# Patient Record
Sex: FEMALE | Race: WHITE | NOT HISPANIC OR LATINO | Employment: OTHER | URBAN - METROPOLITAN AREA
[De-identification: names, ages, dates, MRNs, and addresses within clinical notes are randomized per-mention and may not be internally consistent; named-entity substitution may affect disease eponyms.]

---

## 2022-04-15 ENCOUNTER — APPOINTMENT (OUTPATIENT)
Dept: ADMISSIONS | Facility: MEDICAL CENTER | Age: 67
End: 2022-04-15
Attending: INTERNAL MEDICINE
Payer: MEDICARE

## 2022-04-15 RX ORDER — LOSARTAN POTASSIUM AND HYDROCHLOROTHIAZIDE 12.5; 1 MG/1; MG/1
1 TABLET ORAL DAILY
COMMUNITY

## 2022-04-15 RX ORDER — FENOFIBRATE 145 MG/1
145 TABLET, COATED ORAL DAILY
COMMUNITY

## 2022-04-15 RX ORDER — BACLOFEN 10 MG/1
10 TABLET ORAL 4 TIMES DAILY
COMMUNITY

## 2022-04-15 RX ORDER — BUPROPION HYDROCHLORIDE 200 MG/1
200 TABLET, EXTENDED RELEASE ORAL DAILY
COMMUNITY

## 2022-04-15 RX ORDER — LEVOTHYROXINE SODIUM 175 UG/1
175 TABLET ORAL
COMMUNITY

## 2022-04-15 RX ORDER — ZONISAMIDE 100 MG/1
100 CAPSULE ORAL DAILY
COMMUNITY

## 2022-04-15 RX ORDER — GABAPENTIN 100 MG/1
100 CAPSULE ORAL 2 TIMES DAILY
COMMUNITY

## 2022-04-19 ENCOUNTER — HOSPITAL ENCOUNTER (OUTPATIENT)
Facility: MEDICAL CENTER | Age: 67
End: 2022-04-19
Attending: INTERNAL MEDICINE | Admitting: INTERNAL MEDICINE
Payer: MEDICARE

## 2022-04-19 VITALS
TEMPERATURE: 97.1 F | SYSTOLIC BLOOD PRESSURE: 128 MMHG | DIASTOLIC BLOOD PRESSURE: 53 MMHG | OXYGEN SATURATION: 91 % | HEIGHT: 65 IN | RESPIRATION RATE: 20 BRPM | BODY MASS INDEX: 40.04 KG/M2 | WEIGHT: 240.3 LBS | HEART RATE: 73 BPM

## 2022-04-19 DIAGNOSIS — D61.818 PANCYTOPENIA (HCC): ICD-10-CM

## 2022-04-19 LAB
ANISOCYTOSIS BLD QL SMEAR: ABNORMAL
BASOPHILS # BLD AUTO: 1.8 % (ref 0–1.8)
BASOPHILS # BLD: 0.06 K/UL (ref 0–0.12)
DACRYOCYTES BLD QL SMEAR: NORMAL
EOSINOPHIL # BLD AUTO: 0 K/UL (ref 0–0.51)
EOSINOPHIL NFR BLD: 0 % (ref 0–6.9)
ERYTHROCYTE [DISTWIDTH] IN BLOOD BY AUTOMATED COUNT: 73.7 FL (ref 35.9–50)
HCT VFR BLD AUTO: 27 % (ref 37–47)
HGB BLD-MCNC: 8.6 G/DL (ref 12–16)
HGB RETIC QN AUTO: 40.2 PG/CELL (ref 29–35)
IMM RETICS NFR: 37.4 % (ref 9.3–17.4)
LYMPHOCYTES # BLD AUTO: 1.37 K/UL (ref 1–4.8)
LYMPHOCYTES NFR BLD: 44.2 % (ref 22–41)
MACROCYTES BLD QL SMEAR: ABNORMAL
MANUAL DIFF BLD: NORMAL
MCH RBC QN AUTO: 36.4 PG (ref 27–33)
MCHC RBC AUTO-ENTMCNC: 31.9 G/DL (ref 33.6–35)
MCV RBC AUTO: 114.4 FL (ref 81.4–97.8)
MONOCYTES # BLD AUTO: 0.03 K/UL (ref 0–0.85)
MONOCYTES NFR BLD AUTO: 0.9 % (ref 0–13.4)
MORPHOLOGY BLD-IMP: NORMAL
MYELOCYTES NFR BLD MANUAL: 0.9 %
NEUTROPHILS # BLD AUTO: 1.62 K/UL (ref 2–7.15)
NEUTROPHILS NFR BLD: 52.2 % (ref 44–72)
NRBC # BLD AUTO: 0.12 K/UL
NRBC BLD-RTO: 3.8 /100 WBC
OVALOCYTES BLD QL SMEAR: NORMAL
PATHOLOGY CONSULT NOTE: NORMAL
PLATELET # BLD AUTO: 91 K/UL (ref 164–446)
PLATELET BLD QL SMEAR: NORMAL
PMV BLD AUTO: 12.2 FL (ref 9–12.9)
POIKILOCYTOSIS BLD QL SMEAR: NORMAL
POLYCHROMASIA BLD QL SMEAR: NORMAL
RBC # BLD AUTO: 2.36 M/UL (ref 4.2–5.4)
RBC BLD AUTO: PRESENT
RETICS # AUTO: 0.23 M/UL (ref 0.04–0.06)
RETICS/RBC NFR: 9.7 % (ref 0.8–2.1)
WBC # BLD AUTO: 3.1 K/UL (ref 4.8–10.8)

## 2022-04-19 PROCEDURE — 88313 SPECIAL STAINS GROUP 2: CPT

## 2022-04-19 PROCEDURE — 88185 FLOWCYTOMETRY/TC ADD-ON: CPT | Mod: 91

## 2022-04-19 PROCEDURE — 160025 RECOVERY II MINUTES (STATS): Performed by: HOSPITALIST

## 2022-04-19 PROCEDURE — 99152 MOD SED SAME PHYS/QHP 5/>YRS: CPT | Performed by: HOSPITALIST

## 2022-04-19 PROCEDURE — 85046 RETICYTE/HGB CONCENTRATE: CPT

## 2022-04-19 PROCEDURE — 160046 HCHG PACU - 1ST 60 MINS PHASE II: Performed by: HOSPITALIST

## 2022-04-19 PROCEDURE — 160048 HCHG OR STATISTICAL LEVEL 1-5: Performed by: HOSPITALIST

## 2022-04-19 PROCEDURE — 88311 DECALCIFY TISSUE: CPT

## 2022-04-19 PROCEDURE — 88360 TUMOR IMMUNOHISTOCHEM/MANUAL: CPT

## 2022-04-19 PROCEDURE — 88184 FLOWCYTOMETRY/ TC 1 MARKER: CPT

## 2022-04-19 PROCEDURE — 700111 HCHG RX REV CODE 636 W/ 250 OVERRIDE (IP): Performed by: HOSPITALIST

## 2022-04-19 PROCEDURE — 85007 BL SMEAR W/DIFF WBC COUNT: CPT

## 2022-04-19 PROCEDURE — 88305 TISSUE EXAM BY PATHOLOGIST: CPT | Mod: 59

## 2022-04-19 PROCEDURE — 38222 DX BONE MARROW BX & ASPIR: CPT | Performed by: HOSPITALIST

## 2022-04-19 PROCEDURE — 700105 HCHG RX REV CODE 258: Performed by: HOSPITALIST

## 2022-04-19 PROCEDURE — 160038 HCHG SURGERY MINUTES - EA ADDL 1 MIN LEVEL 2: Performed by: HOSPITALIST

## 2022-04-19 PROCEDURE — 88374 M/PHMTRC ALYS ISHQUANT/SEMIQ: CPT | Mod: 91

## 2022-04-19 PROCEDURE — 85025 COMPLETE CBC W/AUTO DIFF WBC: CPT

## 2022-04-19 PROCEDURE — 160027 HCHG SURGERY MINUTES - 1ST 30 MINS LEVEL 2: Performed by: HOSPITALIST

## 2022-04-19 PROCEDURE — 88264 CHROMOSOME ANALYSIS 20-25: CPT

## 2022-04-19 PROCEDURE — 88237 TISSUE CULTURE BONE MARROW: CPT

## 2022-04-19 RX ORDER — SODIUM CHLORIDE, SODIUM LACTATE, POTASSIUM CHLORIDE, CALCIUM CHLORIDE 600; 310; 30; 20 MG/100ML; MG/100ML; MG/100ML; MG/100ML
INJECTION, SOLUTION INTRAVENOUS CONTINUOUS
Status: DISCONTINUED | OUTPATIENT
Start: 2022-04-19 | End: 2022-04-19 | Stop reason: HOSPADM

## 2022-04-19 RX ORDER — ONDANSETRON 2 MG/ML
4 INJECTION INTRAMUSCULAR; INTRAVENOUS EVERY 4 HOURS PRN
Status: DISCONTINUED | OUTPATIENT
Start: 2022-04-19 | End: 2022-04-19 | Stop reason: HOSPADM

## 2022-04-19 RX ORDER — MIDAZOLAM HYDROCHLORIDE 1 MG/ML
INJECTION INTRAMUSCULAR; INTRAVENOUS
Status: DISCONTINUED
Start: 2022-04-19 | End: 2022-04-19 | Stop reason: HOSPADM

## 2022-04-19 RX ORDER — MIDAZOLAM HYDROCHLORIDE 1 MG/ML
.5-2 INJECTION INTRAMUSCULAR; INTRAVENOUS PRN
Status: DISCONTINUED | OUTPATIENT
Start: 2022-04-19 | End: 2022-04-19 | Stop reason: HOSPADM

## 2022-04-19 RX ORDER — SODIUM CHLORIDE 9 MG/ML
500 INJECTION, SOLUTION INTRAVENOUS
Status: DISCONTINUED | OUTPATIENT
Start: 2022-04-19 | End: 2022-04-19 | Stop reason: HOSPADM

## 2022-04-19 RX ADMIN — ONDANSETRON 4 MG: 2 INJECTION INTRAMUSCULAR; INTRAVENOUS at 10:21

## 2022-04-19 RX ADMIN — SODIUM CHLORIDE, POTASSIUM CHLORIDE, SODIUM LACTATE AND CALCIUM CHLORIDE: 600; 310; 30; 20 INJECTION, SOLUTION INTRAVENOUS at 09:43

## 2022-04-19 ASSESSMENT — PAIN DESCRIPTION - PAIN TYPE
TYPE: SURGICAL PAIN
TYPE: SURGICAL PAIN
TYPE: ACUTE PAIN
TYPE: SURGICAL PAIN
TYPE: ACUTE PAIN
TYPE: SURGICAL PAIN

## 2022-04-19 NOTE — OR NURSING
1056: Pt arrived from procedure room to bay #3, meets phase 2 critieria. ID verified. Report received from JOSH Marie. Connected to monitor. 2L O2 via NC.  Bandaid left ischium noted clean dry and intact. Pt awake and alert, denies any pain.    1100: weaned off O2 for room air trial    1107: friend Tracy given telephone updates, ETA 10 min. Discharge instructions given.    1117: PIV removed with tip intact. Pt discharged home in stable condition. Down to pick-up area via wheelchair accompanied by GUNJAN Gayle. All belongings taken.

## 2022-04-19 NOTE — PROCEDURES
Bone Marrow Biopsy/Aspiration    Date/Time: 4/19/2022 10:57 AM  Performed by: Luis A Snider M.D.  Authorized by: Luis A Snider M.D.     Consent:     Consent obtained:  Verbal    Consent given by:  Patient    Risks discussed:  Bleeding, infection, pain and repeat procedure    Alternatives discussed:  No treatment, delayed treatment and alternative treatment  Universal protocol:     Procedure explained and questions answered to patient or proxy's satisfaction: yes      Relevant documents present and verified: yes      Test results available and properly labeled: yes      Required blood products, implants, devices, and special equipment available: yes      Site/side marked: yes      Patient identity confirmed:  Verbally with patient, arm band and provided demographic data  Pre-procedure details:     Procedure type:  Aspiration and biopsy    Requesting physician:  Kamila    Indications:  Pancytopenia    Position:  Prone    Buttock laterality:  Left    Local anesthetic:  1% Lidocaine    Subcutaneous volume:  1 mL    Periosteum anesthetic volume:  4 mL    Preparation: Patient was prepped and draped in usual sterile fashion    Sedation:     Patient Sedated: Yes      Sedation type: moderate (conscious) sedation      Sedation:  Midazolam    Analgesia:  Fentanyl  Procedure details:     Aspirate obtained:  5 mL followed by 5 mL    Biopsy performed:  1 core    Number of attempts:  1  Post-procedure:     Puncture site:  Adhesive bandage applied    Patient tolerance of procedure:  Tolerated well, no immediate complications  Comments:      100mcg of fentanyl and 3mg of versed used for sedation and analgesia

## 2022-04-19 NOTE — DISCHARGE INSTRUCTIONS
BONE MARROW ASPIRATION & BIOPSY DISCHARGE INSTRUCTIONS     1. After the numbing medicine wears off, you may feel some discomfort.    2. Keep bandage clean and dry for 24 hours.  After this time, you can change the bandage.  You may now bathe or shower.    3. If bleeding occurs after your bone marrow aspiration or biopsy, apply pressure to the area and call your doctor immediately.    4. Call your doctor if pain persists for greater than 24 hours in the area where you had your aspiration or biopsy.    5. Call your doctor immediately if you notice redness or drainage in the area or if you have a fever.    6. Call your doctor if you have numbness or weakness in the area where the doctor took the bone marrow or down your leg.    7. Do not drive or drink alcohol for 24 hours if you have had sedation medication.  The medication will make you drowsy.    8. Resume your regular diet.    9. Follow up with Dr Treviño, call [phone] to schedule.

## 2022-05-17 DIAGNOSIS — C92.00 ACUTE MYELOID LEUKEMIA NOT HAVING ACHIEVED REMISSION (HCC): ICD-10-CM

## 2022-05-25 ENCOUNTER — HOSPITAL ENCOUNTER (INPATIENT)
Facility: MEDICAL CENTER | Age: 67
LOS: 4 days | DRG: 837 | End: 2022-05-29
Admitting: INTERNAL MEDICINE
Payer: MEDICARE

## 2022-05-25 DIAGNOSIS — C92.00 ACUTE MYELOID LEUKEMIA NOT HAVING ACHIEVED REMISSION (HCC): ICD-10-CM

## 2022-05-25 DIAGNOSIS — G44.011 INTRACTABLE EPISODIC CLUSTER HEADACHE: ICD-10-CM

## 2022-05-25 DIAGNOSIS — Z79.4 TYPE 2 DIABETES MELLITUS WITH HYPERGLYCEMIA, WITH LONG-TERM CURRENT USE OF INSULIN (HCC): ICD-10-CM

## 2022-05-25 DIAGNOSIS — E11.65 TYPE 2 DIABETES MELLITUS WITH HYPERGLYCEMIA, WITH LONG-TERM CURRENT USE OF INSULIN (HCC): ICD-10-CM

## 2022-05-25 LAB
ALBUMIN SERPL BCP-MCNC: 4.1 G/DL (ref 3.2–4.9)
ALBUMIN/GLOB SERPL: 1.8 G/DL
ALP SERPL-CCNC: 71 U/L (ref 30–99)
ALT SERPL-CCNC: 12 U/L (ref 2–50)
ANION GAP SERPL CALC-SCNC: 11 MMOL/L (ref 7–16)
ANISOCYTOSIS BLD QL SMEAR: ABNORMAL
AST SERPL-CCNC: 20 U/L (ref 12–45)
BASOPHILS # BLD AUTO: 2.8 % (ref 0–1.8)
BASOPHILS # BLD: 0.05 K/UL (ref 0–0.12)
BILIRUB SERPL-MCNC: 1.3 MG/DL (ref 0.1–1.5)
BUN SERPL-MCNC: 21 MG/DL (ref 8–22)
CALCIUM SERPL-MCNC: 9.5 MG/DL (ref 8.5–10.5)
CHLORIDE SERPL-SCNC: 104 MMOL/L (ref 96–112)
CO2 SERPL-SCNC: 23 MMOL/L (ref 20–33)
CREAT SERPL-MCNC: 1.32 MG/DL (ref 0.5–1.4)
EOSINOPHIL # BLD AUTO: 0.02 K/UL (ref 0–0.51)
EOSINOPHIL NFR BLD: 0.9 % (ref 0–6.9)
ERYTHROCYTE [DISTWIDTH] IN BLOOD BY AUTOMATED COUNT: 89.3 FL (ref 35.9–50)
GFR SERPLBLD CREATININE-BSD FMLA CKD-EPI: 44 ML/MIN/1.73 M 2
GLOBULIN SER CALC-MCNC: 2.3 G/DL (ref 1.9–3.5)
GLUCOSE BLD STRIP.AUTO-MCNC: 163 MG/DL (ref 65–99)
GLUCOSE BLD STRIP.AUTO-MCNC: 225 MG/DL (ref 65–99)
GLUCOSE SERPL-MCNC: 196 MG/DL (ref 65–99)
HCT VFR BLD AUTO: 26.7 % (ref 37–47)
HGB BLD-MCNC: 8.7 G/DL (ref 12–16)
HYPOCHROMIA BLD QL SMEAR: ABNORMAL
LG PLATELETS BLD QL SMEAR: NORMAL
LYMPHOCYTES # BLD AUTO: 1.32 K/UL (ref 1–4.8)
LYMPHOCYTES NFR BLD: 73.1 % (ref 22–41)
MACROCYTES BLD QL SMEAR: ABNORMAL
MANUAL DIFF BLD: NORMAL
MCH RBC QN AUTO: 35.2 PG (ref 27–33)
MCHC RBC AUTO-ENTMCNC: 32.6 G/DL (ref 33.6–35)
MCV RBC AUTO: 108.1 FL (ref 81.4–97.8)
MONOCYTES # BLD AUTO: 0.05 K/UL (ref 0–0.85)
MONOCYTES NFR BLD AUTO: 2.8 % (ref 0–13.4)
MORPHOLOGY BLD-IMP: NORMAL
NEUTROPHILS # BLD AUTO: 0.37 K/UL (ref 2–7.15)
NEUTROPHILS NFR BLD: 20.4 % (ref 44–72)
NRBC # BLD AUTO: 0.08 K/UL
NRBC BLD-RTO: 4.5 /100 WBC
OVALOCYTES BLD QL SMEAR: NORMAL
PLATELET # BLD AUTO: 101 K/UL (ref 164–446)
PLATELET BLD QL SMEAR: NORMAL
PMV BLD AUTO: 12.3 FL (ref 9–12.9)
POIKILOCYTOSIS BLD QL SMEAR: NORMAL
POLYCHROMASIA BLD QL SMEAR: NORMAL
POTASSIUM SERPL-SCNC: 4.1 MMOL/L (ref 3.6–5.5)
PROT SERPL-MCNC: 6.4 G/DL (ref 6–8.2)
RBC # BLD AUTO: 2.47 M/UL (ref 4.2–5.4)
RBC BLD AUTO: PRESENT
SODIUM SERPL-SCNC: 138 MMOL/L (ref 135–145)
URATE SERPL-MCNC: 4.6 MG/DL (ref 1.9–8.2)
WBC # BLD AUTO: 1.8 K/UL (ref 4.8–10.8)

## 2022-05-25 PROCEDURE — 770004 HCHG ROOM/CARE - ONCOLOGY PRIVATE *

## 2022-05-25 PROCEDURE — 36415 COLL VENOUS BLD VENIPUNCTURE: CPT

## 2022-05-25 PROCEDURE — 700105 HCHG RX REV CODE 258: Performed by: INTERNAL MEDICINE

## 2022-05-25 PROCEDURE — 85025 COMPLETE CBC W/AUTO DIFF WBC: CPT

## 2022-05-25 PROCEDURE — 84550 ASSAY OF BLOOD/URIC ACID: CPT

## 2022-05-25 PROCEDURE — 85007 BL SMEAR W/DIFF WBC COUNT: CPT

## 2022-05-25 PROCEDURE — 700111 HCHG RX REV CODE 636 W/ 250 OVERRIDE (IP): Mod: JG | Performed by: INTERNAL MEDICINE

## 2022-05-25 PROCEDURE — 80053 COMPREHEN METABOLIC PANEL: CPT

## 2022-05-25 PROCEDURE — 700102 HCHG RX REV CODE 250 W/ 637 OVERRIDE(OP): Performed by: INTERNAL MEDICINE

## 2022-05-25 PROCEDURE — A9270 NON-COVERED ITEM OR SERVICE: HCPCS | Performed by: INTERNAL MEDICINE

## 2022-05-25 PROCEDURE — 82962 GLUCOSE BLOOD TEST: CPT | Mod: 91

## 2022-05-25 RX ORDER — 0.9 % SODIUM CHLORIDE 0.9 %
3 VIAL (ML) INJECTION PRN
Status: CANCELLED | OUTPATIENT
Start: 2022-05-28

## 2022-05-25 RX ORDER — 0.9 % SODIUM CHLORIDE 0.9 %
VIAL (ML) INJECTION PRN
Status: CANCELLED | OUTPATIENT
Start: 2022-05-26

## 2022-05-25 RX ORDER — DEXTROSE MONOHYDRATE 25 G/50ML
25 INJECTION, SOLUTION INTRAVENOUS
Status: DISCONTINUED | OUTPATIENT
Start: 2022-05-25 | End: 2022-05-29 | Stop reason: HOSPADM

## 2022-05-25 RX ORDER — HEPARIN SODIUM (PORCINE) LOCK FLUSH IV SOLN 100 UNIT/ML 100 UNIT/ML
500 SOLUTION INTRAVENOUS PRN
Status: CANCELLED | OUTPATIENT
Start: 2022-05-27

## 2022-05-25 RX ORDER — PROCHLORPERAZINE MALEATE 10 MG
10 TABLET ORAL EVERY 6 HOURS PRN
Status: CANCELLED | OUTPATIENT
Start: 2022-05-25

## 2022-05-25 RX ORDER — LORAZEPAM 0.5 MG/1
0.5 TABLET ORAL EVERY 6 HOURS PRN
Status: CANCELLED | OUTPATIENT
Start: 2022-05-25

## 2022-05-25 RX ORDER — ONDANSETRON 8 MG/1
8 TABLET, ORALLY DISINTEGRATING ORAL EVERY 8 HOURS PRN
Status: CANCELLED | OUTPATIENT
Start: 2022-05-26

## 2022-05-25 RX ORDER — ONDANSETRON 8 MG/1
8 TABLET, ORALLY DISINTEGRATING ORAL EVERY 8 HOURS PRN
Status: CANCELLED | OUTPATIENT
Start: 2022-05-28

## 2022-05-25 RX ORDER — LORAZEPAM 2 MG/ML
0.5 INJECTION INTRAMUSCULAR EVERY 6 HOURS PRN
Status: CANCELLED | OUTPATIENT
Start: 2022-05-27

## 2022-05-25 RX ORDER — 0.9 % SODIUM CHLORIDE 0.9 %
10 VIAL (ML) INJECTION PRN
Status: CANCELLED | OUTPATIENT
Start: 2022-05-29

## 2022-05-25 RX ORDER — GABAPENTIN 100 MG/1
100 CAPSULE ORAL 2 TIMES DAILY PRN
Status: DISCONTINUED | OUTPATIENT
Start: 2022-05-25 | End: 2022-05-29 | Stop reason: HOSPADM

## 2022-05-25 RX ORDER — 0.9 % SODIUM CHLORIDE 0.9 %
10 VIAL (ML) INJECTION PRN
Status: CANCELLED | OUTPATIENT
Start: 2022-05-26

## 2022-05-25 RX ORDER — PROCHLORPERAZINE EDISYLATE 5 MG/ML
10 INJECTION INTRAMUSCULAR; INTRAVENOUS EVERY 6 HOURS PRN
Status: CANCELLED | OUTPATIENT
Start: 2022-05-29

## 2022-05-25 RX ORDER — LORAZEPAM 2 MG/ML
0.5 INJECTION INTRAMUSCULAR EVERY 6 HOURS PRN
Status: CANCELLED | OUTPATIENT
Start: 2022-05-25

## 2022-05-25 RX ORDER — SODIUM CHLORIDE 9 MG/ML
INJECTION, SOLUTION INTRAVENOUS CONTINUOUS
Status: DISCONTINUED | OUTPATIENT
Start: 2022-05-25 | End: 2022-05-27

## 2022-05-25 RX ORDER — 0.9 % SODIUM CHLORIDE 0.9 %
10 VIAL (ML) INJECTION PRN
Status: CANCELLED | OUTPATIENT
Start: 2022-05-25

## 2022-05-25 RX ORDER — PROCHLORPERAZINE EDISYLATE 5 MG/ML
10 INJECTION INTRAMUSCULAR; INTRAVENOUS EVERY 6 HOURS PRN
Status: CANCELLED | OUTPATIENT
Start: 2022-05-26

## 2022-05-25 RX ORDER — PROCHLORPERAZINE MALEATE 10 MG
10 TABLET ORAL EVERY 6 HOURS PRN
Status: CANCELLED | OUTPATIENT
Start: 2022-05-27

## 2022-05-25 RX ORDER — HYDROCHLOROTHIAZIDE 25 MG/1
12.5 TABLET ORAL
Status: DISCONTINUED | OUTPATIENT
Start: 2022-05-26 | End: 2022-05-29 | Stop reason: HOSPADM

## 2022-05-25 RX ORDER — 0.9 % SODIUM CHLORIDE 0.9 %
VIAL (ML) INJECTION PRN
Status: CANCELLED | OUTPATIENT
Start: 2022-05-28

## 2022-05-25 RX ORDER — ONDANSETRON 2 MG/ML
8 INJECTION INTRAMUSCULAR; INTRAVENOUS EVERY 8 HOURS PRN
Status: CANCELLED | OUTPATIENT
Start: 2022-05-25

## 2022-05-25 RX ORDER — 0.9 % SODIUM CHLORIDE 0.9 %
VIAL (ML) INJECTION PRN
Status: CANCELLED | OUTPATIENT
Start: 2022-05-29

## 2022-05-25 RX ORDER — PROCHLORPERAZINE MALEATE 10 MG
10 TABLET ORAL EVERY 6 HOURS PRN
Status: CANCELLED | OUTPATIENT
Start: 2022-05-28

## 2022-05-25 RX ORDER — LORAZEPAM 0.5 MG/1
0.5 TABLET ORAL EVERY 6 HOURS PRN
Status: CANCELLED | OUTPATIENT
Start: 2022-05-26

## 2022-05-25 RX ORDER — LORAZEPAM 0.5 MG/1
0.5 TABLET ORAL EVERY 6 HOURS PRN
Status: CANCELLED | OUTPATIENT
Start: 2022-05-27

## 2022-05-25 RX ORDER — HEPARIN SODIUM (PORCINE) LOCK FLUSH IV SOLN 100 UNIT/ML 100 UNIT/ML
500 SOLUTION INTRAVENOUS PRN
Status: CANCELLED | OUTPATIENT
Start: 2022-05-29

## 2022-05-25 RX ORDER — 0.9 % SODIUM CHLORIDE 0.9 %
10 VIAL (ML) INJECTION PRN
Status: CANCELLED | OUTPATIENT
Start: 2022-05-28

## 2022-05-25 RX ORDER — 0.9 % SODIUM CHLORIDE 0.9 %
3 VIAL (ML) INJECTION PRN
Status: CANCELLED | OUTPATIENT
Start: 2022-05-29

## 2022-05-25 RX ORDER — ONDANSETRON 2 MG/ML
8 INJECTION INTRAMUSCULAR; INTRAVENOUS EVERY 8 HOURS PRN
Status: CANCELLED | OUTPATIENT
Start: 2022-05-27

## 2022-05-25 RX ORDER — BACLOFEN 10 MG/1
10 TABLET ORAL 3 TIMES DAILY PRN
Status: DISCONTINUED | OUTPATIENT
Start: 2022-05-25 | End: 2022-05-29 | Stop reason: HOSPADM

## 2022-05-25 RX ORDER — ONDANSETRON 2 MG/ML
8 INJECTION INTRAMUSCULAR; INTRAVENOUS EVERY 8 HOURS PRN
Status: CANCELLED | OUTPATIENT
Start: 2022-05-29

## 2022-05-25 RX ORDER — 0.9 % SODIUM CHLORIDE 0.9 %
10 VIAL (ML) INJECTION PRN
Status: CANCELLED | OUTPATIENT
Start: 2022-05-27

## 2022-05-25 RX ORDER — LORAZEPAM 2 MG/ML
0.5 INJECTION INTRAMUSCULAR EVERY 6 HOURS PRN
Status: CANCELLED | OUTPATIENT
Start: 2022-05-28

## 2022-05-25 RX ORDER — ONDANSETRON 8 MG/1
8 TABLET, ORALLY DISINTEGRATING ORAL EVERY 8 HOURS PRN
Status: CANCELLED | OUTPATIENT
Start: 2022-05-25

## 2022-05-25 RX ORDER — 0.9 % SODIUM CHLORIDE 0.9 %
3 VIAL (ML) INJECTION PRN
Status: CANCELLED | OUTPATIENT
Start: 2022-05-26

## 2022-05-25 RX ORDER — LOSARTAN POTASSIUM 50 MG/1
100 TABLET ORAL
Status: DISCONTINUED | OUTPATIENT
Start: 2022-05-26 | End: 2022-05-29 | Stop reason: HOSPADM

## 2022-05-25 RX ORDER — HEPARIN SODIUM (PORCINE) LOCK FLUSH IV SOLN 100 UNIT/ML 100 UNIT/ML
500 SOLUTION INTRAVENOUS PRN
Status: CANCELLED | OUTPATIENT
Start: 2022-05-26

## 2022-05-25 RX ORDER — PROCHLORPERAZINE EDISYLATE 5 MG/ML
10 INJECTION INTRAMUSCULAR; INTRAVENOUS EVERY 6 HOURS PRN
Status: CANCELLED | OUTPATIENT
Start: 2022-05-25

## 2022-05-25 RX ORDER — ACYCLOVIR 200 MG/1
400 CAPSULE ORAL 2 TIMES DAILY
Status: DISCONTINUED | OUTPATIENT
Start: 2022-05-25 | End: 2022-05-29 | Stop reason: HOSPADM

## 2022-05-25 RX ORDER — FENOFIBRATE 145 MG/1
145 TABLET, COATED ORAL DAILY
Status: DISCONTINUED | OUTPATIENT
Start: 2022-05-26 | End: 2022-05-25

## 2022-05-25 RX ORDER — ONDANSETRON 2 MG/ML
8 INJECTION INTRAMUSCULAR; INTRAVENOUS EVERY 8 HOURS PRN
Status: CANCELLED | OUTPATIENT
Start: 2022-05-28

## 2022-05-25 RX ORDER — HEPARIN SODIUM (PORCINE) LOCK FLUSH IV SOLN 100 UNIT/ML 100 UNIT/ML
500 SOLUTION INTRAVENOUS PRN
Status: CANCELLED | OUTPATIENT
Start: 2022-05-25

## 2022-05-25 RX ORDER — 0.9 % SODIUM CHLORIDE 0.9 %
3 VIAL (ML) INJECTION PRN
Status: CANCELLED | OUTPATIENT
Start: 2022-05-25

## 2022-05-25 RX ORDER — LOSARTAN POTASSIUM AND HYDROCHLOROTHIAZIDE 12.5; 1 MG/1; MG/1
1 TABLET ORAL DAILY
Status: DISCONTINUED | OUTPATIENT
Start: 2022-05-25 | End: 2022-05-25

## 2022-05-25 RX ORDER — LORATADINE 10 MG/1
10 TABLET ORAL DAILY
Status: DISCONTINUED | OUTPATIENT
Start: 2022-05-25 | End: 2022-05-29 | Stop reason: HOSPADM

## 2022-05-25 RX ORDER — 0.9 % SODIUM CHLORIDE 0.9 %
VIAL (ML) INJECTION PRN
Status: CANCELLED | OUTPATIENT
Start: 2022-05-27

## 2022-05-25 RX ORDER — PROCHLORPERAZINE EDISYLATE 5 MG/ML
10 INJECTION INTRAMUSCULAR; INTRAVENOUS EVERY 6 HOURS PRN
Status: CANCELLED | OUTPATIENT
Start: 2022-05-28

## 2022-05-25 RX ORDER — ONDANSETRON 8 MG/1
8 TABLET, ORALLY DISINTEGRATING ORAL EVERY 8 HOURS PRN
Status: CANCELLED | OUTPATIENT
Start: 2022-05-27

## 2022-05-25 RX ORDER — HEPARIN SODIUM (PORCINE) LOCK FLUSH IV SOLN 100 UNIT/ML 100 UNIT/ML
500 SOLUTION INTRAVENOUS PRN
Status: CANCELLED | OUTPATIENT
Start: 2022-05-28

## 2022-05-25 RX ORDER — LORAZEPAM 0.5 MG/1
0.5 TABLET ORAL EVERY 6 HOURS PRN
Status: CANCELLED | OUTPATIENT
Start: 2022-05-29

## 2022-05-25 RX ORDER — LIDOCAINE AND PRILOCAINE 25; 25 MG/G; MG/G
CREAM TOPICAL ONCE
Status: CANCELLED | OUTPATIENT
Start: 2022-05-25 | End: 2022-05-25

## 2022-05-25 RX ORDER — ONDANSETRON 2 MG/ML
8 INJECTION INTRAMUSCULAR; INTRAVENOUS EVERY 8 HOURS PRN
Status: CANCELLED | OUTPATIENT
Start: 2022-05-26

## 2022-05-25 RX ORDER — ZONISAMIDE 50 MG/1
150 CAPSULE ORAL DAILY
Status: DISCONTINUED | OUTPATIENT
Start: 2022-05-26 | End: 2022-05-29 | Stop reason: HOSPADM

## 2022-05-25 RX ORDER — LORAZEPAM 2 MG/ML
0.5 INJECTION INTRAMUSCULAR EVERY 6 HOURS PRN
Status: CANCELLED | OUTPATIENT
Start: 2022-05-29

## 2022-05-25 RX ORDER — VORICONAZOLE 200 MG/1
200 TABLET, FILM COATED ORAL 2 TIMES DAILY
Status: DISCONTINUED | OUTPATIENT
Start: 2022-05-25 | End: 2022-05-29 | Stop reason: HOSPADM

## 2022-05-25 RX ORDER — LORAZEPAM 0.5 MG/1
0.5 TABLET ORAL EVERY 6 HOURS PRN
Status: CANCELLED | OUTPATIENT
Start: 2022-05-28

## 2022-05-25 RX ORDER — PROCHLORPERAZINE EDISYLATE 5 MG/ML
10 INJECTION INTRAMUSCULAR; INTRAVENOUS EVERY 6 HOURS PRN
Status: CANCELLED | OUTPATIENT
Start: 2022-05-27

## 2022-05-25 RX ORDER — PROCHLORPERAZINE MALEATE 10 MG
10 TABLET ORAL EVERY 6 HOURS PRN
Status: CANCELLED | OUTPATIENT
Start: 2022-05-26

## 2022-05-25 RX ORDER — 0.9 % SODIUM CHLORIDE 0.9 %
3 VIAL (ML) INJECTION PRN
Status: CANCELLED | OUTPATIENT
Start: 2022-05-27

## 2022-05-25 RX ORDER — BUPROPION HYDROCHLORIDE 100 MG/1
200 TABLET, EXTENDED RELEASE ORAL DAILY
Status: DISCONTINUED | OUTPATIENT
Start: 2022-05-26 | End: 2022-05-29 | Stop reason: HOSPADM

## 2022-05-25 RX ORDER — PROCHLORPERAZINE MALEATE 10 MG
10 TABLET ORAL EVERY 6 HOURS PRN
Status: CANCELLED | OUTPATIENT
Start: 2022-05-29

## 2022-05-25 RX ORDER — ONDANSETRON 8 MG/1
8 TABLET, ORALLY DISINTEGRATING ORAL EVERY 8 HOURS PRN
Status: CANCELLED | OUTPATIENT
Start: 2022-05-29

## 2022-05-25 RX ORDER — 0.9 % SODIUM CHLORIDE 0.9 %
VIAL (ML) INJECTION PRN
Status: CANCELLED | OUTPATIENT
Start: 2022-05-25

## 2022-05-25 RX ORDER — FENOFIBRATE 134 MG/1
134 CAPSULE ORAL DAILY
Status: DISCONTINUED | OUTPATIENT
Start: 2022-05-26 | End: 2022-05-29 | Stop reason: HOSPADM

## 2022-05-25 RX ORDER — LORAZEPAM 2 MG/ML
0.5 INJECTION INTRAMUSCULAR EVERY 6 HOURS PRN
Status: CANCELLED | OUTPATIENT
Start: 2022-05-26

## 2022-05-25 RX ADMIN — SODIUM CHLORIDE: 9 INJECTION, SOLUTION INTRAVENOUS at 13:25

## 2022-05-25 RX ADMIN — DECITABINE 45.2 MG: 50 INJECTION, POWDER, LYOPHILIZED, FOR SOLUTION INTRAVENOUS at 15:50

## 2022-05-25 RX ADMIN — VENETOCLAX 10 MG: 10 TABLET, FILM COATED ORAL at 19:03

## 2022-05-25 RX ADMIN — ACYCLOVIR 400 MG: 200 CAPSULE ORAL at 18:24

## 2022-05-25 RX ADMIN — INSULIN GLARGINE-YFGN 140 UNITS: 100 INJECTION, SOLUTION SUBCUTANEOUS at 18:22

## 2022-05-25 RX ADMIN — VORICONAZOLE 200 MG: 200 TABLET ORAL at 18:24

## 2022-05-25 ASSESSMENT — COGNITIVE AND FUNCTIONAL STATUS - GENERAL
WALKING IN HOSPITAL ROOM: A LITTLE
CLIMB 3 TO 5 STEPS WITH RAILING: A LITTLE
MOBILITY SCORE: 24
SUGGESTED CMS G CODE MODIFIER DAILY ACTIVITY: CH
MOBILITY SCORE: 22
SUGGESTED CMS G CODE MODIFIER MOBILITY: CJ
DAILY ACTIVITIY SCORE: 24
SUGGESTED CMS G CODE MODIFIER MOBILITY: CH

## 2022-05-25 ASSESSMENT — PATIENT HEALTH QUESTIONNAIRE - PHQ9
2. FEELING DOWN, DEPRESSED, IRRITABLE, OR HOPELESS: NOT AT ALL
1. LITTLE INTEREST OR PLEASURE IN DOING THINGS: NOT AT ALL
SUM OF ALL RESPONSES TO PHQ9 QUESTIONS 1 AND 2: 0

## 2022-05-25 ASSESSMENT — PAIN DESCRIPTION - PAIN TYPE: TYPE: ACUTE PAIN

## 2022-05-25 ASSESSMENT — LIFESTYLE VARIABLES
CONSUMPTION TOTAL: POSITIVE
ON A TYPICAL DAY WHEN YOU DRINK ALCOHOL HOW MANY DRINKS DO YOU HAVE: 0
TOTAL SCORE: 0
HAVE PEOPLE ANNOYED YOU BY CRITICIZING YOUR DRINKING: NO
TOTAL SCORE: 0
DOES PATIENT WANT TO STOP DRINKING: CANNOT ASSESS
AVERAGE NUMBER OF DAYS PER WEEK YOU HAVE A DRINK CONTAINING ALCOHOL: 0
HOW MANY TIMES IN THE PAST YEAR HAVE YOU HAD 5 OR MORE DRINKS IN A DAY: 1
EVER FELT BAD OR GUILTY ABOUT YOUR DRINKING: NO
ALCOHOL_USE: YES
TOTAL SCORE: 0
HAVE YOU EVER FELT YOU SHOULD CUT DOWN ON YOUR DRINKING: NO
EVER HAD A DRINK FIRST THING IN THE MORNING TO STEADY YOUR NERVES TO GET RID OF A HANGOVER: NO

## 2022-05-25 NOTE — PROGRESS NOTES
"Pharmacy Chemotherapy calculation:    DX: AML    Cycle 1    Days 1-5  Previous treatment = none    Regimen: decitabine + venetoclax  venetoclax initial dose ramping -max dose 100mg due to DDI with voriconazole  venetoclax 10mg day 1  venetoclax 20mg day 2  venetoclax 50mg day3  venetoclax 100mg daily on days 4-28  Decitabine 20mg/m2 IV on days 1-5  Induction x 1 28 day cycle  NCCN Guidelines for AML V.1.2022  Noah VICENTE at al - Lancet Oncol 2018 Feb;19(2):216-228.  Noah VICENTE at al  - Blood 2019 Gabe 3;133(1):7-17     /58   Pulse 77   Temp 36.9 °C (98.5 °F) (Oral)   Resp 20   Ht 1.651 m (5' 5\")   Wt 111 kg (245 lb 6 oz)   LMP 04/15/2005   SpO2 98%   BMI 40.83 kg/m²   Body surface area is 2.26 meters squared.     All lab results reviewed. No HOLD parameters. Transfusion support as needed.   MD aware of all current lab results. Orders received to proceed with treatment.   No allopurinol prophylaxis per d/w Dr. Mccoy 5/25/22 as white count already low.      Decitabine 20mg/m2 x 2.26m2 = 45.2mg   <10% difference, ok to treat with final dose = 45.2mg IV on day 1-5      MORELIA Herrera, Pharm.D.      "

## 2022-05-25 NOTE — PROGRESS NOTES
"Pharmacy Chemotherapy Calculations    Patient Name: Zena Myers     Diagnosis: AML     Regimen: Induction       Modified Venetoclax dosing schedule 2/2 concomitant Voriconazole:   · 10 mg on Day 1   · 20 mg on Day 2   · 50 mg on Day 3   · 100 mg on Days 4 - 28     Dosing References: [id number]       Prophylaxis:  · Acyclovir 400 mg PO BID   · Voriconazole 200 mg PO BID   · Allopurinol not indicated at this time - Low risk for elevated uric acid per Oncologist.     Allergies: Clindamycin and Pcn [penicillins]       /58   Pulse 77   Temp 36.9 °C (98.5 °F) (Oral)   Resp 20   Ht 1.651 m (5' 5\")   Wt 111 kg (245 lb 6 oz)   LMP 04/15/2005   SpO2 98%   BMI 40.83 kg/m²  Body surface area is 2.26 meters squared.     Weight Type Height Weight BSA Additional Details   Most recent 165.1 cm 111 kg 2.26 m² Weight as of 5/25/2022  8:23 AM; height as of 5/25/2022  8:23 AM     Parameters met according to treatment plan/roadmap 5/25/22.      Drug Order   (Drug name, dose, route, IV Fluid & volume, frequency, number of doses) Cycle 1, Days 1 - 5      Previous treatment: N/A      Medication = Decitabine (DACOGEN)   Base Dose = 20 mg/m2   Calc Dose: Base Dose x 2.26 m2 = 45.2 mg  Final Dose = 45.2 mg  Route = IV  Fluid & Volume =  mL  Admin Duration = Over 60 minutes           <10% difference, OK to treat with final dose     By my signature below, I confirm this process was performed independently with the BSA and all final chemotherapy dosing calculations congruent. I have reviewed the above chemotherapy order and that my calculation of the final dose and BSA (when applicable) corroborate those calculations of the  pharmacist. Discrepancies of 5% or greater in the written dose have been addressed and documented within the [facility] Progress notes.    Signature: Megan Berger, Jena, BCPS          "

## 2022-05-25 NOTE — PROGRESS NOTES
Chemotherapy Verification - SECONDARY RN       Height = 165.1cm  Weight = 111kg  BSA = 2.26m2       Medication: Decitabine (Dacogen)  Dose: 20mg/m2  Calculated Dose: 45.2mg (ordered dose: 45.2mg)                             (In mg/m2, AUC, mg/kg)     I confirm that this process was performed independently.

## 2022-05-25 NOTE — PROGRESS NOTES
Medication Reconciliation Complete per pt and pharmacy    ABX: Macrobid 100mg BID x 5 days. Finished 3/27/22  Allergies reviewed.     Home pharmacy: [facility] [phone]

## 2022-05-25 NOTE — PROGRESS NOTES
"Chemotherapy Verification - PRIMARY RN      Height = 5'5\"  Weight = 111kg  BSA = 2.26       Medication: Dacogen  Dose: 20mg/m2  Calculated Dose: 45.2 (ordered dose 45.2mg)                            (In mg/m2, AUC, mg/kg)             I confirm this process was performed independently with the BSA and all final chemotherapy dosing calculations congruent.  Any discrepancies of 10% or greater have been addressed with the chemotherapy pharmacist. The resolution of the discrepancy has been documented in the EPIC progress notes.       "

## 2022-05-25 NOTE — H&P
"DATE OF ADMISSION:  05/25/2022     CHIEF COMPLAINT:  \"I am here to start chemotherapy.     HISTORY OF PRESENT ILLNESS:  The patient is a very nice 66-year-old woman with   history of hypertension, hyperlipidemia, diabetes type 2, hypothyroidism,   migraines, GERD, depression, who was referred to me initially because of   pancytopenia, with neutropenia being her most prominent feature.  As part of   her initial workup, she had a bone marrow biopsy on 04/19/2022.  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  Her bone marrow was hypercellular, there were 20% myeloblasts with   multilineage dysplasia.  FISH testing was negative for FLT3, IDH1, IDH2, TP53.    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     Overall, her blood counts were only mildly abnormal.  Lab work from 05/10/2022   showed a white count of 3.2 with a hemoglobin of 7.8, hematocrit 24.7% and   platelets of 110.  Her ANC was 1.8.  She had 58% neutrophils, 36% lymphocytes,   4% monocytes, 0% eosinophils, and 0% basophils with some early progenitor   cells and immature granulocytes seen on the peripheral blood.     We talked about different treatment options.  She had a lot of medical   comorbidities and was felt not to be a good candidate for aggressive induction   therapy.  Given her AML arising out of MDS, it was felt she would be a better   candidate for low intensity therapy including HMA chemotherapy, as well as   venetoclax.  We talked about the treatment goals.  She understood that this   was not a curative therapy, but can certainly give her more time.  It is not   entirely clear whether she might be a candidate for a consolidative low   intensity bone marrow transplant in the future.  This was something she was   going to think about it and consider, and we were going to evaluate how she   tolerated the chemo.  We did talk about hospice as an alternative option.     After thinking about it and " considering her options, she wanted to do the [facility]   chemotherapy with venetoclax.  My office has been working on getting her home   dose supply of venetoclax.  She lives out in the [place] area, and it was   difficult for her to travel daily for the Dacogen therapy.  As such, the plan   was to admit her to the hospital for cycle #1.  She received Dacogen 20 per   meter squared given through the IV on days 1 through 5 on a 28 day cycle.  She   will start the venetoclax on ramping up dose, per the [place] protocol.  We   have also been working to get her on voriconazole prophylaxis.  She will start   this while she is an inpatient.  Because of this, her maximum dose of   venetoclax will be capped at 100 mg.     She is being admitted to the hospital now for cycle #1 of her treatment.     PAST MEDICAL HISTORY:  1.  Hypertension.  2.  Hyperlipidemia.  3.  Diabetes type 2.  4.  Hypothyroidism.  5.  Migraines.  6.  Allergic rhinitis.  7.  GERD.  8.  Depression.  9.  Impaired mobility.  10.  AML.     PAST SURGICAL HISTORY:  Thyroidectomy in 2005 related to Hashimoto's.     ALLERGIES:  CLINDAMYCIN AND PENICILLIN.     HOME MEDICATIONS:  1.  Baclofen 10 mg 4 times a day p.r.n. back pain.  2.  Bupropion  mg q.12h.  3.  Bydureon.  4.  Fenofibrate 150 mg.  5.  Gabapentin 100 mg p.o. b.i.d.  6.  Lantus insulin b.i.d.  7.  Losartan 100 mg/hydrochlorothiazide 12.5 mg 1 tablet p.o. every day.  8.  Zonisamide 100 mg.     SOCIAL HISTORY:  She is a lifelong nonsmoker.  She is single.  She lives alone   in [place], [place].  She has no children.  She drinks alcohol socially only   about 1-2 drinks per year.  She does not use any illegal drugs.  She is   retired .  She was born in [place].     FAMILY HISTORY:  Her mother had a history of colon cancer.  There is no other   significant family history.     REVIEW OF SYSTEMS:  Review of systems to be performed by the admitting doctor   on admission.     PHYSICAL EXAMINATION:  VITAL  SIGNS:  To be performed on admission.  GENERAL:  No acute distress, pleasant woman, appears stated age.  Somewhat   frail.  HEENT:  NCAT.  Sclerae are anicteric.  Conjunctivae clear.  Oropharynx clear   without erythema, exudate or discharge.  NECK:  Supple, nontender, no JVP, no carotid bruits, no thyromegaly.  CHEST:  Clear to auscultation and percussion bilaterally.  No wheeze, rales or   rhonchi.  CARDIOVASCULAR:  Regular rate and rhythm.  No murmurs, gallops or rubs.    Normal S1, S2.  ABDOMEN:  Soft, nontender, nondistended, no hepatosplenomegaly.  No guarding,   rebound, or masses.  LYMPH NODES:  No cervical, supraclavicular, infraclavicular, axillary or   inguinal lymphadenopathy.  SKIN:  No rashes, bruising, petechiae, ulcerations, or nonhealing wounds.  EXTREMITIES:  No cyanosis, clubbing or edema.  Full range of motion, no joint   swelling.  NEUROLOGIC:  Her cranial nerves II-XII are intact.  She has intact sensation   to light touch throughout.  She moves all 4 extremities appropriately.  PSYCHIATRIC:  She has normal mood and affect.  She has normal concentration.    She is alert and oriented x3.     ASSESSMENT AND PLAN:  At this point, she is going to be admitted for her cycle   #1 of chemotherapy for her AML with myelodysplastic changes.  This will be   Dacogen given through the IV daily on days 1 through 5.  She will also be   started on venetoclax with the ramping up per protocol, which is in the [place]   orders.  She will be on voriconazole at 200 mg p.o. b.i.d.  Because of this,   her venetoclax dose will be capped at 100 mg per day.     She will be in the hospital at least for the 5 days of chemo.  If there is no   evidence of TLS and she is doing well, she can be discharged for outpatient   followup.  She will need to be seen in my office once a week after discharge.    She will probably need blood counts at least once to twice a week in the [facility].    We have been working on a home supply  of her venetoclax and voriconazole,   which she should have prior to discharge.     She will need ongoing management for her diabetes.  She can continue the home   diabetic medication.  If there are any problems with her diabetes control,   medicine team can be consulted to help with this care.     She will likely need ongoing transfusion support.  She has required a   transfusion of blood in the past.  As her counts go down on further therapy,   she may need more frequent transfusions.  Transfusion should be done with a   CMV negative and irradiated products.     She will be admitted to the hospital on 05/25/2022.  My partner, Dr. Tigist Mccoy will see her on that day and care for her while she is in the   hospital.        ______________________________  MD GERTRUDE Ritter/NEHA    DD:  05/25/2022 08:18  DT:  05/25/2022 09:47    Job#:  [id number]   Never

## 2022-05-26 PROBLEM — E03.9 HYPOTHYROID: Status: ACTIVE | Noted: 2022-05-26

## 2022-05-26 PROBLEM — Z79.4 TYPE 2 DIABETES MELLITUS WITH HYPERGLYCEMIA, WITH LONG-TERM CURRENT USE OF INSULIN (HCC): Status: ACTIVE | Noted: 2022-05-26

## 2022-05-26 PROBLEM — E11.65 TYPE 2 DIABETES MELLITUS WITH HYPERGLYCEMIA, WITH LONG-TERM CURRENT USE OF INSULIN (HCC): Status: ACTIVE | Noted: 2022-05-26

## 2022-05-26 PROBLEM — E78.5 DYSLIPIDEMIA: Status: ACTIVE | Noted: 2022-05-26

## 2022-05-26 PROBLEM — J30.9 ALLERGIC RHINITIS: Status: ACTIVE | Noted: 2022-05-26

## 2022-05-26 PROBLEM — I10 BENIGN ESSENTIAL HTN: Status: ACTIVE | Noted: 2022-05-26

## 2022-05-26 LAB
ALBUMIN SERPL BCP-MCNC: 3.8 G/DL (ref 3.2–4.9)
ALBUMIN/GLOB SERPL: 1.8 G/DL
ALP SERPL-CCNC: 64 U/L (ref 30–99)
ALT SERPL-CCNC: 11 U/L (ref 2–50)
ANION GAP SERPL CALC-SCNC: 11 MMOL/L (ref 7–16)
ANISOCYTOSIS BLD QL SMEAR: ABNORMAL
AST SERPL-CCNC: 15 U/L (ref 12–45)
BASOPHILS # BLD AUTO: 1.8 % (ref 0–1.8)
BASOPHILS # BLD: 0.04 K/UL (ref 0–0.12)
BILIRUB SERPL-MCNC: 1 MG/DL (ref 0.1–1.5)
BUN SERPL-MCNC: 19 MG/DL (ref 8–22)
CALCIUM SERPL-MCNC: 9.1 MG/DL (ref 8.5–10.5)
CHLORIDE SERPL-SCNC: 106 MMOL/L (ref 96–112)
CO2 SERPL-SCNC: 22 MMOL/L (ref 20–33)
CREAT SERPL-MCNC: 1.31 MG/DL (ref 0.5–1.4)
EOSINOPHIL # BLD AUTO: 0 K/UL (ref 0–0.51)
EOSINOPHIL NFR BLD: 0 % (ref 0–6.9)
ERYTHROCYTE [DISTWIDTH] IN BLOOD BY AUTOMATED COUNT: 94.2 FL (ref 35.9–50)
GFR SERPLBLD CREATININE-BSD FMLA CKD-EPI: 44 ML/MIN/1.73 M 2
GLOBULIN SER CALC-MCNC: 2.1 G/DL (ref 1.9–3.5)
GLUCOSE BLD STRIP.AUTO-MCNC: 129 MG/DL (ref 65–99)
GLUCOSE BLD STRIP.AUTO-MCNC: 143 MG/DL (ref 65–99)
GLUCOSE BLD STRIP.AUTO-MCNC: 91 MG/DL (ref 65–99)
GLUCOSE SERPL-MCNC: 174 MG/DL (ref 65–99)
HCT VFR BLD AUTO: 25 % (ref 37–47)
HGB BLD-MCNC: 7.8 G/DL (ref 12–16)
LDH SERPL L TO P-CCNC: 243 U/L (ref 107–266)
LG PLATELETS BLD QL SMEAR: NORMAL
LYMPHOCYTES # BLD AUTO: 1.35 K/UL (ref 1–4.8)
LYMPHOCYTES NFR BLD: 67.3 % (ref 22–41)
MACROCYTES BLD QL SMEAR: ABNORMAL
MANUAL DIFF BLD: NORMAL
MCH RBC QN AUTO: 34.4 PG (ref 27–33)
MCHC RBC AUTO-ENTMCNC: 31.2 G/DL (ref 33.6–35)
MCV RBC AUTO: 110.1 FL (ref 81.4–97.8)
MONOCYTES # BLD AUTO: 0.04 K/UL (ref 0–0.85)
MONOCYTES NFR BLD AUTO: 1.8 % (ref 0–13.4)
MORPHOLOGY BLD-IMP: NORMAL
NEUTROPHILS # BLD AUTO: 0.58 K/UL (ref 2–7.15)
NEUTROPHILS NFR BLD: 29.1 % (ref 44–72)
NRBC # BLD AUTO: 0.09 K/UL
NRBC BLD-RTO: 4.6 /100 WBC
OVALOCYTES BLD QL SMEAR: NORMAL
PLATELET # BLD AUTO: 94 K/UL (ref 164–446)
PLATELET BLD QL SMEAR: NORMAL
PMV BLD AUTO: 12 FL (ref 9–12.9)
POIKILOCYTOSIS BLD QL SMEAR: NORMAL
POLYCHROMASIA BLD QL SMEAR: NORMAL
POTASSIUM SERPL-SCNC: 4.1 MMOL/L (ref 3.6–5.5)
PROT SERPL-MCNC: 5.9 G/DL (ref 6–8.2)
RBC # BLD AUTO: 2.27 M/UL (ref 4.2–5.4)
RBC BLD AUTO: PRESENT
SODIUM SERPL-SCNC: 139 MMOL/L (ref 135–145)
URATE SERPL-MCNC: 4.2 MG/DL (ref 1.9–8.2)
WBC # BLD AUTO: 2 K/UL (ref 4.8–10.8)

## 2022-05-26 PROCEDURE — 770004 HCHG ROOM/CARE - ONCOLOGY PRIVATE *

## 2022-05-26 PROCEDURE — 36415 COLL VENOUS BLD VENIPUNCTURE: CPT

## 2022-05-26 PROCEDURE — 700102 HCHG RX REV CODE 250 W/ 637 OVERRIDE(OP): Performed by: HOSPITALIST

## 2022-05-26 PROCEDURE — A9270 NON-COVERED ITEM OR SERVICE: HCPCS | Performed by: INTERNAL MEDICINE

## 2022-05-26 PROCEDURE — 700105 HCHG RX REV CODE 258: Performed by: INTERNAL MEDICINE

## 2022-05-26 PROCEDURE — 85025 COMPLETE CBC W/AUTO DIFF WBC: CPT

## 2022-05-26 PROCEDURE — 80053 COMPREHEN METABOLIC PANEL: CPT

## 2022-05-26 PROCEDURE — 84550 ASSAY OF BLOOD/URIC ACID: CPT

## 2022-05-26 PROCEDURE — 700111 HCHG RX REV CODE 636 W/ 250 OVERRIDE (IP): Performed by: HOSPITALIST

## 2022-05-26 PROCEDURE — 83615 LACTATE (LD) (LDH) ENZYME: CPT

## 2022-05-26 PROCEDURE — 82962 GLUCOSE BLOOD TEST: CPT

## 2022-05-26 PROCEDURE — 700102 HCHG RX REV CODE 250 W/ 637 OVERRIDE(OP): Performed by: INTERNAL MEDICINE

## 2022-05-26 PROCEDURE — 99222 1ST HOSP IP/OBS MODERATE 55: CPT | Performed by: HOSPITALIST

## 2022-05-26 PROCEDURE — 85007 BL SMEAR W/DIFF WBC COUNT: CPT

## 2022-05-26 PROCEDURE — A9270 NON-COVERED ITEM OR SERVICE: HCPCS | Performed by: HOSPITALIST

## 2022-05-26 PROCEDURE — 700111 HCHG RX REV CODE 636 W/ 250 OVERRIDE (IP): Mod: JG | Performed by: INTERNAL MEDICINE

## 2022-05-26 RX ORDER — ACETAMINOPHEN 325 MG/1
650 TABLET ORAL EVERY 4 HOURS PRN
Status: DISCONTINUED | OUTPATIENT
Start: 2022-05-26 | End: 2022-05-29 | Stop reason: HOSPADM

## 2022-05-26 RX ORDER — INSULIN LISPRO 100 [IU]/ML
2-9 INJECTION, SOLUTION INTRAVENOUS; SUBCUTANEOUS
Status: DISCONTINUED | OUTPATIENT
Start: 2022-05-26 | End: 2022-05-29 | Stop reason: HOSPADM

## 2022-05-26 RX ORDER — OXYCODONE HYDROCHLORIDE 5 MG/1
5 TABLET ORAL EVERY 6 HOURS PRN
Status: DISCONTINUED | OUTPATIENT
Start: 2022-05-26 | End: 2022-05-29 | Stop reason: HOSPADM

## 2022-05-26 RX ORDER — IPRATROPIUM BROMIDE 21 UG/1
2 SPRAY, METERED NASAL EVERY 4 HOURS PRN
Status: DISCONTINUED | OUTPATIENT
Start: 2022-05-26 | End: 2022-05-29 | Stop reason: HOSPADM

## 2022-05-26 RX ORDER — ONDANSETRON 2 MG/ML
4 INJECTION INTRAMUSCULAR; INTRAVENOUS EVERY 4 HOURS PRN
Status: DISCONTINUED | OUTPATIENT
Start: 2022-05-26 | End: 2022-05-29 | Stop reason: HOSPADM

## 2022-05-26 RX ORDER — DIPHENHYDRAMINE HCL 25 MG
25 TABLET ORAL NIGHTLY PRN
Status: DISCONTINUED | OUTPATIENT
Start: 2022-05-26 | End: 2022-05-29 | Stop reason: HOSPADM

## 2022-05-26 RX ORDER — ECHINACEA PURPUREA EXTRACT 125 MG
2 TABLET ORAL
Status: DISCONTINUED | OUTPATIENT
Start: 2022-05-26 | End: 2022-05-29 | Stop reason: HOSPADM

## 2022-05-26 RX ORDER — ONDANSETRON 4 MG/1
4 TABLET, ORALLY DISINTEGRATING ORAL EVERY 4 HOURS PRN
Status: DISCONTINUED | OUTPATIENT
Start: 2022-05-26 | End: 2022-05-29 | Stop reason: HOSPADM

## 2022-05-26 RX ADMIN — LORATADINE 10 MG: 10 TABLET ORAL at 09:37

## 2022-05-26 RX ADMIN — SODIUM CHLORIDE: 9 INJECTION, SOLUTION INTRAVENOUS at 10:35

## 2022-05-26 RX ADMIN — GABAPENTIN 100 MG: 100 CAPSULE ORAL at 03:29

## 2022-05-26 RX ADMIN — SALINE NASAL SPRAY 2 SPRAY: 1.5 SOLUTION NASAL at 10:36

## 2022-05-26 RX ADMIN — ACYCLOVIR 400 MG: 200 CAPSULE ORAL at 09:19

## 2022-05-26 RX ADMIN — LOSARTAN POTASSIUM 100 MG: 50 TABLET, FILM COATED ORAL at 09:14

## 2022-05-26 RX ADMIN — DIPHENHYDRAMINE HYDROCHLORIDE 25 MG: 25 TABLET ORAL at 20:45

## 2022-05-26 RX ADMIN — HYDROCHLOROTHIAZIDE 12.5 MG: 25 TABLET ORAL at 09:13

## 2022-05-26 RX ADMIN — FENOFIBRATE 134 MG: 134 CAPSULE ORAL at 09:16

## 2022-05-26 RX ADMIN — BUPROPION HYDROCHLORIDE 200 MG: 100 TABLET, FILM COATED, EXTENDED RELEASE ORAL at 09:15

## 2022-05-26 RX ADMIN — ACYCLOVIR 400 MG: 200 CAPSULE ORAL at 17:20

## 2022-05-26 RX ADMIN — VORICONAZOLE 200 MG: 200 TABLET ORAL at 18:33

## 2022-05-26 RX ADMIN — VENETOCLAX 20 MG: 10 TABLET, FILM COATED ORAL at 17:20

## 2022-05-26 RX ADMIN — DECITABINE 45.2 MG: 50 INJECTION, POWDER, LYOPHILIZED, FOR SOLUTION INTRAVENOUS at 15:55

## 2022-05-26 RX ADMIN — ACETAMINOPHEN 650 MG: 325 TABLET ORAL at 15:30

## 2022-05-26 RX ADMIN — ONDANSETRON 4 MG: 4 TABLET, ORALLY DISINTEGRATING ORAL at 20:45

## 2022-05-26 RX ADMIN — ZONISAMIDE 150 MG: 50 CAPSULE ORAL at 09:14

## 2022-05-26 RX ADMIN — VORICONAZOLE 200 MG: 200 TABLET ORAL at 06:10

## 2022-05-26 RX ADMIN — SALINE NASAL SPRAY 2 SPRAY: 1.5 SOLUTION NASAL at 17:19

## 2022-05-26 RX ADMIN — LEVOTHYROXINE SODIUM 175 MCG: 0.12 TABLET ORAL at 06:10

## 2022-05-26 ASSESSMENT — ENCOUNTER SYMPTOMS
SPUTUM PRODUCTION: 0
SHORTNESS OF BREATH: 0
COUGH: 1
MUSCULOSKELETAL NEGATIVE: 1
VOMITING: 0
PSYCHIATRIC NEGATIVE: 1
EYES NEGATIVE: 1
CARDIOVASCULAR NEGATIVE: 1
NAUSEA: 1
NEUROLOGICAL NEGATIVE: 1

## 2022-05-26 ASSESSMENT — PAIN DESCRIPTION - PAIN TYPE
TYPE: ACUTE PAIN
TYPE: ACUTE PAIN

## 2022-05-26 NOTE — ASSESSMENT & PLAN NOTE
She reports that her most recent hemoglobin A1c was 7.1  5/27: Blood sugar this am 61, repeat 107  -After extensive discussion with patient, left lantus dosing as is  -Added evening snack  5/28: Blood sugar this am 52   -Decreased lantus to 100 mg BID     -Reviewed with patient that due to to her AML and likely poor appetite with induction chemotherapy we might need to readjust her insulin on a daily basis depending on her p.o. intake and BG readings

## 2022-05-26 NOTE — PROGRESS NOTES
"Pharmacy Chemotherapy Calculations    Patient Name: Zena Myers     Diagnosis: AML     Regimen: Induction       Modified Venetoclax dosing schedule 2/2 concomitant Voriconazole:   · 10 mg on Day 1   · 20 mg on Day 2   · 50 mg on Day 3   · 100 mg on Days 4 - 28     Dosing References: [id number]       Prophylaxis:  · Acyclovir 400 mg PO BID   · Voriconazole 200 mg PO BID   · Allopurinol not indicated at this time - Low risk for elevated uric acid per Oncologist.     Allergies: Clindamycin and Pcn [penicillins]       /59   Pulse 74   Temp 36.8 °C (98.3 °F) (Temporal)   Resp 16   Ht 1.651 m (5' 5\")   Wt 111 kg (245 lb 6 oz)   LMP 04/15/2005   SpO2 93%   BMI 40.83 kg/m²  Body surface area is 2.26 meters squared.     Weight Type Height Weight BSA Additional Details   Most recent 165.1 cm 111 kg 2.26 m² Weight as of 5/25/2022  8:23 AM; height as of 5/25/2022  8:23 AM     Parameters met according to treatment plan/roadmap 5/25/22.       Drug Order   (Drug name, dose, route, IV Fluid & volume, frequency, number of doses) Cycle 1, Days 1 - 5      Previous treatment: N/A      Medication = Decitabine (DACOGEN)   Base Dose = 20 mg/m2   Calc Dose: Base Dose x 2.26 m2 = 45.2 mg  Final Dose = 45.2 mg  Route = IV  Fluid & Volume =  mL  Admin Duration = Over 60 minutes           <10% difference, OK to treat with final dose     By my signature below, I confirm this process was performed independently with the BSA and all final chemotherapy dosing calculations congruent. I have reviewed the above chemotherapy order and that my calculation of the final dose and BSA (when applicable) corroborate those calculations of the  pharmacist. Discrepancies of 5% or greater in the written dose have been addressed and documented within the [facility] Progress notes.    Signature: Imelda Medeiros, PharmD, BCPS  PGY2 Infectious Diseases Pharmacy Resident            "

## 2022-05-26 NOTE — PROGRESS NOTES
4 Eyes Skin Assessment Completed by JOSH Salazar and JOSH nevarez.    Head WDL  Ears WDL  Nose WDL  Mouth WDL  Neck WDL  Breast/Chest WDL  Shoulder Blades WDL  Spine WDL  (R) Arm/Elbow/Hand WDL  (L) Arm/Elbow/Hand WDL  Abdomen WDL  Groin WDL  Scrotum/Coccyx/Buttocks WDL  (R) Leg WDL  (L) Leg WDL  (R) Heel/Foot/Toe WDL  (L) Heel/Foot/Toe WDL          Devices In Places: none      Interventions In Place Pillows    Possible Skin Injury No    Pictures Uploaded Into Epic N/A  Wound Consult Placed N/A  RN Wound Prevention Protocol Ordered No

## 2022-05-26 NOTE — PROGRESS NOTES
Oncology/Hematology Progress Note               Author: Tigist Mccoy M.D. Date & Time created: 5/26/2022  10:26 AM     CC: AML, admitted for induction chemotherapy with decitabine and venetoclax    Interval History:  Pt reports some mild cough this morning, nonproductive, she feels claritin helps. She also reported stomach upset last night after chemotherapy. This has now improved. She is eating meals as normal. No fevers or SOB. She did not sleep well last night.    Review of Systems:  Review of Systems   Constitutional: Positive for malaise/fatigue.   HENT: Negative.    Eyes: Negative.    Respiratory: Positive for cough. Negative for sputum production and shortness of breath.    Cardiovascular: Negative.    Gastrointestinal: Positive for nausea. Negative for vomiting.   Genitourinary: Negative.    Musculoskeletal: Negative.    Skin: Negative.    Neurological: Negative.    Endo/Heme/Allergies: Negative.    Psychiatric/Behavioral: Negative.        Physical Exam:  Physical Exam  Constitutional:       General: She is not in acute distress.     Appearance: She is normal weight. She is not toxic-appearing.   HENT:      Head: Normocephalic and atraumatic.      Right Ear: External ear normal.      Left Ear: External ear normal.      Nose: Nose normal.      Mouth/Throat:      Mouth: Mucous membranes are moist.      Pharynx: Oropharynx is clear. No oropharyngeal exudate.   Eyes:      General: No scleral icterus.     Conjunctiva/sclera: Conjunctivae normal.   Cardiovascular:      Rate and Rhythm: Normal rate and regular rhythm.      Heart sounds: No murmur heard.    No gallop.   Pulmonary:      Effort: Pulmonary effort is normal. No respiratory distress.      Breath sounds: Normal breath sounds. No wheezing or rales.   Abdominal:      General: Abdomen is flat. Bowel sounds are normal. There is no distension.      Palpations: Abdomen is soft.      Tenderness: There is no abdominal tenderness.   Musculoskeletal:          General: No swelling or tenderness.      Cervical back: Neck supple. No tenderness.   Skin:     General: Skin is warm and dry.   Neurological:      General: No focal deficit present.      Mental Status: She is alert and oriented to person, place, and time.   Psychiatric:         Mood and Affect: Mood normal.         Behavior: Behavior normal.         Thought Content: Thought content normal.         Judgment: Judgment normal.         Labs:          Recent Labs     22  0940   SODIUM 138   POTASSIUM 4.1   CHLORIDE 104   CO2 23   BUN 21   CREATININE 1.32   CALCIUM 9.5     Recent Labs     22  0940   ALTSGPT 12   ASTSGOT 20   ALKPHOSPHAT 71   TBILIRUBIN 1.3   GLUCOSE 196*     Recent Labs     22  0940   RBC 2.47*   HEMOGLOBIN 8.7*   HEMATOCRIT 26.7*   PLATELETCT 101*     Recent Labs     22  0940   WBC 1.8*   NEUTSPOLYS 20.40*   LYMPHOCYTES 73.10*   MONOCYTES 2.80   EOSINOPHILS 0.90   BASOPHILS 2.80*   ASTSGOT 20   ALTSGPT 12   ALKPHOSPHAT 71   TBILIRUBIN 1.3     Recent Labs     22  0940   SODIUM 138   POTASSIUM 4.1   CHLORIDE 104   CO2 23   GLUCOSE 196*   BUN 21   CREATININE 1.32   CALCIUM 9.5     Hemodynamics:  Temp (24hrs), Av.8 °C (98.3 °F), Min:36.5 °C (97.7 °F), Max:37 °C (98.6 °F)  Temperature: 36.8 °C (98.3 °F)  Pulse  Av.1  Min: 67  Max: 78   Blood Pressure : 116/59     Respiratory:    Respiration: 16, Pulse Oximetry: 93 %     Work Of Breathing / Effort:  (dyspnea with exerction)  RUL Breath Sounds: Diminished, RML Breath Sounds: Diminished, RLL Breath Sounds: Diminished, TICO Breath Sounds: Diminished, LLL Breath Sounds: Diminished  Fluids:    Intake/Output Summary (Last 24 hours) at 2022 1026  Last data filed at 2022 1650  Gross per 24 hour   Intake 100 ml   Output --   Net 100 ml        GI/Nutrition:  Orders Placed This Encounter   Procedures   • Diet Order Diet: Consistent CHO (Diabetic)     Standing Status:   Standing     Number of Occurrences:   1     Order  Specific Question:   Diet:     Answer:   Consistent CHO (Diabetic) [4]     Medical Decision Making, by Problem:  Active Hospital Problems    Diagnosis    • *Type 2 diabetes mellitus with hyperglycemia, with long-term current use of insulin (HCC) [E11.65, Z79.4]    • Hypothyroid [E03.9]    • Benign essential HTN [I10]    • Dyslipidemia [E78.5]    • Allergic rhinitis [J30.9]    • AML (acute myelogenous leukemia) (HCC) [C92.00]        Assessment and Plan:    # AML with myelodysplastic changes - patient presented with pancytopenia.  Bone marrow biopsy from 4/19/2022 shows a hypercellular marrow with 20% myeloblasts.  Cytogenetics were normal, FISH negative, negative for FLT3, IDH1/2, NPMN1, and TP53 mutations. Positive for single CEBPA mutation.  - Admitted yesterday by Dr. Treviño to start decitabine and venetoclax.  Low intensity regimen was selected secondary to her age and other underlying medical comorbidity  - Decitabine 20 mg/m2 IV daily x 5 days  - Venetoclax - ramp up to 100 mg daily due to vori interaction. Pt will have medication delivered to her home today.  - Monitor labs - no evidence of TLS, needs updated labs today     # Type 2 DM  - pt self reports taking 140 U lantus BID  - hospital medicine has been consulted    # Hypertension  - resume home meds    # Hyothyroidism  - resume home levothyroxine    # PPX  - Voriconazole  - Acyclovir        Quality-Core Measures   Reviewed items::  Labs reviewed and Medications reviewed  Tnog catheter::  No Tong  DVT prophylaxis pharmacological::  Not indicated at this time, ambulatory and Contraindicated - High bleeding risk

## 2022-05-26 NOTE — ASSESSMENT & PLAN NOTE
We will resume her home dose of nightly Benadryl as needed which she has tolerated well  We will add saline nasal spray and nasal Atrovent  Avoid nasal steroids given her thrombocytopenia and increased risk of epistaxis

## 2022-05-26 NOTE — DIETARY
NUTRITION SERVICES: BMI - Pt with BMI >40 (=Body mass index is 40.83 kg/m².), Class III obesity. Weight loss counseling not appropriate in acute care setting. RECOMMEND - If appropriate at DC please refer to outpatient nutrition services for weight management.     RD available PRN.

## 2022-05-26 NOTE — PROGRESS NOTES
"Chemotherapy Verification - PRIMARY RN      Height = 5'5\"   Weight = 111   BSA = 2.26       Medication: Dacogen  Dose: 20mg/m2  Calculated Dose: 45.2mg  (ordered dose 45.2)                            (In mg/m2, AUC, mg/kg)         I confirm this process was performed independently with the BSA and all final chemotherapy dosing calculations congruent.  Any discrepancies of 10% or greater have been addressed with the chemotherapy pharmacist. The resolution of the discrepancy has been documented in the EPIC progress notes.       "

## 2022-05-26 NOTE — PROGRESS NOTES
Patient requesting Benadryl to help with her allergies/congestion. RN reached out to MD who denied this request since the patient receives Claritin.

## 2022-05-26 NOTE — CARE PLAN
The patient is Watcher - Medium risk of patient condition declining or worsening    Shift Goals  Clinical Goals: resting  Patient Goals: getting through chemo    Progress made toward(s) clinical / shift goal      Problem: Psychosocial  Goal: Patient's level of anxiety will decrease  Outcome: Progressing     Problem: Communication  Goal: The ability to communicate needs accurately and effectively will improve  Outcome: Progressing     Problem: Mobility  Goal: Patient's capacity to carry out activities will improve  Outcome: Progressing     Problem: Self Care  Goal: Patient will have the ability to perform ADLs independently or with assistance (bathe, groom, dress, toilet and feed)  Outcome: Progressing

## 2022-05-26 NOTE — PROGRESS NOTES
"Patient is refusing all medications at this time stating \"my stomach is already upset, I need food with my pills.\" RN offered a snack to take with meds and patient started to get agitated. Medications pushed to 0800 to allow patient to eat.     Also morning BS 91, notified MD to see if 140units of long acting was ok to hold or if they wanted this to be given. Awaiting a response.   "

## 2022-05-26 NOTE — ASSESSMENT & PLAN NOTE
Stable on losartan and hydrochlorothiazide  Monitor electrolytes with thiazide therapy and replete accordingly

## 2022-05-26 NOTE — CONSULTS
Hospital Medicine Consultation    Date of Service  5/26/2022    Referring Physician  Dr Tigist Mccoy    Consulting Physician  Jonas Wynne M.D.    Reason for Consultation  Assist with management of diabetes    History of Presenting Illness  66 y.o. female who presented 5/25/2022 with newly diagnosed AML admitted for induction chemotherapy.  Patient has a history of type 2 diabetes with insulin resistance has been maintained on high-dose Lantus of 180 units twice daily until she was recently diagnosed with AML and has been having fluctuating glucose levels.  Her PCP decreased her Lantus dose 120 twice daily that she was having high readings so she self increased her dose to 140 units twice daily she has also been on exenatide weekly injections.  She was previously on metformin when she was first diagnosed but did not tolerated.  She was noted to have a glucose of 98 this morning and her Lantus was held.  She reports that she has been having poor appetite with nausea and ate half of her breakfast this morning  Patient has a history of hypertension which has been well controlled.  She reports having seasonal allergic rhinitis for which she has been maintained on Benadryl at bedtime which she has been tolerating well with good control of her symptoms.  She is complaining of nasal congestion and postnasal drip and was unable to sleep last night as she did not get her Benadryl.  No fever or chills.    Review of Systems  Review of Systems   All other systems reviewed and are negative.      Past Medical History   has a past medical history of Acute nasopharyngitis (01/15/2022), Anemia, Anesthesia, Arthritis, Breath shortness, Bronchitis (2010), Cough, Diabetes (HCC), Disorder of thyroid, Heart burn, Hiatus hernia syndrome, High cholesterol, Hypertension, Indigestion, Psychiatric problem, Renal disorder, and Urinary incontinence.    Surgical History   has a past surgical history that includes thyroidectomy total  (2005); colonoscopy; pr dx bone marrow aspirations (Left, 4/19/2022); and pr dx bone marrow biopsies (Left, 4/19/2022).    Family History  Reviewed and not pertinent to the presenting problem    Social History   reports that she has never smoked. She has never used smokeless tobacco. She reports previous alcohol use. She reports that she does not use drugs.    Medications  Prior to Admission Medications   Prescriptions Last Dose Informant Patient Reported? Taking?   Exenatide (BYDUREON SC) 5/24/2022 at PM  Yes No   Sig: Inject  under the skin every 7 days. Takes on Sundays   Insulin Glargine (LANTUS SC) 5/25/2022 at 0600  Yes No   Sig: Inject 140 Units under the skin 2 times a day.   baclofen (LIORESAL) 10 MG Tab 5/25/2022 at 0600  Yes No   Sig: Take 10 mg by mouth 3 times a day. As needed   buPROPion (WELLBUTRIN SR) 200 MG SR tablet 5/25/2022 at 0600  Yes No   Sig: Take 200 mg by mouth every day.   fenofibrate (TRICOR) 145 MG Tab 5/25/2022 at 0600  Yes No   Sig: Take 145 mg by mouth every day.   gabapentin (NEURONTIN) 100 MG Cap 5/25/2022 at 0600  Yes No   Sig: Take 100 mg by mouth 2 times a day. prn   levothyroxine (SYNTHROID) 175 MCG Tab 5/25/2022 at 0600  Yes No   Sig: Take 175 mcg by mouth every morning on an empty stomach.   losartan-hydrochlorothiazide (HYZAAR) 100-12.5 MG per tablet 5/25/2022 at 0600  Yes No   Sig: Take 1 Tablet by mouth every day.   zonisamide (ZONEGRAN) 100 MG Cap 5/25/2022 at 0600  Yes No   Sig: Take 150 mg by mouth every day.      Facility-Administered Medications: None       Allergies  Allergies   Allergen Reactions   • Clindamycin Rash and Swelling     Family history   • Pcn [Penicillins]      Family history       Physical Exam  Temp:  [36.5 °C (97.7 °F)-37 °C (98.6 °F)] 36.8 °C (98.3 °F)  Pulse:  [67-78] 74  Resp:  [16-20] 16  BP: (104-142)/(54-68) 116/59  SpO2:  [91 %-98 %] 93 %    Physical Exam  Vitals and nursing note reviewed.   Constitutional:       General: She is not in acute  distress.     Appearance: She is obese.   HENT:      Head: Normocephalic and atraumatic.      Nose: Nose normal. No rhinorrhea.      Mouth/Throat:      Pharynx: No oropharyngeal exudate or posterior oropharyngeal erythema.   Eyes:      General: No scleral icterus.        Right eye: No discharge.         Left eye: No discharge.   Cardiovascular:      Rate and Rhythm: Normal rate and regular rhythm.      Heart sounds: Normal heart sounds. No murmur heard.    No friction rub. No gallop.   Pulmonary:      Effort: Pulmonary effort is normal. No respiratory distress.      Breath sounds: Normal breath sounds. No stridor. No wheezing, rhonchi or rales.   Chest:      Chest wall: No tenderness.   Abdominal:      General: Bowel sounds are normal. There is no distension.      Palpations: Abdomen is soft. There is no mass.      Tenderness: There is no abdominal tenderness. There is no rebound.   Musculoskeletal:         General: No swelling or tenderness.      Cervical back: Neck supple. No rigidity.   Skin:     General: Skin is warm and dry.      Coloration: Skin is not cyanotic or jaundiced.      Nails: There is no clubbing.   Neurological:      General: No focal deficit present.      Mental Status: She is alert and oriented to person, place, and time.      Cranial Nerves: No cranial nerve deficit.      Motor: No weakness.   Psychiatric:         Mood and Affect: Mood normal.         Behavior: Behavior normal.         Fluids      Laboratory  Recent Labs     05/25/22  0940   WBC 1.8*   RBC 2.47*   HEMOGLOBIN 8.7*   HEMATOCRIT 26.7*   .1*   MCH 35.2*   MCHC 32.6*   RDW 89.3*   PLATELETCT 101*   MPV 12.3     Recent Labs     05/25/22  0940   SODIUM 138   POTASSIUM 4.1   CHLORIDE 104   CO2 23   GLUCOSE 196*   BUN 21   CREATININE 1.32   CALCIUM 9.5                     Imaging  No orders to display       Assessment/Plan  * Type 2 diabetes mellitus with hyperglycemia, with long-term current use of insulin ([facility])  Assessment &  Plan  She reports that her most recent hemoglobin A1c was 7.1    Given her low CBG reading this morning we will decrease her Lantus to 120 units twice a day and add insulin lispro sliding scale with meals  Monitor CBGs and adjust insulin accordingly  Reviewed with patient that due to to her AML and likely poor appetite with induction chemotherapy we might need to readjust her insulin on a daily basis depending on her p.o. intake and CBG readings    Allergic rhinitis  Assessment & Plan  We will resume her home dose of nightly Benadryl as needed which she has tolerated well  We will add saline nasal spray and nasal Atrovent  Avoid nasal steroids given her thrombocytopenia and increased risk of epistaxis    Dyslipidemia  Assessment & Plan  Continue fenofibrate    Benign essential HTN  Assessment & Plan  Stable on losartan hydrochlorothiazide  Monitor electrolytes with thiazide therapy and replete accordingly    Hypothyroid  Assessment & Plan  Continue levothyroxine    AML (acute myelogenous leukemia) (HCC)- (present on admission)  Assessment & Plan  Chemotherapy per oncology  Monitor cbc and transfuse as needed  Neutropenic precautions and

## 2022-05-27 LAB
ALBUMIN SERPL BCP-MCNC: 3.5 G/DL (ref 3.2–4.9)
ALBUMIN/GLOB SERPL: 1.5 G/DL
ALP SERPL-CCNC: 61 U/L (ref 30–99)
ALT SERPL-CCNC: 11 U/L (ref 2–50)
ANION GAP SERPL CALC-SCNC: 10 MMOL/L (ref 7–16)
ANISOCYTOSIS BLD QL SMEAR: ABNORMAL
AST SERPL-CCNC: 19 U/L (ref 12–45)
BASOPHILS # BLD AUTO: 0.9 % (ref 0–1.8)
BASOPHILS # BLD: 0.02 K/UL (ref 0–0.12)
BILIRUB SERPL-MCNC: 0.8 MG/DL (ref 0.1–1.5)
BUN SERPL-MCNC: 20 MG/DL (ref 8–22)
CALCIUM SERPL-MCNC: 9.2 MG/DL (ref 8.5–10.5)
CHLORIDE SERPL-SCNC: 108 MMOL/L (ref 96–112)
CO2 SERPL-SCNC: 20 MMOL/L (ref 20–33)
CREAT SERPL-MCNC: 1.33 MG/DL (ref 0.5–1.4)
EOSINOPHIL # BLD AUTO: 0 K/UL (ref 0–0.51)
EOSINOPHIL NFR BLD: 0 % (ref 0–6.9)
ERYTHROCYTE [DISTWIDTH] IN BLOOD BY AUTOMATED COUNT: 95.4 FL (ref 35.9–50)
GFR SERPLBLD CREATININE-BSD FMLA CKD-EPI: 44 ML/MIN/1.73 M 2
GLOBULIN SER CALC-MCNC: 2.4 G/DL (ref 1.9–3.5)
GLUCOSE BLD STRIP.AUTO-MCNC: 104 MG/DL (ref 65–99)
GLUCOSE BLD STRIP.AUTO-MCNC: 107 MG/DL (ref 65–99)
GLUCOSE BLD STRIP.AUTO-MCNC: 165 MG/DL (ref 65–99)
GLUCOSE BLD STRIP.AUTO-MCNC: 61 MG/DL (ref 65–99)
GLUCOSE BLD STRIP.AUTO-MCNC: 70 MG/DL (ref 65–99)
GLUCOSE SERPL-MCNC: 82 MG/DL (ref 65–99)
HCT VFR BLD AUTO: 24.1 % (ref 37–47)
HGB BLD-MCNC: 7.6 G/DL (ref 12–16)
LDH SERPL L TO P-CCNC: 250 U/L (ref 107–266)
LYMPHOCYTES # BLD AUTO: 1.47 K/UL (ref 1–4.8)
LYMPHOCYTES NFR BLD: 66.7 % (ref 22–41)
MACROCYTES BLD QL SMEAR: ABNORMAL
MANUAL DIFF BLD: NORMAL
MCH RBC QN AUTO: 34.9 PG (ref 27–33)
MCHC RBC AUTO-ENTMCNC: 31.5 G/DL (ref 33.6–35)
MCV RBC AUTO: 110.6 FL (ref 81.4–97.8)
MONOCYTES # BLD AUTO: 0.02 K/UL (ref 0–0.85)
MONOCYTES NFR BLD AUTO: 0.9 % (ref 0–13.4)
MORPHOLOGY BLD-IMP: NORMAL
MYELOCYTES NFR BLD MANUAL: 2.7 %
NEUTROPHILS # BLD AUTO: 0.63 K/UL (ref 2–7.15)
NEUTROPHILS NFR BLD: 28.8 % (ref 44–72)
NRBC # BLD AUTO: 0.08 K/UL
NRBC BLD-RTO: 3.6 /100 WBC
OVALOCYTES BLD QL SMEAR: NORMAL
PLATELET # BLD AUTO: 75 K/UL (ref 164–446)
PLATELET BLD QL SMEAR: NORMAL
PMV BLD AUTO: 12.2 FL (ref 9–12.9)
POIKILOCYTOSIS BLD QL SMEAR: NORMAL
POLYCHROMASIA BLD QL SMEAR: NORMAL
POTASSIUM SERPL-SCNC: 3.7 MMOL/L (ref 3.6–5.5)
PROT SERPL-MCNC: 5.9 G/DL (ref 6–8.2)
RBC # BLD AUTO: 2.18 M/UL (ref 4.2–5.4)
RBC BLD AUTO: PRESENT
SODIUM SERPL-SCNC: 138 MMOL/L (ref 135–145)
URATE SERPL-MCNC: 3.8 MG/DL (ref 1.9–8.2)
WBC # BLD AUTO: 2.2 K/UL (ref 4.8–10.8)

## 2022-05-27 PROCEDURE — 700102 HCHG RX REV CODE 250 W/ 637 OVERRIDE(OP): Performed by: HOSPITALIST

## 2022-05-27 PROCEDURE — 99233 SBSQ HOSP IP/OBS HIGH 50: CPT | Performed by: NURSE PRACTITIONER

## 2022-05-27 PROCEDURE — 84550 ASSAY OF BLOOD/URIC ACID: CPT

## 2022-05-27 PROCEDURE — 700102 HCHG RX REV CODE 250 W/ 637 OVERRIDE(OP): Performed by: INTERNAL MEDICINE

## 2022-05-27 PROCEDURE — 85007 BL SMEAR W/DIFF WBC COUNT: CPT

## 2022-05-27 PROCEDURE — 85025 COMPLETE CBC W/AUTO DIFF WBC: CPT

## 2022-05-27 PROCEDURE — 700111 HCHG RX REV CODE 636 W/ 250 OVERRIDE (IP): Performed by: HOSPITALIST

## 2022-05-27 PROCEDURE — A9270 NON-COVERED ITEM OR SERVICE: HCPCS | Performed by: HOSPITALIST

## 2022-05-27 PROCEDURE — 770004 HCHG ROOM/CARE - ONCOLOGY PRIVATE *

## 2022-05-27 PROCEDURE — 700111 HCHG RX REV CODE 636 W/ 250 OVERRIDE (IP): Mod: JG | Performed by: INTERNAL MEDICINE

## 2022-05-27 PROCEDURE — 700101 HCHG RX REV CODE 250: Performed by: HOSPITALIST

## 2022-05-27 PROCEDURE — A9270 NON-COVERED ITEM OR SERVICE: HCPCS | Performed by: INTERNAL MEDICINE

## 2022-05-27 PROCEDURE — 36415 COLL VENOUS BLD VENIPUNCTURE: CPT

## 2022-05-27 PROCEDURE — 80053 COMPREHEN METABOLIC PANEL: CPT

## 2022-05-27 PROCEDURE — 83615 LACTATE (LD) (LDH) ENZYME: CPT

## 2022-05-27 PROCEDURE — 82962 GLUCOSE BLOOD TEST: CPT

## 2022-05-27 PROCEDURE — 700105 HCHG RX REV CODE 258: Performed by: INTERNAL MEDICINE

## 2022-05-27 RX ORDER — DIPHENHYDRAMINE HCL 25 MG
25 TABLET ORAL
Status: DISCONTINUED | OUTPATIENT
Start: 2022-05-27 | End: 2022-05-29 | Stop reason: HOSPADM

## 2022-05-27 RX ORDER — ACETAMINOPHEN 325 MG/1
650 TABLET ORAL
Status: DISCONTINUED | OUTPATIENT
Start: 2022-05-27 | End: 2022-05-29 | Stop reason: HOSPADM

## 2022-05-27 RX ADMIN — ZONISAMIDE 150 MG: 50 CAPSULE ORAL at 06:16

## 2022-05-27 RX ADMIN — GABAPENTIN 100 MG: 100 CAPSULE ORAL at 12:38

## 2022-05-27 RX ADMIN — ACETAMINOPHEN 650 MG: 325 TABLET ORAL at 23:21

## 2022-05-27 RX ADMIN — LOSARTAN POTASSIUM 100 MG: 50 TABLET, FILM COATED ORAL at 06:14

## 2022-05-27 RX ADMIN — IPRATROPIUM BROMIDE 2 SPRAY: 21 SPRAY NASAL at 18:23

## 2022-05-27 RX ADMIN — IPRATROPIUM BROMIDE 2 SPRAY: 21 SPRAY NASAL at 22:34

## 2022-05-27 RX ADMIN — SALINE NASAL SPRAY 2 SPRAY: 1.5 SOLUTION NASAL at 12:40

## 2022-05-27 RX ADMIN — IPRATROPIUM BROMIDE 2 SPRAY: 21 SPRAY NASAL at 13:57

## 2022-05-27 RX ADMIN — VORICONAZOLE 200 MG: 200 TABLET ORAL at 06:12

## 2022-05-27 RX ADMIN — ACYCLOVIR 400 MG: 200 CAPSULE ORAL at 17:05

## 2022-05-27 RX ADMIN — LEVOTHYROXINE SODIUM 175 MCG: 0.12 TABLET ORAL at 06:15

## 2022-05-27 RX ADMIN — DECITABINE 45.2 MG: 50 INJECTION, POWDER, LYOPHILIZED, FOR SOLUTION INTRAVENOUS at 14:58

## 2022-05-27 RX ADMIN — ACYCLOVIR 400 MG: 200 CAPSULE ORAL at 06:12

## 2022-05-27 RX ADMIN — VORICONAZOLE 200 MG: 200 TABLET ORAL at 18:23

## 2022-05-27 RX ADMIN — SALINE NASAL SPRAY 2 SPRAY: 1.5 SOLUTION NASAL at 16:41

## 2022-05-27 RX ADMIN — ONDANSETRON 4 MG: 4 TABLET, ORALLY DISINTEGRATING ORAL at 03:30

## 2022-05-27 RX ADMIN — ONDANSETRON 4 MG: 4 TABLET, ORALLY DISINTEGRATING ORAL at 14:04

## 2022-05-27 RX ADMIN — SALINE NASAL SPRAY 2 SPRAY: 1.5 SOLUTION NASAL at 06:21

## 2022-05-27 RX ADMIN — HYDROCHLOROTHIAZIDE 12.5 MG: 25 TABLET ORAL at 06:12

## 2022-05-27 RX ADMIN — VENETOCLAX 50 MG: 50 TABLET, FILM COATED ORAL at 17:07

## 2022-05-27 RX ADMIN — ACETAMINOPHEN 650 MG: 325 TABLET ORAL at 13:56

## 2022-05-27 RX ADMIN — ACETAMINOPHEN 650 MG: 325 TABLET ORAL at 18:23

## 2022-05-27 RX ADMIN — BUPROPION HYDROCHLORIDE 200 MG: 100 TABLET, FILM COATED, EXTENDED RELEASE ORAL at 06:17

## 2022-05-27 RX ADMIN — ACETAMINOPHEN 650 MG: 325 TABLET ORAL at 06:20

## 2022-05-27 RX ADMIN — LORATADINE 10 MG: 10 TABLET ORAL at 06:16

## 2022-05-27 RX ADMIN — INSULIN LISPRO 2 UNITS: 100 INJECTION, SOLUTION INTRAVENOUS; SUBCUTANEOUS at 17:07

## 2022-05-27 RX ADMIN — FENOFIBRATE 134 MG: 134 CAPSULE ORAL at 06:17

## 2022-05-27 RX ADMIN — DIPHENHYDRAMINE HYDROCHLORIDE 25 MG: 25 TABLET ORAL at 21:23

## 2022-05-27 ASSESSMENT — ENCOUNTER SYMPTOMS
MUSCULOSKELETAL NEGATIVE: 1
CARDIOVASCULAR NEGATIVE: 1
DEPRESSION: 0
BACK PAIN: 1
WEAKNESS: 1
NERVOUS/ANXIOUS: 1
ABDOMINAL PAIN: 0
PALPITATIONS: 0
HEADACHES: 0
PSYCHIATRIC NEGATIVE: 1
COUGH: 1
SHORTNESS OF BREATH: 0
EYES NEGATIVE: 1
NAUSEA: 1
FEVER: 0
VOMITING: 0
BLURRED VISION: 1
NEUROLOGICAL NEGATIVE: 1
CHILLS: 0
DIZZINESS: 0
SPUTUM PRODUCTION: 0
HEARTBURN: 0
WEIGHT LOSS: 0
DIARRHEA: 0
COUGH: 0

## 2022-05-27 ASSESSMENT — PAIN DESCRIPTION - PAIN TYPE: TYPE: ACUTE PAIN

## 2022-05-27 NOTE — CARE PLAN
The patient is Watcher - Medium risk of patient condition declining or worsening    Shift Goals  Clinical Goals: Rest  Patient Goals: Rest    Progress made toward(s) clinical / shift goals:    Problem: Urinary Elimination  Goal: Establish and maintain regular urinary output  Outcome: Progressing     Problem: Mobility  Goal: Patient's capacity to carry out activities will improve  Outcome: Progressing     Problem: Self Care  Goal: Patient will have the ability to perform ADLs independently or with assistance (bathe, groom, dress, toilet and feed)  Outcome: Progressing     Problem: Pain - Standard  Goal: Alleviation of pain or a reduction in pain to the patient’s comfort goal  Outcome: Progressing  Note: Per day RN pt complained of a headache- tylenol given. Pt states pain 0/10 for this RN.        Patient is not progressing towards the following goals:      Problem: Hemodynamics  Goal: Patient's hemodynamics, fluid balance and neurologic status will be stable or improve  Outcome: Not Progressing  Note: Pt has hemoglobin of 7.8, will see what results are tonight to decide if transfusion is necessary.

## 2022-05-27 NOTE — PROGRESS NOTES
Hospital Medicine Daily Progress Note    Date of Service  5/27/2022    Chief Complaint  Zena Myers is a 66 y.o. female admitted 5/25/2022 for induction chemotherapy    Hospital Course  66 y.o. female who presented 5/25/2022 with newly diagnosed AML admitted for induction chemotherapy.  The hospitalist service was consulted to assist in management of her blood glucose levels. Patient has a history of type 2 diabetes with insulin resistance has been maintained on high-dose Lantus of 180 units twice daily. She was recently diagnosed with AML and has been having fluctuating glucose levels.  Her PCP decreased her Lantus dose 120 twice daily , but she was having high readings, so she self increased her dose to 140 units twice daily alongside exenatide weekly injections.  She was previously on metformin when she was first diagnosed but did not tolerate.  She was noted to have a glucose of 98 on the morning of 5/26 and her Lantus was held.  She reports that she has been having decreased appetite with nausea and is eating about 50% of her meals.     She is currently receiving a five day induction chemotherapy regimen with decitabine and venetoclax. Her oncologist is Dr. Treviño of [facility].    Interval Problem Update  5/27/2022: Patient seen and examined. She reports continued nausea she attributes to the chemotherapy which is relieved by Zofran. Reports stress and shock secondary to her leukemia diagnosis. She is eating about half of her meals. Very eager to go home Sunday after chemotherapy is completed.    Discussed her am blood sugar of 61. Patient is very aware of her diabetic management and the relationship of blood sugars with food intake. We elected to leave her lantus dosing at 120 BID today and add in a Boost diabetic shake at night as a snack. We will reassess in the morning and adjust depending on her am blood glucose levels.     I have personally seen and examined the patient at bedside. I discussed the plan of  care with patient, bedside RN, charge RN,  and pharmacy    Consultants/Specialty  oncology    Code Status  Full Code    Disposition  Patient is not medically cleared for discharge.   Anticipate discharge to to home with close outpatient follow-up.  I have placed the appropriate orders for post-discharge needs.    Review of Systems  Review of Systems   Constitutional: Positive for malaise/fatigue. Negative for chills, fever and weight loss.   HENT: Negative for hearing loss.    Eyes: Positive for blurred vision.   Respiratory: Negative for cough and shortness of breath.    Cardiovascular: Negative for chest pain and palpitations.   Gastrointestinal: Negative for abdominal pain, diarrhea and heartburn.   Genitourinary: Negative for dysuria.   Musculoskeletal: Positive for back pain and joint pain.   Skin: Negative for rash.   Neurological: Positive for weakness. Negative for dizziness and headaches.   Psychiatric/Behavioral: Negative for depression. The patient is nervous/anxious.    All other systems reviewed and are negative.       Physical Exam  Temp:  [36.2 °C (97.2 °F)-37.1 °C (98.7 °F)] 36.2 °C (97.2 °F)  Pulse:  [69-75] 69  Resp:  [16-20] 19  BP: (109-145)/(50-68) 115/60  SpO2:  [92 %-95 %] 95 %    Physical Exam  Vitals and nursing note reviewed.   Constitutional:       General: She is not in acute distress.     Appearance: Normal appearance. She is obese.   HENT:      Head: Normocephalic and atraumatic.      Right Ear: External ear normal.      Left Ear: External ear normal.      Nose: Nose normal.      Mouth/Throat:      Mouth: Mucous membranes are moist.      Pharynx: Oropharynx is clear.   Eyes:      General: No scleral icterus.  Cardiovascular:      Rate and Rhythm: Normal rate and regular rhythm.      Pulses: Normal pulses.      Heart sounds: Normal heart sounds.   Pulmonary:      Effort: Pulmonary effort is normal. No respiratory distress.      Breath sounds: Normal breath sounds.    Abdominal:      General: Abdomen is flat. Bowel sounds are normal.      Palpations: Abdomen is soft.      Tenderness: There is no abdominal tenderness.   Musculoskeletal:         General: No swelling. Normal range of motion.      Cervical back: Normal range of motion and neck supple.   Skin:     General: Skin is warm and dry.      Capillary Refill: Capillary refill takes less than 2 seconds.   Neurological:      Mental Status: She is alert and oriented to person, place, and time.   Psychiatric:         Mood and Affect: Mood normal.         Behavior: Behavior normal.         Fluids    Intake/Output Summary (Last 24 hours) at 5/27/2022 0801  Last data filed at 5/26/2022 1655  Gross per 24 hour   Intake 100 ml   Output --   Net 100 ml       Laboratory  Recent Labs     05/25/22  0940 05/26/22  1302 05/27/22  0013   WBC 1.8* 2.0* 2.2*   RBC 2.47* 2.27* 2.18*   HEMOGLOBIN 8.7* 7.8* 7.6*   HEMATOCRIT 26.7* 25.0* 24.1*   .1* 110.1* 110.6*   MCH 35.2* 34.4* 34.9*   MCHC 32.6* 31.2* 31.5*   RDW 89.3* 94.2* 95.4*   PLATELETCT 101* 94* 75*   MPV 12.3 12.0 12.2     Recent Labs     05/25/22  0940 05/26/22  1302 05/27/22  0013   SODIUM 138 139 138   POTASSIUM 4.1 4.1 3.7   CHLORIDE 104 106 108   CO2 23 22 20   GLUCOSE 196* 174* 82   BUN 21 19 20   CREATININE 1.32 1.31 1.33   CALCIUM 9.5 9.1 9.2                   Imaging  No orders to display        Assessment/Plan  * Type 2 diabetes mellitus with hyperglycemia, with long-term current use of insulin ([facility])  Assessment & Plan  She reports that her most recent hemoglobin A1c was 7.1  Blood sugar this am 61, repeat 107  After extensive discussion with patient, we will leave her lantus dosing as is  -Added evening snack  -Monitor BGs and adjust insulin accordingly  -Reviewed with patient that due to to her AML and likely poor appetite with induction chemotherapy we might need to readjust her insulin on a daily basis depending on her p.o. intake and BG readings    Allergic  rhinitis  Assessment & Plan  We will resume her home dose of nightly Benadryl as needed which she has tolerated well  We will add saline nasal spray and nasal Atrovent  Avoid nasal steroids given her thrombocytopenia and increased risk of epistaxis    Dyslipidemia  Assessment & Plan  Continue fenofibrate    Benign essential HTN  Assessment & Plan  Stable on losartan and hydrochlorothiazide  Monitor electrolytes with thiazide therapy and replete accordingly    Hypothyroid  Assessment & Plan  Continue levothyroxine    AML (acute myelogenous leukemia) (HCC)- (present on admission)  Assessment & Plan  Chemotherapy per oncology  Monitor cbc and transfuse as needed  Neutropenic precautions        VTE prophylaxis: SCDs/TEDs    I have performed a physical exam and reviewed and updated ROS and Plan today (5/27/2022). In review of yesterday's note (5/26/2022), there are no changes except as documented above.

## 2022-05-27 NOTE — PROGRESS NOTES
"Pharmacy Chemotherapy Calculations    Patient Name: Zena Myers     Diagnosis: AML     Regimen: Induction       Modified Venetoclax dosing schedule 2/2 concomitant Voriconazole:   · 10 mg on Day 1   · 20 mg on Day 2   · 50 mg on Day 3   · 100 mg on Days 4 - 28     Dosing References: [id number]       Prophylaxis:  · Acyclovir 400 mg PO BID   · Voriconazole 200 mg PO BID   · Allopurinol not indicated at this time - Low risk for elevated uric acid per Oncologist.     Allergies: Clindamycin and Pcn [penicillins]       /60   Pulse 69   Temp 36.2 °C (97.2 °F) (Temporal)   Resp 19   Ht 1.651 m (5' 5\")   Wt 111 kg (245 lb 6 oz)   LMP 04/15/2005   SpO2 95%   BMI 40.83 kg/m²  Body surface area is 2.26 meters squared.       Weight Type Height Weight BSA Additional Details   Most recent 165.1 cm 111 kg 2.26 m² Weight as of 5/25/2022  8:23 AM; height as of 5/25/2022  8:23 AM           Drug Order   (Drug name, dose, route, IV Fluid & volume, frequency, number of doses) Cycle 1, Day 3 of 5       Previous treatment: N/A      Medication = Decitabine (DACOGEN)   Base Dose = 20 mg/m2   Calc Dose: Base Dose x 2.26 m2 = 45.2 mg  Final Dose = 45.2 mg  Route = IV  Fluid & Volume =  mL  Admin Duration = Over 60 minutes           OK to treat with final dose     By my signature below, I confirm this process was performed independently with the BSA and all final chemotherapy dosing calculations congruent. I have reviewed the above chemotherapy order and that my calculation of the final dose and BSA (when applicable) corroborate those calculations of the  pharmacist. Discrepancies of 5% or greater in the written dose have been addressed and documented within the [facility] Progress notes.    Signature: Megan Berger, Jena, BCPS          "

## 2022-05-27 NOTE — CARE PLAN
The patient is Stable - Low risk of patient condition declining or worsening    Shift Goals  Clinical Goals: resting  Patient Goals: getting through chemo    Progress made toward(s) clinical / shift goals:    Problem: Fall Risk  Goal: Patient will remain free from falls  Outcome: Progressing  Note: Patient calls appropriately, and waits for staff to assist to the bathroom. Bed alarm active.      Problem: Psychosocial  Goal: Patient and family will demonstrate ability to cope with life altering diagnosis and/or procedure  Outcome: Progressing  Note: Patient is not as anxious per patient, patient is sharing conversations with sister and expresses needs appropriately

## 2022-05-27 NOTE — PROGRESS NOTES
Chemotherapy Verification - SECONDARY RN  Cycle 1 Day 3       Height = 165.1 cm  Weight = 111 kg  BSA = 2.26 m2       Medication: Decitabine  Dose: 20 mg/m2  Calculated Dose: 45.2 mg (45.2 mg ordered)                             (In mg/m2, AUC, mg/kg)     I confirm that this process was performed independently.

## 2022-05-27 NOTE — PROGRESS NOTES
Oncology/Hematology Progress Note               Author: Tigist Mccoy M.D. Date & Time created: 5/27/2022  11:46 AM     CC: AML, admitted for induction chemotherapy with decitabine and venetoclax    Interval History:  Pt reports she slept much better last night.  She has had this dry cough for the past 5 to 6 months.  Patient reports that her primary care provider has determined its allergies.  She reports that she has had a prior chest x-ray which was normal.  It has not gotten any worse.  She did feel the Benadryl last night helped quite a bit.  She did have again some mild nausea after treatment, however was given antinausea medications with relief.  No fevers or shortness of breath.    Review of Systems:  Review of Systems   Constitutional: Positive for malaise/fatigue.   HENT: Negative.    Eyes: Negative.    Respiratory: Positive for cough. Negative for sputum production and shortness of breath.    Cardiovascular: Negative.    Gastrointestinal: Positive for nausea. Negative for vomiting.   Genitourinary: Negative.    Musculoskeletal: Negative.    Skin: Negative.    Neurological: Negative.    Endo/Heme/Allergies: Negative.    Psychiatric/Behavioral: Negative.        Physical Exam:  Physical Exam  Constitutional:       General: She is not in acute distress.     Appearance: She is normal weight. She is not toxic-appearing.   HENT:      Head: Normocephalic and atraumatic.      Right Ear: External ear normal.      Left Ear: External ear normal.      Nose: Nose normal.      Mouth/Throat:      Mouth: Mucous membranes are moist.      Pharynx: Oropharynx is clear. No oropharyngeal exudate.   Eyes:      General: No scleral icterus.     Conjunctiva/sclera: Conjunctivae normal.   Cardiovascular:      Rate and Rhythm: Normal rate and regular rhythm.      Heart sounds: No murmur heard.    No gallop.   Pulmonary:      Effort: Pulmonary effort is normal. No respiratory distress.      Breath sounds: Normal breath sounds. No  wheezing or rales.   Abdominal:      General: Abdomen is flat. Bowel sounds are normal. There is no distension.      Palpations: Abdomen is soft.      Tenderness: There is no abdominal tenderness.   Musculoskeletal:         General: No swelling or tenderness.      Cervical back: Neck supple. No tenderness.   Skin:     General: Skin is warm and dry.   Neurological:      General: No focal deficit present.      Mental Status: She is alert and oriented to person, place, and time.   Psychiatric:         Mood and Affect: Mood normal.         Behavior: Behavior normal.         Thought Content: Thought content normal.         Judgment: Judgment normal.         Labs:          Recent Labs     22  0940 22  1302 22  0013   SODIUM 138 139 138   POTASSIUM 4.1 4.1 3.7   CHLORIDE 104 106 108   CO2  22 20   BUN  20   CREATININE 1.32 1.31 1.33   CALCIUM 9.5 9.1 9.2     Recent Labs     22  0940 22  1302 22  0013   ALTSGPT 12 11 11   ASTSGOT 20 15 19   ALKPHOSPHAT 71 64 61   TBILIRUBIN 1.3 1.0 0.8   GLUCOSE 196* 174* 82     Recent Labs     22  0940 22  1302 22  0013   RBC 2.47* 2.27* 2.18*   HEMOGLOBIN 8.7* 7.8* 7.6*   HEMATOCRIT 26.7* 25.0* 24.1*   PLATELETCT 101* 94* 75*     Recent Labs     22  0940 22  1302 22  0013   WBC 1.8* 2.0* 2.2*   NEUTSPOLYS 20.40* 29.10* 28.80*   LYMPHOCYTES 73.10* 67.30* 66.70*   MONOCYTES 2.80 1.80 0.90   EOSINOPHILS 0.90 0.00 0.00   BASOPHILS 2.80* 1.80 0.90   ASTSGOT 20 15 19   ALTSGPT 12 11 11   ALKPHOSPHAT 71 64 61   TBILIRUBIN 1.3 1.0 0.8     Recent Labs     22  0940 22  1302 22  0013   SODIUM 138 139 138   POTASSIUM 4.1 4.1 3.7   CHLORIDE 104 106 108   CO2  22 20   GLUCOSE 196* 174* 82   BUN  20   CREATININE 1.32 1.31 1.33   CALCIUM 9.5 9.1 9.2     Hemodynamics:  Temp (24hrs), Av.6 °C (97.9 °F), Min:36.2 °C (97.2 °F), Max:37.1 °C (98.7 °F)  Temperature: 36.2 °C (97.2 °F)  Pulse  Av.8   Min: 67  Max: 78   Blood Pressure : 115/60     Respiratory:    Respiration: 19, Pulse Oximetry: 95 %        RUL Breath Sounds: Clear, RML Breath Sounds: Clear, RLL Breath Sounds: Diminished, TICO Breath Sounds: Clear, LLL Breath Sounds: Diminished  Fluids:    Intake/Output Summary (Last 24 hours) at 5/26/2022 1026  Last data filed at 5/25/2022 1650  Gross per 24 hour   Intake 100 ml   Output --   Net 100 ml        GI/Nutrition:  Orders Placed This Encounter   Procedures   • Diet Order Diet: Consistent CHO (Diabetic)     Standing Status:   Standing     Number of Occurrences:   1     Order Specific Question:   Diet:     Answer:   Consistent CHO (Diabetic) [4]     Medical Decision Making, by Problem:  Active Hospital Problems    Diagnosis    • *Type 2 diabetes mellitus with hyperglycemia, with long-term current use of insulin (HCC) [E11.65, Z79.4]    • Hypothyroid [E03.9]    • Benign essential HTN [I10]    • Dyslipidemia [E78.5]    • Allergic rhinitis [J30.9]    • AML (acute myelogenous leukemia) ([facility]) [C92.00]        Assessment and Plan:    # AML with myelodysplastic changes - patient presented with pancytopenia.  Bone marrow biopsy from 4/19/2022 shows a hypercellular marrow with 20% myeloblasts.  Cytogenetics were normal, FISH negative, negative for FLT3, IDH1/2, NPMN1, and TP53 mutations. Positive for single CEBPA mutation.  - Admitted yesterday by Dr. Treviño to start decitabine and venetoclax.  Low intensity regimen was selected secondary to her age and other underlying medical comorbidity  - Decitabine 20 mg/m2 IV daily x 5 days  - Venetoclax - ramp up to 100 mg daily due to vori interaction. Pt reports she has home medication for discharge.  - Monitor labs - no evidence of TLS, will stop fluids and monitor labs. Encouraged PO hydration.    # Pancytopenia 2/2 chemotherapy  - transfuse to keep hemoglobin > 7, platelets > 10, standing orders placed     # Type 2 DM  - pt self reports taking 140 U lantus BID  -  hospital medicine has been consulted, they reduce her insulin to 120 BID, defer management to hospital team    # Hypertension  - resume home meds    # Hyothyroidism  - resume home levothyroxine    # Cough, nonproductive  - present for months, pt reports allergies, reports normal CXR in the past. Monitor repeat imaging with worsening symptoms.    # PPX  - Voriconazole  - Acyclovir        Quality-Core Measures   Reviewed items::  Labs reviewed and Medications reviewed  Tong catheter::  No Tong  DVT prophylaxis pharmacological::  Not indicated at this time, ambulatory and Contraindicated - High bleeding risk

## 2022-05-27 NOTE — PROGRESS NOTES
Pt had blood sugar of 61- 8 ounces of orange juice given, repeated FSBG 15 minutes later, 4 ounces of orange juice offered- patient declined because breakfast is coming soon. Updated on call hospitalist Zenaida HOWARD, per Zenaida update day team and make sure they are aware. Will update day nurse.

## 2022-05-27 NOTE — HOSPITAL COURSE
66 y.o. female who presented 5/25/2022 with newly diagnosed AML admitted for induction chemotherapy.  The hospitalist service was consulted to assist in management of her blood glucose levels. Patient has a history of type 2 diabetes with insulin resistance has been maintained on high-dose Lantus of 180 units twice daily. She was recently diagnosed with AML and has been having fluctuating glucose levels.  Her PCP decreased her Lantus dose 120 twice daily , but she was having high readings, so she self increased her dose to 140 units twice daily alongside exenatide weekly injections.  She was previously on metformin when she was first diagnosed but did not tolerate.  She was noted to have a glucose of 98 on the morning of 5/26 and her Lantus was held.  She reports that she has been having decreased appetite with nausea and is eating about 50% of her meals.     Patient underwent a five day induction chemotherapy regimen with decitabine and venetoclax. Her oncologist is Dr. Treviño of [facility].  Regimen tolerated with associated nausea. She had a slight JASON on day 4/5 which resolved with IV fluids. She has already had her home chemotherapy delivered.     During her hospitalization, she had variable blood sugars and her long acting insulin regimen was adjusted accordingly. She also suffered from cluster headaches relieved by sumatriptan. Patient seen and examined on day of discharge. It was agreed that she will discharge on 120 mg BID of lantus and the patient knows to adjust the dose accordingly with her PO intake and blood sugar readings. Her sister is flying in from [place] to stay with her for one week. All required medications were sent to the patient's pharmacy in [place]. Plan of care discussed and all questions were answered.

## 2022-05-27 NOTE — PROGRESS NOTES
Chemotherapy Verification - PRIMARY RN      Height = 165.1  Weight = 111kg  BSA = 2.26m2       Medication: Dacogen  Dose: 20mg/m2  Calculated Dose: 45.2mg (Ordered dose 45.2mg)                             (In mg/m2, AUC, mg/kg)       I confirm this process was performed independently with the BSA and all final chemotherapy dosing calculations congruent.  Any discrepancies of 10% or greater have been addressed with the chemotherapy pharmacist. The resolution of the discrepancy has been documented in the EPIC progress notes.

## 2022-05-27 NOTE — DISCHARGE PLANNING
Case Management Discharge Planning    Admission Date: 5/25/2022  GMLOS: 4.4  ALOS: 2    6-Clicks ADL Score: 24  6-Clicks Mobility Score: 22      Anticipated Discharge Dispo: Discharge Disposition: Discharged to home/self care (01)    DME Needed: No    Action(s) Taken: Updated Provider/Nurse on Discharge Plan    Escalations Completed: None    Medically Clear: No    Next Steps: CM will continue to follow and assist with any discharge needs.  Barriers to Discharge: Medical clearance    Is the patient up for discharge tomorrow: No

## 2022-05-28 PROBLEM — N17.9 ACUTE KIDNEY INJURY (HCC): Status: ACTIVE | Noted: 2022-05-28

## 2022-05-28 PROBLEM — G44.009 CLUSTER HEADACHE: Status: ACTIVE | Noted: 2022-05-28

## 2022-05-28 LAB
ALBUMIN SERPL BCP-MCNC: 3.9 G/DL (ref 3.2–4.9)
ALBUMIN/GLOB SERPL: 1.7 G/DL
ALP SERPL-CCNC: 64 U/L (ref 30–99)
ALT SERPL-CCNC: 14 U/L (ref 2–50)
ANION GAP SERPL CALC-SCNC: 9 MMOL/L (ref 7–16)
AST SERPL-CCNC: 19 U/L (ref 12–45)
BASOPHILS # BLD AUTO: 0 % (ref 0–1.8)
BASOPHILS # BLD: 0 K/UL (ref 0–0.12)
BILIRUB SERPL-MCNC: 1 MG/DL (ref 0.1–1.5)
BUN SERPL-MCNC: 22 MG/DL (ref 8–22)
CALCIUM SERPL-MCNC: 10 MG/DL (ref 8.5–10.5)
CHLORIDE SERPL-SCNC: 104 MMOL/L (ref 96–112)
CO2 SERPL-SCNC: 22 MMOL/L (ref 20–33)
CREAT SERPL-MCNC: 1.48 MG/DL (ref 0.5–1.4)
EOSINOPHIL # BLD AUTO: 0 K/UL (ref 0–0.51)
EOSINOPHIL NFR BLD: 0 % (ref 0–6.9)
ERYTHROCYTE [DISTWIDTH] IN BLOOD BY AUTOMATED COUNT: 93.8 FL (ref 35.9–50)
GFR SERPLBLD CREATININE-BSD FMLA CKD-EPI: 38 ML/MIN/1.73 M 2
GLOBULIN SER CALC-MCNC: 2.3 G/DL (ref 1.9–3.5)
GLUCOSE BLD STRIP.AUTO-MCNC: 128 MG/DL (ref 65–99)
GLUCOSE BLD STRIP.AUTO-MCNC: 131 MG/DL (ref 65–99)
GLUCOSE BLD STRIP.AUTO-MCNC: 141 MG/DL (ref 65–99)
GLUCOSE BLD STRIP.AUTO-MCNC: 147 MG/DL (ref 65–99)
GLUCOSE BLD STRIP.AUTO-MCNC: 163 MG/DL (ref 65–99)
GLUCOSE BLD STRIP.AUTO-MCNC: 52 MG/DL (ref 65–99)
GLUCOSE BLD STRIP.AUTO-MCNC: 95 MG/DL (ref 65–99)
GLUCOSE BLD STRIP.AUTO-MCNC: 99 MG/DL (ref 65–99)
GLUCOSE SERPL-MCNC: 105 MG/DL (ref 65–99)
HCT VFR BLD AUTO: 23.6 % (ref 37–47)
HGB BLD-MCNC: 7.6 G/DL (ref 12–16)
LDH SERPL L TO P-CCNC: 237 U/L (ref 107–266)
LYMPHOCYTES # BLD AUTO: 0.69 K/UL (ref 1–4.8)
LYMPHOCYTES NFR BLD: 31.3 % (ref 22–41)
MAGNESIUM SERPL-MCNC: 2.1 MG/DL (ref 1.5–2.5)
MANUAL DIFF BLD: NORMAL
MCH RBC QN AUTO: 35.5 PG (ref 27–33)
MCHC RBC AUTO-ENTMCNC: 32.2 G/DL (ref 33.6–35)
MCV RBC AUTO: 110.3 FL (ref 81.4–97.8)
MONOCYTES # BLD AUTO: 0 K/UL (ref 0–0.85)
MONOCYTES NFR BLD AUTO: 0 % (ref 0–13.4)
MORPHOLOGY BLD-IMP: NORMAL
NEUTROPHILS # BLD AUTO: 1.51 K/UL (ref 2–7.15)
NEUTROPHILS NFR BLD: 68.7 % (ref 44–72)
NRBC # BLD AUTO: 0.09 K/UL
NRBC BLD-RTO: 4.1 /100 WBC
PHOSPHATE SERPL-MCNC: 4.7 MG/DL (ref 2.5–4.5)
PLATELET # BLD AUTO: 79 K/UL (ref 164–446)
PLATELET BLD QL SMEAR: NORMAL
PMV BLD AUTO: 12.4 FL (ref 9–12.9)
POTASSIUM SERPL-SCNC: 3.8 MMOL/L (ref 3.6–5.5)
PROT SERPL-MCNC: 6.2 G/DL (ref 6–8.2)
RBC # BLD AUTO: 2.14 M/UL (ref 4.2–5.4)
RBC BLD AUTO: NORMAL
SODIUM SERPL-SCNC: 135 MMOL/L (ref 135–145)
URATE SERPL-MCNC: 4.2 MG/DL (ref 1.9–8.2)
WBC # BLD AUTO: 2.2 K/UL (ref 4.8–10.8)

## 2022-05-28 PROCEDURE — 99233 SBSQ HOSP IP/OBS HIGH 50: CPT | Performed by: NURSE PRACTITIONER

## 2022-05-28 PROCEDURE — 700111 HCHG RX REV CODE 636 W/ 250 OVERRIDE (IP): Mod: JG | Performed by: INTERNAL MEDICINE

## 2022-05-28 PROCEDURE — 700102 HCHG RX REV CODE 250 W/ 637 OVERRIDE(OP): Performed by: INTERNAL MEDICINE

## 2022-05-28 PROCEDURE — 84100 ASSAY OF PHOSPHORUS: CPT

## 2022-05-28 PROCEDURE — 80053 COMPREHEN METABOLIC PANEL: CPT

## 2022-05-28 PROCEDURE — A9270 NON-COVERED ITEM OR SERVICE: HCPCS | Performed by: HOSPITALIST

## 2022-05-28 PROCEDURE — A9270 NON-COVERED ITEM OR SERVICE: HCPCS | Performed by: NURSE PRACTITIONER

## 2022-05-28 PROCEDURE — 700111 HCHG RX REV CODE 636 W/ 250 OVERRIDE (IP): Mod: JG

## 2022-05-28 PROCEDURE — 85025 COMPLETE CBC W/AUTO DIFF WBC: CPT

## 2022-05-28 PROCEDURE — 36415 COLL VENOUS BLD VENIPUNCTURE: CPT

## 2022-05-28 PROCEDURE — 700102 HCHG RX REV CODE 250 W/ 637 OVERRIDE(OP): Performed by: HOSPITALIST

## 2022-05-28 PROCEDURE — 83615 LACTATE (LD) (LDH) ENZYME: CPT

## 2022-05-28 PROCEDURE — 700105 HCHG RX REV CODE 258: Performed by: INTERNAL MEDICINE

## 2022-05-28 PROCEDURE — 85007 BL SMEAR W/DIFF WBC COUNT: CPT

## 2022-05-28 PROCEDURE — 770004 HCHG ROOM/CARE - ONCOLOGY PRIVATE *

## 2022-05-28 PROCEDURE — 83735 ASSAY OF MAGNESIUM: CPT

## 2022-05-28 PROCEDURE — A9270 NON-COVERED ITEM OR SERVICE: HCPCS | Performed by: INTERNAL MEDICINE

## 2022-05-28 PROCEDURE — 700102 HCHG RX REV CODE 250 W/ 637 OVERRIDE(OP): Performed by: NURSE PRACTITIONER

## 2022-05-28 PROCEDURE — 700105 HCHG RX REV CODE 258

## 2022-05-28 PROCEDURE — 84550 ASSAY OF BLOOD/URIC ACID: CPT

## 2022-05-28 PROCEDURE — 700111 HCHG RX REV CODE 636 W/ 250 OVERRIDE (IP): Performed by: HOSPITALIST

## 2022-05-28 PROCEDURE — 82962 GLUCOSE BLOOD TEST: CPT | Mod: 91

## 2022-05-28 RX ORDER — SODIUM CHLORIDE 9 MG/ML
INJECTION, SOLUTION INTRAVENOUS CONTINUOUS
Status: DISCONTINUED | OUTPATIENT
Start: 2022-05-28 | End: 2022-05-29 | Stop reason: HOSPADM

## 2022-05-28 RX ORDER — POLYETHYLENE GLYCOL 3350 17 G/17G
1 POWDER, FOR SOLUTION ORAL
Status: DISCONTINUED | OUTPATIENT
Start: 2022-05-28 | End: 2022-05-29 | Stop reason: HOSPADM

## 2022-05-28 RX ORDER — SUMATRIPTAN 25 MG/1
25 TABLET, FILM COATED ORAL
Status: DISCONTINUED | OUTPATIENT
Start: 2022-05-28 | End: 2022-05-29 | Stop reason: HOSPADM

## 2022-05-28 RX ORDER — BUTALBITAL, ACETAMINOPHEN AND CAFFEINE 50; 325; 40 MG/1; MG/1; MG/1
1 TABLET ORAL EVERY 6 HOURS PRN
Status: DISCONTINUED | OUTPATIENT
Start: 2022-05-28 | End: 2022-05-29 | Stop reason: HOSPADM

## 2022-05-28 RX ADMIN — SODIUM CHLORIDE: 9 INJECTION, SOLUTION INTRAVENOUS at 22:15

## 2022-05-28 RX ADMIN — LORATADINE 10 MG: 10 TABLET ORAL at 09:49

## 2022-05-28 RX ADMIN — FENOFIBRATE 134 MG: 134 CAPSULE ORAL at 09:48

## 2022-05-28 RX ADMIN — VORICONAZOLE 200 MG: 200 TABLET ORAL at 20:01

## 2022-05-28 RX ADMIN — ZONISAMIDE 150 MG: 50 CAPSULE ORAL at 09:50

## 2022-05-28 RX ADMIN — BUTALBITAL, ACETAMINOPHEN, AND CAFFEINE 1 TABLET: 50; 325; 40 TABLET ORAL at 08:19

## 2022-05-28 RX ADMIN — LOSARTAN POTASSIUM 100 MG: 50 TABLET, FILM COATED ORAL at 09:49

## 2022-05-28 RX ADMIN — ONDANSETRON HYDROCHLORIDE 4 MG: 2 SOLUTION INTRAMUSCULAR; INTRAVENOUS at 07:39

## 2022-05-28 RX ADMIN — SUMATRIPTAN SUCCINATE 25 MG: 25 TABLET ORAL at 20:09

## 2022-05-28 RX ADMIN — ACYCLOVIR 400 MG: 200 CAPSULE ORAL at 09:48

## 2022-05-28 RX ADMIN — VENETOCLAX 100 MG: 100 TABLET, FILM COATED ORAL at 16:43

## 2022-05-28 RX ADMIN — SUMATRIPTAN SUCCINATE 25 MG: 25 TABLET ORAL at 11:34

## 2022-05-28 RX ADMIN — LEVOTHYROXINE SODIUM 175 MCG: 0.12 TABLET ORAL at 05:02

## 2022-05-28 RX ADMIN — VORICONAZOLE 200 MG: 200 TABLET ORAL at 05:02

## 2022-05-28 RX ADMIN — POLYETHYLENE GLYCOL 3350 1 PACKET: 17 POWDER, FOR SOLUTION ORAL at 13:16

## 2022-05-28 RX ADMIN — DECITABINE 45.2 MG: 50 INJECTION, POWDER, LYOPHILIZED, FOR SOLUTION INTRAVENOUS at 13:44

## 2022-05-28 RX ADMIN — SODIUM CHLORIDE: 9 INJECTION, SOLUTION INTRAVENOUS at 09:51

## 2022-05-28 RX ADMIN — BUTALBITAL, ACETAMINOPHEN, AND CAFFEINE 1 TABLET: 50; 325; 40 TABLET ORAL at 14:59

## 2022-05-28 RX ADMIN — HYDROCHLOROTHIAZIDE 12.5 MG: 25 TABLET ORAL at 09:49

## 2022-05-28 RX ADMIN — ACYCLOVIR 400 MG: 200 CAPSULE ORAL at 16:43

## 2022-05-28 RX ADMIN — IPRATROPIUM BROMIDE 2 SPRAY: 21 SPRAY NASAL at 09:51

## 2022-05-28 RX ADMIN — BUPROPION HYDROCHLORIDE 200 MG: 100 TABLET, FILM COATED, EXTENDED RELEASE ORAL at 09:48

## 2022-05-28 RX ADMIN — INSULIN LISPRO 2 UNITS: 100 INJECTION, SOLUTION INTRAVENOUS; SUBCUTANEOUS at 11:24

## 2022-05-28 RX ADMIN — DIPHENHYDRAMINE HYDROCHLORIDE 25 MG: 25 TABLET ORAL at 20:00

## 2022-05-28 ASSESSMENT — ENCOUNTER SYMPTOMS
BACK PAIN: 1
PALPITATIONS: 0
COUGH: 1
VOMITING: 0
BLURRED VISION: 1
DIARRHEA: 0
SHORTNESS OF BREATH: 0
COUGH: 0
WEAKNESS: 1
HEARTBURN: 0
NEUROLOGICAL NEGATIVE: 1
CARDIOVASCULAR NEGATIVE: 1
ABDOMINAL PAIN: 0
EYES NEGATIVE: 1
SPUTUM PRODUCTION: 0
MUSCULOSKELETAL NEGATIVE: 1
DEPRESSION: 0
PSYCHIATRIC NEGATIVE: 1
CHILLS: 0
FEVER: 0
WEIGHT LOSS: 0
HEADACHES: 0
DIZZINESS: 0
NERVOUS/ANXIOUS: 1
NAUSEA: 1

## 2022-05-28 ASSESSMENT — PAIN DESCRIPTION - PAIN TYPE
TYPE: ACUTE PAIN

## 2022-05-28 NOTE — ASSESSMENT & PLAN NOTE
Per patient, suffers with cluster headaches  Usually takes over the counter migraine medicine    Fioricet q6 prn headache

## 2022-05-28 NOTE — PROGRESS NOTES
Patients FSBS was 52, Patient refusing to drink orange juice. Given Sprite instead will recheck in 15. MD notified of blood sugar.

## 2022-05-28 NOTE — ASSESSMENT & PLAN NOTE
Creatine elevated to 1.48 this am  Discussed with pharmacy, likely side effect of Dacogen    -Started on gentle IV fluid hydration NS @83cc/hr  -Will alert Dr. Rubi as patient is scheduled for dose 4/5 today

## 2022-05-28 NOTE — PROGRESS NOTES
Oncology/Hematology Progress Note               Author: Tigist Mccoy M.D. Date & Time created: 5/28/2022  12:38 PM     CC: AML, admitted for induction chemotherapy with decitabine and venetoclax    Interval History:  Pt reports a headache this morning. She has a history of migraines. She had some nausea again after chemo. She wants to keep going with chemo however. No other neuro symptoms.    Review of Systems:  Review of Systems   Constitutional: Positive for malaise/fatigue.   HENT: Negative.    Eyes: Negative.    Respiratory: Positive for cough. Negative for sputum production and shortness of breath.    Cardiovascular: Negative.    Gastrointestinal: Positive for nausea. Negative for vomiting.   Genitourinary: Negative.    Musculoskeletal: Negative.    Skin: Negative.    Neurological: Negative.    Endo/Heme/Allergies: Negative.    Psychiatric/Behavioral: Negative.        Physical Exam:  Physical Exam  Constitutional:       General: She is not in acute distress.     Appearance: She is normal weight. She is not toxic-appearing.   HENT:      Head: Normocephalic and atraumatic.      Right Ear: External ear normal.      Left Ear: External ear normal.      Nose: Nose normal.      Mouth/Throat:      Mouth: Mucous membranes are moist.      Pharynx: Oropharynx is clear. No oropharyngeal exudate.   Eyes:      General: No scleral icterus.     Conjunctiva/sclera: Conjunctivae normal.   Cardiovascular:      Rate and Rhythm: Normal rate and regular rhythm.      Heart sounds: No murmur heard.    No gallop.   Pulmonary:      Effort: Pulmonary effort is normal. No respiratory distress.      Breath sounds: Normal breath sounds. No wheezing or rales.   Abdominal:      General: Abdomen is flat. Bowel sounds are normal. There is no distension.      Palpations: Abdomen is soft.      Tenderness: There is no abdominal tenderness.   Musculoskeletal:         General: No swelling or tenderness.      Cervical back: Neck supple. No  tenderness.   Skin:     General: Skin is warm and dry.   Neurological:      General: No focal deficit present.      Mental Status: She is alert and oriented to person, place, and time.   Psychiatric:         Mood and Affect: Mood normal.         Behavior: Behavior normal.         Thought Content: Thought content normal.         Judgment: Judgment normal.         Labs:          Recent Labs     22  1302 22  0013 22  0035   SODIUM 139 138 135   POTASSIUM 4.1 3.7 3.8   CHLORIDE 106 108 104   CO2    BUN    CREATININE 1.31 1.33 1.48*   MAGNESIUM  --   --  2.1   PHOSPHORUS  --   --  4.7*   CALCIUM 9.1 9.2 10.0     Recent Labs     22  1302 22  0013 22  0035   ALTSGPT 11 11 14   ASTSGOT 15 19 19   ALKPHOSPHAT 64 61 64   TBILIRUBIN 1.0 0.8 1.0   GLUCOSE 174* 82 105*     Recent Labs     22  1302 22  0013 22  0035   RBC 2.27* 2.18* 2.14*   HEMOGLOBIN 7.8* 7.6* 7.6*   HEMATOCRIT 25.0* 24.1* 23.6*   PLATELETCT 94* 75* 79*     Recent Labs     222 22  0013 22  0035   WBC 2.0* 2.2* 2.2*   NEUTSPOLYS 29.10* 28.80* 68.70   LYMPHOCYTES 67.30* 66.70* 31.30   MONOCYTES 1.80 0.90 0.00   EOSINOPHILS 0.00 0.00 0.00   BASOPHILS 1.80 0.90 0.00   ASTSGOT 15 19 19   ALTSGPT 11 11 14   ALKPHOSPHAT 64 61 64   TBILIRUBIN 1.0 0.8 1.0     Recent Labs     22  1302 22  0013 22  003   SODIUM 139 138 135   POTASSIUM 4.1 3.7 3.8   CHLORIDE 106 108 104   CO2    GLUCOSE 174* 82 105*   BUN    CREATININE 1.31 1.33 1.48*   CALCIUM 9.1 9.2 10.0     Hemodynamics:  Temp (24hrs), Av.6 °C (97.9 °F), Min:36.2 °C (97.2 °F), Max:36.9 °C (98.5 °F)  Temperature: 36.7 °C (98 °F)  Pulse  Av  Min: 67  Max: 89   Blood Pressure : 118/62     Respiratory:    Respiration: 20, Pulse Oximetry: 94 %     Work Of Breathing / Effort: Within Normal Limits  RUL Breath Sounds: Clear, RML Breath Sounds: Clear, RLL Breath Sounds: Diminished, TICO Breath  Sounds: Clear, LLL Breath Sounds: Diminished  Fluids:    Intake/Output Summary (Last 24 hours) at 5/26/2022 1026  Last data filed at 5/25/2022 1650  Gross per 24 hour   Intake 100 ml   Output --   Net 100 ml        GI/Nutrition:  Orders Placed This Encounter   Procedures   • Diet Order Diet: Consistent CHO (Diabetic)     Standing Status:   Standing     Number of Occurrences:   1     Order Specific Question:   Diet:     Answer:   Consistent CHO (Diabetic) [4]     Medical Decision Making, by Problem:  Active Hospital Problems    Diagnosis    • *Type 2 diabetes mellitus with hyperglycemia, with long-term current use of insulin (HCC) [E11.65, Z79.4]    • Hypothyroid [E03.9]    • Benign essential HTN [I10]    • Dyslipidemia [E78.5]    • Allergic rhinitis [J30.9]    • AML (acute myelogenous leukemia) ([facility]) [C92.00]        Assessment and Plan:    # AML with myelodysplastic changes - patient presented with pancytopenia.  Bone marrow biopsy from 4/19/2022 shows a hypercellular marrow with 20% myeloblasts.  Cytogenetics were normal, FISH negative, negative for FLT3, IDH1/2, NPMN1, and TP53 mutations. Positive for single CEBPA mutation.  - Admitted yesterday by Dr. Treviño to start decitabine and venetoclax.  Low intensity regimen was selected secondary to her age and other underlying medical comorbidity  - Decitabine 20 mg/m2 IV daily x 5 days  - Venetoclax - ramp up to 100 mg daily due to vori interaction. Pt reports she has home medication for discharge.  - Monitor labs - no evidence of TLS    # Pancytopenia 2/2 chemotherapy  - transfuse to keep hemoglobin > 7, platelets > 10, standing orders placed     # Type 2 DM  - pt self reports taking 140 U lantus BID  - hospital medicine has been consulted, they reduce her insulin to 120 BID, defer management to hospital team    # Hypertension  - resume home meds    # Hyothyroidism  - resume home levothyroxine    # Cough, nonproductive  - present for months, pt reports allergies,  reports normal CXR in the past. Monitor repeat imaging with worsening symptoms.    # Headache  - starting Fioricet    # Creatinine  - slight increase today at 1.48, re-started fluids.    # PPX  - Voriconazole  - Acyclovir        Quality-Core Measures   Reviewed items::  Labs reviewed and Medications reviewed  Tong catheter::  No Tong  DVT prophylaxis pharmacological::  Not indicated at this time, ambulatory and Contraindicated - High bleeding risk

## 2022-05-28 NOTE — PROGRESS NOTES
Chemotherapy Verification - SECONDARY RN       Height = 165.1 cm  Weight = 111 kg  BSA = 2.26 m2       Medication: Decitabine  Dose: 20 mg/m2  Calculated Dose: 45.2 mg                      Ordered dose: 45.2 mg       (In mg/m2, AUC, mg/kg)       I confirm that this process was performed independently.

## 2022-05-28 NOTE — PROGRESS NOTES
"Pharmacy Chemotherapy Calculations    Patient Name: Zena Myers     Diagnosis: AML     Regimen: Induction       Modified Venetoclax dosing schedule 2/2 concomitant Voriconazole:   · 10 mg on Day 1   · 20 mg on Day 2   · 50 mg on Day 3   · 100 mg on Days 4 - 28       Dosing References: [id number]       Prophylaxis:  · Acyclovir 400 mg PO BID   · Voriconazole 200 mg PO BID   · Allopurinol not indicated at this time - Low risk for elevated uric acid per Oncologist.     Allergies: Clindamycin and Pcn [penicillins]       /62   Pulse 89   Temp 36.7 °C (98 °F) (Temporal)   Resp 20   Ht 1.651 m (5' 5\")   Wt 111 kg (245 lb 6 oz)   LMP 04/15/2005   SpO2 94%   BMI 40.83 kg/m²  Body surface area is 2.26 meters squared.       Weight Type Height Weight BSA Additional Details   Most recent 165.1 cm 111 kg 2.26 m² Weight as of 5/25/2022  8:23 AM; height as of 5/25/2022  8:23 AM           Drug Order   (Drug name, dose, route, IV Fluid & volume, frequency, number of doses) Cycle 1, Day 4 of 5       Previous treatment: N/A      Medication = Decitabine (DACOGEN)   Base Dose = 20 mg/m2   Calc Dose: Base Dose x 2.26 m2 = 45.2 mg  Final Dose = 45.2 mg  Route = IV  Fluid & Volume =  mL  Admin Duration = Over 60 minutes           <10% difference, okay to treat with final dose     By my signature below, I confirm this process was performed independently with the BSA and all final chemotherapy dosing calculations congruent. I have reviewed the above chemotherapy order and that my calculation of the final dose and BSA (when applicable) corroborate those calculations of the  pharmacist. Discrepancies of 10% or greater in the written dose have been addressed and documented within the [facility] Progress notes.      Kamryn Ruff, PharmD  "

## 2022-05-28 NOTE — PROGRESS NOTES
Patient FSBS 95, refusing juice or a snack at this time. Patient states she likes her BS to be at 80 before she eats breakfast.

## 2022-05-28 NOTE — CARE PLAN
Problem: Knowledge Deficit - Standard  Goal: Patient and family/care givers will demonstrate understanding of plan of care, disease process/condition, diagnostic tests and medications  Outcome: Progressing     Problem: Fall Risk  Goal: Patient will remain free from falls  Outcome: Progressing     Problem: Psychosocial  Goal: Patient's level of anxiety will decrease  Outcome: Progressing     Problem: Hemodynamics  Goal: Patient's hemodynamics, fluid balance and neurologic status will be stable or improve  Outcome: Progressing     Problem: Respiratory  Goal: Patient will achieve/maintain optimum respiratory ventilation and gas exchange  Outcome: Progressing     Problem: Urinary Elimination  Goal: Establish and maintain regular urinary output  Outcome: Progressing     Problem: Mobility  Goal: Patient's capacity to carry out activities will improve  Outcome: Progressing     Problem: Self Care  Goal: Patient will have the ability to perform ADLs independently or with assistance (bathe, groom, dress, toilet and feed)  Outcome: Progressing     Problem: Pain - Standard  Goal: Alleviation of pain or a reduction in pain to the patient’s comfort goal  Outcome: Progressing   The patient is Stable - Low risk of patient condition declining or worsening    Shift Goals  Clinical Goals: chemotherapy, pain control  Patient Goals: Rest, control cough    Progress made toward(s) clinical / shift goals:      Patient is not progressing towards the following goals:    Pt AOX4. Pt complains about headache- Fioricet, Imitrex and ice pack given with positive results. PIV patent with positive blood return running IV fluids. Zofran given for nausea. Last BM 05/27. Pt up with standby assist. Chemotherapy cycle 1 day 4.

## 2022-05-28 NOTE — CARE PLAN
Problem: Knowledge Deficit - Standard  Goal: Patient and family/care givers will demonstrate understanding of plan of care, disease process/condition, diagnostic tests and medications  Outcome: Progressing     Problem: Fall Risk  Goal: Patient will remain free from falls  Outcome: Progressing     Problem: Psychosocial  Goal: Patient's level of anxiety will decrease  Outcome: Progressing   The patient is Watcher - Medium risk of patient condition declining or worsening    Shift Goals  Clinical Goals: Rest, monitor labs  Patient Goals: Rest, control cough

## 2022-05-28 NOTE — CARE PLAN
Problem: Knowledge Deficit - Standard  Goal: Patient and family/care givers will demonstrate understanding of plan of care, disease process/condition, diagnostic tests and medications  5/27/2022 2325 by Brittani Parker R.N.  Outcome: Progressing  5/27/2022 2313 by Brittani Parker R.N.  Outcome: Progressing     Problem: Fall Risk  Goal: Patient will remain free from falls  5/27/2022 2325 by Brittani Parker R.N.  Outcome: Progressing  5/27/2022 2313 by Brittani Parker R.N.  Outcome: Progressing     Problem: Psychosocial  Goal: Patient's level of anxiety will decrease  Outcome: Progressing     Problem: Communication  Goal: The ability to communicate needs accurately and effectively will improve  Outcome: Progressing   The patient is Watcher - Medium risk of patient condition declining or worsening    Shift Goals  Clinical Goals: Rest, monitor labs  Patient Goals: Rest, control cough

## 2022-05-28 NOTE — PROGRESS NOTES
Hospital Medicine Daily Progress Note    Date of Service  5/28/2022    Chief Complaint  Zena Myers is a 66 y.o. female admitted 5/25/2022 for induction chemotherapy    Hospital Course  66 y.o. female who presented 5/25/2022 with newly diagnosed AML admitted for induction chemotherapy.  The hospitalist service was consulted to assist in management of her blood glucose levels. Patient has a history of type 2 diabetes with insulin resistance has been maintained on high-dose Lantus of 180 units twice daily. She was recently diagnosed with AML and has been having fluctuating glucose levels.  Her PCP decreased her Lantus dose 120 twice daily , but she was having high readings, so she self increased her dose to 140 units twice daily alongside exenatide weekly injections.  She was previously on metformin when she was first diagnosed but did not tolerate.  She was noted to have a glucose of 98 on the morning of 5/26 and her Lantus was held.  She reports that she has been having decreased appetite with nausea and is eating about 50% of her meals.     She is currently receiving a five day induction chemotherapy regimen with decitabine and venetoclax. Her oncologist is Dr. Treviño of [facility] [place].    Interval Problem Update  5/28/2022: Patient seen and examined.  Complaining of significant cluster headache with associated nausea, fioricet ordered with significant relief. Fasting blood sugar this am 52, decreased lantus dose to 100 BID, patient refused am Lantus dose. She is worried about how she will manage these side effects on future chemotherapy treatments at home.     Creatinine 1.48 this am, discussed with pharmacy, likely side effect of chemotherapy treatment. Started gentle IVF @83 cc/hr and will alert Dr. Rubi.     I have personally seen and examined the patient at bedside. I discussed the plan of care with patient, bedside RN, charge RN,  and pharmacy    Consultants/Specialty  oncology    Code Status  Full  Code    Disposition  Patient is not medically cleared for discharge.   Anticipate discharge to to home with close outpatient follow-up.  I have placed the appropriate orders for post-discharge needs.    Review of Systems  Review of Systems   Constitutional: Positive for malaise/fatigue. Negative for chills, fever and weight loss.   HENT: Negative for hearing loss.    Eyes: Positive for blurred vision.   Respiratory: Negative for cough and shortness of breath.    Cardiovascular: Negative for chest pain and palpitations.   Gastrointestinal: Negative for abdominal pain, diarrhea and heartburn.   Genitourinary: Negative for dysuria.   Musculoskeletal: Positive for back pain and joint pain.   Skin: Negative for rash.   Neurological: Positive for weakness. Negative for dizziness and headaches.   Psychiatric/Behavioral: Negative for depression. The patient is nervous/anxious.    All other systems reviewed and are negative.       Physical Exam  Temp:  [36.2 °C (97.2 °F)-36.9 °C (98.5 °F)] 36.2 °C (97.2 °F)  Pulse:  [69-82] 77  Resp:  [18-20] 18  BP: ()/(49-62) 109/59  SpO2:  [90 %-95 %] 91 %    Physical Exam  Vitals and nursing note reviewed.   Constitutional:       General: She is not in acute distress.     Appearance: Normal appearance. She is obese.   HENT:      Head: Normocephalic and atraumatic.      Right Ear: External ear normal.      Left Ear: External ear normal.      Nose: Nose normal.      Mouth/Throat:      Mouth: Mucous membranes are moist.      Pharynx: Oropharynx is clear.   Eyes:      General: No scleral icterus.  Cardiovascular:      Rate and Rhythm: Normal rate and regular rhythm.      Pulses: Normal pulses.      Heart sounds: Normal heart sounds.   Pulmonary:      Effort: Pulmonary effort is normal. No respiratory distress.      Breath sounds: Normal breath sounds.   Abdominal:      General: Abdomen is flat. Bowel sounds are normal.      Palpations: Abdomen is soft.      Tenderness: There is no  abdominal tenderness.   Musculoskeletal:         General: No swelling. Normal range of motion.      Cervical back: Normal range of motion and neck supple.   Skin:     General: Skin is warm and dry.      Capillary Refill: Capillary refill takes less than 2 seconds.   Neurological:      Mental Status: She is alert and oriented to person, place, and time.   Psychiatric:         Mood and Affect: Mood normal.         Behavior: Behavior normal.         Fluids  No intake or output data in the 24 hours ending 05/28/22 0713    Laboratory  Recent Labs     05/26/22  1302 05/27/22  0013 05/28/22  0035   WBC 2.0* 2.2* 2.2*   RBC 2.27* 2.18* 2.14*   HEMOGLOBIN 7.8* 7.6* 7.6*   HEMATOCRIT 25.0* 24.1* 23.6*   .1* 110.6* 110.3*   MCH 34.4* 34.9* 35.5*   MCHC 31.2* 31.5* 32.2*   RDW 94.2* 95.4* 93.8*   PLATELETCT 94* 75* 79*   MPV 12.0 12.2 12.4     Recent Labs     05/26/22  1302 05/27/22  0013 05/28/22  0035   SODIUM 139 138 135   POTASSIUM 4.1 3.7 3.8   CHLORIDE 106 108 104   CO2 22 20 22   GLUCOSE 174* 82 105*   BUN 19 20 22   CREATININE 1.31 1.33 1.48*   CALCIUM 9.1 9.2 10.0                   Imaging  No orders to display        Assessment/Plan  * Type 2 diabetes mellitus with hyperglycemia, with long-term current use of insulin (HCC)  Assessment & Plan  She reports that her most recent hemoglobin A1c was 7.1  5/27: Blood sugar this am 61, repeat 107  -After extensive discussion with patient, left lantus dosing as is  -Added evening snack  5/28: Blood sugar this am 52   -Decreased lantus to 100 mg BID     -Reviewed with patient that due to to her AML and likely poor appetite with induction chemotherapy we might need to readjust her insulin on a daily basis depending on her p.o. intake and BG readings    Cluster headache  Assessment & Plan  Per patient, suffers with cluster headaches  Usually takes over the counter migraine medicine    Fioricet q6 prn headache      Acute kidney injury (HCC)  Assessment & Plan  Creatine  elevated to 1.48 this am  Discussed with pharmacy, likely side effect of Dacogen    -Started on gentle IV fluid hydration NS @83cc/hr  -Will alert Dr. Rubi as patient is scheduled for dose 4/5 today    Allergic rhinitis  Assessment & Plan  We will resume her home dose of nightly Benadryl as needed which she has tolerated well  We will add saline nasal spray and nasal Atrovent  Avoid nasal steroids given her thrombocytopenia and increased risk of epistaxis    Dyslipidemia  Assessment & Plan  Continue fenofibrate    Benign essential HTN  Assessment & Plan  Stable on losartan and hydrochlorothiazide  Monitor electrolytes with thiazide therapy and replete accordingly    Hypothyroid  Assessment & Plan  Continue levothyroxine    AML (acute myelogenous leukemia) (HCC)- (present on admission)  Assessment & Plan  Chemotherapy per oncology  Monitor cbc and transfuse as needed  Neutropenic precautions        VTE prophylaxis: SCDs/TEDs    I have performed a physical exam and reviewed and updated ROS and Plan today (5/28/2022). In review of yesterday's note (5/27/2022), there are no changes except as documented above.

## 2022-05-29 VITALS
OXYGEN SATURATION: 92 % | HEIGHT: 65 IN | TEMPERATURE: 97.7 F | RESPIRATION RATE: 18 BRPM | WEIGHT: 245.37 LBS | DIASTOLIC BLOOD PRESSURE: 65 MMHG | SYSTOLIC BLOOD PRESSURE: 116 MMHG | BODY MASS INDEX: 40.88 KG/M2 | HEART RATE: 74 BPM

## 2022-05-29 PROBLEM — N17.9 ACUTE KIDNEY INJURY (HCC): Status: RESOLVED | Noted: 2022-05-28 | Resolved: 2022-05-29

## 2022-05-29 PROBLEM — G44.009 CLUSTER HEADACHE: Status: RESOLVED | Noted: 2022-05-28 | Resolved: 2022-05-29

## 2022-05-29 LAB
ABO + RH BLD: NORMAL
ABO GROUP BLD: NORMAL
ALBUMIN SERPL BCP-MCNC: 3.6 G/DL (ref 3.2–4.9)
ALBUMIN/GLOB SERPL: 1.5 G/DL
ALP SERPL-CCNC: 60 U/L (ref 30–99)
ALT SERPL-CCNC: 10 U/L (ref 2–50)
ANION GAP SERPL CALC-SCNC: 8 MMOL/L (ref 7–16)
ANISOCYTOSIS BLD QL SMEAR: ABNORMAL
AST SERPL-CCNC: 17 U/L (ref 12–45)
BARCODED ABORH UBTYP: 6200
BARCODED PRD CODE UBPRD: NORMAL
BARCODED UNIT NUM UBUNT: NORMAL
BASOPHILS # BLD AUTO: 0.9 % (ref 0–1.8)
BASOPHILS # BLD: 0.01 K/UL (ref 0–0.12)
BILIRUB SERPL-MCNC: 0.9 MG/DL (ref 0.1–1.5)
BLD GP AB SCN SERPL QL: NORMAL
BUN SERPL-MCNC: 18 MG/DL (ref 8–22)
CALCIUM SERPL-MCNC: 9.3 MG/DL (ref 8.5–10.5)
CHLORIDE SERPL-SCNC: 107 MMOL/L (ref 96–112)
CO2 SERPL-SCNC: 23 MMOL/L (ref 20–33)
COMPONENT R 8504R: NORMAL
CREAT SERPL-MCNC: 1.26 MG/DL (ref 0.5–1.4)
EOSINOPHIL # BLD AUTO: 0 K/UL (ref 0–0.51)
EOSINOPHIL NFR BLD: 0 % (ref 0–6.9)
ERYTHROCYTE [DISTWIDTH] IN BLOOD BY AUTOMATED COUNT: 93.3 FL (ref 35.9–50)
GFR SERPLBLD CREATININE-BSD FMLA CKD-EPI: 47 ML/MIN/1.73 M 2
GLOBULIN SER CALC-MCNC: 2.4 G/DL (ref 1.9–3.5)
GLUCOSE BLD STRIP.AUTO-MCNC: 121 MG/DL (ref 65–99)
GLUCOSE BLD STRIP.AUTO-MCNC: 129 MG/DL (ref 65–99)
GLUCOSE BLD STRIP.AUTO-MCNC: 185 MG/DL (ref 65–99)
GLUCOSE SERPL-MCNC: 146 MG/DL (ref 65–99)
HCT VFR BLD AUTO: 22.2 % (ref 37–47)
HGB BLD-MCNC: 7 G/DL (ref 12–16)
LDH SERPL L TO P-CCNC: 260 U/L (ref 107–266)
LYMPHOCYTES # BLD AUTO: 0.94 K/UL (ref 1–4.8)
LYMPHOCYTES NFR BLD: 58.8 % (ref 22–41)
MACROCYTES BLD QL SMEAR: ABNORMAL
MANUAL DIFF BLD: NORMAL
MCH RBC QN AUTO: 34.3 PG (ref 27–33)
MCHC RBC AUTO-ENTMCNC: 31.5 G/DL (ref 33.6–35)
MCV RBC AUTO: 108.8 FL (ref 81.4–97.8)
MONOCYTES # BLD AUTO: 0 K/UL (ref 0–0.85)
MONOCYTES NFR BLD AUTO: 0 % (ref 0–13.4)
MORPHOLOGY BLD-IMP: NORMAL
NEUTROPHILS # BLD AUTO: 0.64 K/UL (ref 2–7.15)
NEUTROPHILS NFR BLD: 40.3 % (ref 44–72)
NRBC # BLD AUTO: 0.05 K/UL
NRBC BLD-RTO: 3.1 /100 WBC
OVALOCYTES BLD QL SMEAR: NORMAL
PLATELET # BLD AUTO: 72 K/UL (ref 164–446)
PLATELET BLD QL SMEAR: NORMAL
PMV BLD AUTO: 12.5 FL (ref 9–12.9)
POIKILOCYTOSIS BLD QL SMEAR: NORMAL
POTASSIUM SERPL-SCNC: 4.3 MMOL/L (ref 3.6–5.5)
PRODUCT TYPE UPROD: NORMAL
PROT SERPL-MCNC: 6 G/DL (ref 6–8.2)
RBC # BLD AUTO: 2.04 M/UL (ref 4.2–5.4)
RBC BLD AUTO: PRESENT
RH BLD: NORMAL
SODIUM SERPL-SCNC: 138 MMOL/L (ref 135–145)
UNIT STATUS USTAT: NORMAL
URATE SERPL-MCNC: 5 MG/DL (ref 1.9–8.2)
WBC # BLD AUTO: 1.6 K/UL (ref 4.8–10.8)

## 2022-05-29 PROCEDURE — 86850 RBC ANTIBODY SCREEN: CPT

## 2022-05-29 PROCEDURE — 86923 COMPATIBILITY TEST ELECTRIC: CPT

## 2022-05-29 PROCEDURE — 99239 HOSP IP/OBS DSCHRG MGMT >30: CPT | Performed by: NURSE PRACTITIONER

## 2022-05-29 PROCEDURE — 83615 LACTATE (LD) (LDH) ENZYME: CPT

## 2022-05-29 PROCEDURE — 86945 BLOOD PRODUCT/IRRADIATION: CPT

## 2022-05-29 PROCEDURE — 36430 TRANSFUSION BLD/BLD COMPNT: CPT

## 2022-05-29 PROCEDURE — 80053 COMPREHEN METABOLIC PANEL: CPT

## 2022-05-29 PROCEDURE — 82962 GLUCOSE BLOOD TEST: CPT

## 2022-05-29 PROCEDURE — A9270 NON-COVERED ITEM OR SERVICE: HCPCS | Performed by: NURSE PRACTITIONER

## 2022-05-29 PROCEDURE — 700105 HCHG RX REV CODE 258: Performed by: INTERNAL MEDICINE

## 2022-05-29 PROCEDURE — 30233N1 TRANSFUSION OF NONAUTOLOGOUS RED BLOOD CELLS INTO PERIPHERAL VEIN, PERCUTANEOUS APPROACH: ICD-10-PCS | Performed by: INTERNAL MEDICINE

## 2022-05-29 PROCEDURE — 700102 HCHG RX REV CODE 250 W/ 637 OVERRIDE(OP): Performed by: NURSE PRACTITIONER

## 2022-05-29 PROCEDURE — 36415 COLL VENOUS BLD VENIPUNCTURE: CPT

## 2022-05-29 PROCEDURE — 86900 BLOOD TYPING SEROLOGIC ABO: CPT

## 2022-05-29 PROCEDURE — 700102 HCHG RX REV CODE 250 W/ 637 OVERRIDE(OP): Performed by: INTERNAL MEDICINE

## 2022-05-29 PROCEDURE — 86901 BLOOD TYPING SEROLOGIC RH(D): CPT

## 2022-05-29 PROCEDURE — 85007 BL SMEAR W/DIFF WBC COUNT: CPT

## 2022-05-29 PROCEDURE — 700111 HCHG RX REV CODE 636 W/ 250 OVERRIDE (IP): Mod: JG | Performed by: INTERNAL MEDICINE

## 2022-05-29 PROCEDURE — 85025 COMPLETE CBC W/AUTO DIFF WBC: CPT

## 2022-05-29 PROCEDURE — A9270 NON-COVERED ITEM OR SERVICE: HCPCS | Performed by: INTERNAL MEDICINE

## 2022-05-29 PROCEDURE — 84550 ASSAY OF BLOOD/URIC ACID: CPT

## 2022-05-29 PROCEDURE — P9016 RBC LEUKOCYTES REDUCED: HCPCS

## 2022-05-29 PROCEDURE — 700111 HCHG RX REV CODE 636 W/ 250 OVERRIDE (IP): Performed by: HOSPITALIST

## 2022-05-29 RX ORDER — ACYCLOVIR 200 MG/1
400 CAPSULE ORAL 2 TIMES DAILY
Qty: 30 CAPSULE | Refills: 0 | Status: SHIPPED | OUTPATIENT
Start: 2022-05-29

## 2022-05-29 RX ORDER — SUMATRIPTAN 25 MG/1
25 TABLET, FILM COATED ORAL
Qty: 10 TABLET | Refills: 3 | Status: SHIPPED | OUTPATIENT
Start: 2022-05-29

## 2022-05-29 RX ORDER — INSULIN GLARGINE 100 [IU]/ML
120 INJECTION, SOLUTION SUBCUTANEOUS 2 TIMES DAILY
Qty: 10 ML | Refills: 0 | Status: SHIPPED | OUTPATIENT
Start: 2022-05-29

## 2022-05-29 RX ORDER — VORICONAZOLE 200 MG/1
200 TABLET, FILM COATED ORAL 2 TIMES DAILY
Qty: 1 TABLET | Refills: 0
Start: 2022-05-29

## 2022-05-29 RX ORDER — ONDANSETRON 4 MG/1
4 TABLET, ORALLY DISINTEGRATING ORAL EVERY 6 HOURS PRN
Qty: 60 TABLET | Refills: 0 | Status: SHIPPED | OUTPATIENT
Start: 2022-05-29

## 2022-05-29 RX ADMIN — DECITABINE 45.2 MG: 50 INJECTION, POWDER, LYOPHILIZED, FOR SOLUTION INTRAVENOUS at 13:19

## 2022-05-29 RX ADMIN — BUPROPION HYDROCHLORIDE 200 MG: 100 TABLET, FILM COATED, EXTENDED RELEASE ORAL at 06:32

## 2022-05-29 RX ADMIN — FENOFIBRATE 134 MG: 134 CAPSULE ORAL at 06:32

## 2022-05-29 RX ADMIN — ONDANSETRON 4 MG: 4 TABLET, ORALLY DISINTEGRATING ORAL at 07:22

## 2022-05-29 RX ADMIN — LORATADINE 10 MG: 10 TABLET ORAL at 06:33

## 2022-05-29 RX ADMIN — ACYCLOVIR 400 MG: 200 CAPSULE ORAL at 06:32

## 2022-05-29 RX ADMIN — VORICONAZOLE 200 MG: 200 TABLET ORAL at 05:05

## 2022-05-29 RX ADMIN — HYDROCHLOROTHIAZIDE 12.5 MG: 25 TABLET ORAL at 06:33

## 2022-05-29 RX ADMIN — LOSARTAN POTASSIUM 100 MG: 50 TABLET, FILM COATED ORAL at 06:32

## 2022-05-29 RX ADMIN — INSULIN LISPRO 2 UNITS: 100 INJECTION, SOLUTION INTRAVENOUS; SUBCUTANEOUS at 10:59

## 2022-05-29 RX ADMIN — VENETOCLAX 100 MG: 100 TABLET, FILM COATED ORAL at 16:50

## 2022-05-29 RX ADMIN — ZONISAMIDE 150 MG: 50 CAPSULE ORAL at 06:33

## 2022-05-29 RX ADMIN — ACETAMINOPHEN 650 MG: 325 TABLET ORAL at 15:20

## 2022-05-29 RX ADMIN — VORICONAZOLE 200 MG: 200 TABLET ORAL at 16:50

## 2022-05-29 RX ADMIN — DIPHENHYDRAMINE HYDROCHLORIDE 25 MG: 25 TABLET ORAL at 15:19

## 2022-05-29 RX ADMIN — ACYCLOVIR 400 MG: 200 CAPSULE ORAL at 16:50

## 2022-05-29 RX ADMIN — BUTALBITAL, ACETAMINOPHEN, AND CAFFEINE 1 TABLET: 50; 325; 40 TABLET ORAL at 10:55

## 2022-05-29 RX ADMIN — LEVOTHYROXINE SODIUM 175 MCG: 0.12 TABLET ORAL at 05:05

## 2022-05-29 ASSESSMENT — ENCOUNTER SYMPTOMS
CARDIOVASCULAR NEGATIVE: 1
PSYCHIATRIC NEGATIVE: 1
SPUTUM PRODUCTION: 0
SHORTNESS OF BREATH: 0
MUSCULOSKELETAL NEGATIVE: 1
VOMITING: 0
COUGH: 1
NEUROLOGICAL NEGATIVE: 1
EYES NEGATIVE: 1
NAUSEA: 0

## 2022-05-29 NOTE — PROGRESS NOTES
Chemotherapy Verification - SECONDARY RN       Height = 165.1cm  Weight = 111kg  BSA = 2.26m2       Medication: decitabine  Dose: 20mg/m2  Calculated Dose: 45.2mg                             (In mg/m2, AUC, mg/kg)         I confirm that this process was performed independently.

## 2022-05-29 NOTE — DISCHARGE INSTRUCTIONS
Discharge Instructions    Discharged to home by car with relative. Discharged via wheelchair, hospital escort: Yes.  Special equipment needed: Home walker.     Be sure to schedule a follow-up appointment with your primary care doctor or any specialists as instructed.     Discharge Plan:        I understand that a diet low in cholesterol, fat, and sodium is recommended for good health. Unless I have been given specific instructions below for another diet, I accept this instruction as my diet prescription.   Other diet: Zena, you were on a consistent carbohydrate diet (diabetic) while hospitalized.     Special Instructions: None    Is patient discharged on Warfarin / Coumadin?   No     Depression / Suicide Risk    As you are discharged from this [facility] facility, it is important to learn how to keep safe from harming yourself.    Recognize the warning signs:  Abrupt changes in personality, positive or negative- including increase in energy   Giving away possessions  Change in eating patterns- significant weight changes-  positive or negative  Change in sleeping patterns- unable to sleep or sleeping all the time   Unwillingness or inability to communicate  Depression  Unusual sadness, discouragement and loneliness  Talk of wanting to die  Neglect of personal appearance   Rebelliousness- reckless behavior  Withdrawal from people/activities they love  Confusion- inability to concentrate     If you or a loved one observes any of these behaviors or has concerns about self-harm, here's what you can do:  Talk about it- your feelings and reasons for harming yourself  Remove any means that you might use to hurt yourself (examples: pills, rope, extension cords, firearm)  Get professional help from the community (Mental Health, Substance Abuse, psychological counseling)  Do not be alone:Call your Safe Contact- someone whom you trust who will be there for you.  Call your local CRISIS HOTLINE 592-8548 or 661-398-8023  Call  your local Children's Mobile Crisis Response Team [place] (980) 082-6211 or www.[employer].Mobi  Call the toll free National Suicide Prevention Hotlines   National Suicide Prevention Lifeline 030-809-MAHG (3755)  National Hope Line Network 800-SUICIDE (861-6342)

## 2022-05-29 NOTE — DISCHARGE SUMMARY
Discharge Summary    CHIEF COMPLAINT ON ADMISSION  No chief complaint on file.      Reason for Admission  acute myeloid leukemia     Admission Date  5/25/2022    CODE STATUS  Full Code    HPI & HOSPITAL COURSE    66 y.o. female who presented 5/25/2022 with newly diagnosed AML admitted for induction chemotherapy.  The hospitalist service was consulted to assist in management of her blood glucose levels. Patient has a history of type 2 diabetes with insulin resistance has been maintained on high-dose Lantus of 180 units twice daily. She was recently diagnosed with AML and has been having fluctuating glucose levels.  Her PCP decreased her Lantus dose 120 twice daily , but she was having high readings, so she self increased her dose to 140 units twice daily alongside exenatide weekly injections.  She was previously on metformin when she was first diagnosed but did not tolerate.  She was noted to have a glucose of 98 on the morning of 5/26 and her Lantus was held.  She reports that she has been having decreased appetite with nausea and is eating about 50% of her meals.     Patient underwent a five day induction chemotherapy regimen with decitabine and venetoclax. Her oncologist is Dr. Treviño of [facility].  Regimen tolerated with associated nausea. She had a slight JASON on day 4/5 which resolved with IV fluids. She has already had her home chemotherapy delivered.     During her hospitalization, she had variable blood sugars and her long acting insulin regimen was adjusted accordingly. She also suffered from cluster headaches relieved by sumatriptan. Patient seen and examined on day of discharge. It was agreed that she will discharge on 120 mg BID of lantus and the patient knows to adjust the dose accordingly with her PO intake and blood sugar readings. Her sister is flying in from [place] to stay with her for one week. All required medications were sent to the patient's pharmacy in [place]. Plan of care discussed and all questions  were answered.     Therefore, she is discharged in fair and stable condition to home with close outpatient follow-up.    The patient met 2-midnight criteria for an inpatient stay at the time of discharge.    Discharge Date  5/29/2022    FOLLOW UP ITEMS POST DISCHARGE  Follow-up with Dr. Treviño as previously arranged.  Follow-up with your PCP as needed.    DISCHARGE DIAGNOSES  Principal Problem:    Type 2 diabetes mellitus with hyperglycemia, with long-term current use of insulin (HCC) POA: Unknown  Active Problems:    AML (acute myelogenous leukemia) (HCC) POA: Yes    Hypothyroid POA: Unknown    Benign essential HTN POA: Unknown    Dyslipidemia POA: Unknown    Allergic rhinitis POA: Unknown  Resolved Problems:    Acute kidney injury (HCC) POA: Unknown    Cluster headache POA: Unknown    FOLLOW UP  No future appointments.  Luis A Treviño M.D.  [address]  [facility] [address]  [phone]    Schedule an appointment as soon as possible for a visit in 2 week(s)      Raf Bradley M.D.  [address] [address] [phone]    Schedule an appointment as soon as possible for a visit  As needed      MEDICATIONS ON DISCHARGE     Medication List      ASK your doctor about these medications      Instructions   baclofen 10 MG Tabs  Commonly known as: LIORESAL   Take 10 mg by mouth 3 times a day. As needed  Dose: 10 mg     buPROPion 200 MG SR tablet  Commonly known as: WELLBUTRIN SR   Take 200 mg by mouth every day.  Dose: 200 mg     BYDUREON SC   Inject  under the skin every 7 days. Takes on Sundays     fenofibrate 145 MG Tabs  Commonly known as: TRICOR   Take 145 mg by mouth every day.  Dose: 145 mg     gabapentin 100 MG Caps  Commonly known as: NEURONTIN   Take 100 mg by mouth 2 times a day. prn  Dose: 100 mg     LANTUS SC   Inject 140 Units under the skin 2 times a day.  Dose: 140 Units     levothyroxine 175 MCG Tabs  Commonly known as: SYNTHROID   Take 175 mcg by mouth every  morning on an empty stomach.  Dose: 175 mcg     losartan-hydrochlorothiazide 100-12.5 MG per tablet  Commonly known as: HYZAAR   Take 1 Tablet by mouth every day.  Dose: 1 Tablet     zonisamide 100 MG Caps  Commonly known as: ZONEGRAN   Take 150 mg by mouth every day.  Dose: 150 mg            Allergies  Allergies   Allergen Reactions   • Clindamycin Rash and Swelling     Family history   • Pcn [Penicillins]      Family history       DIET  Orders Placed This Encounter   Procedures   • Diet Order Diet: Consistent CHO (Diabetic)     Standing Status:   Standing     Number of Occurrences:   1     Order Specific Question:   Diet:     Answer:   Consistent CHO (Diabetic) [4]       ACTIVITY  As tolerated.  Weight bearing as tolerated    CONSULTATIONS  Oncology    PROCEDURES  None    LABORATORY  Lab Results   Component Value Date    SODIUM 138 05/29/2022    POTASSIUM 4.3 05/29/2022    CHLORIDE 107 05/29/2022    CO2 23 05/29/2022    GLUCOSE 146 (H) 05/29/2022    BUN 18 05/29/2022    CREATININE 1.26 05/29/2022        Lab Results   Component Value Date    WBC 1.6 (LL) 05/29/2022    HEMOGLOBIN 7.0 (L) 05/29/2022    HEMATOCRIT 22.2 (L) 05/29/2022    PLATELETCT 72 (L) 05/29/2022        Total time of the discharge process exceeds 38 minutes.

## 2022-05-29 NOTE — PROGRESS NOTES
Chemotherapy Verification - PRIMARY RN  Cycle 1, Day 5      Height = 165.1 cm  Weight = 111 kg  BSA = 2.26m2       Medication: decitabine (Dacogen)  Dose: 2.26 m2  Calculated Dose: 45.2 mg, Dose ordered 45.2 mg.                              (In mg/m2, AUC, mg/kg)       I confirm this process was performed independently with the BSA and all final chemotherapy dosing calculations congruent.  Any discrepancies of 10% or greater have been addressed with the chemotherapy pharmacist. The resolution of the discrepancy has been documented in the EPIC progress notes.

## 2022-05-29 NOTE — PROGRESS NOTES
"Pharmacy Chemotherapy Calculations    Patient Name: Zena Myers     Diagnosis: AML     Regimen: Induction       Modified Venetoclax dosing schedule 2/2 concomitant Voriconazole:   · 10 mg on Day 1   · 20 mg on Day 2   · 50 mg on Day 3   · 100 mg on Days 4 - 28       Dosing References: [id number]       Prophylaxis:  · Acyclovir 400 mg PO BID   · Voriconazole 200 mg PO BID   · Allopurinol not indicated at this time - Low risk     Allergies: Clindamycin and Pcn [penicillins]       /59   Pulse 68   Temp 36.2 °C (97.1 °F) (Temporal)   Resp 16   Ht 1.651 m (5' 5\")   Wt 111 kg (245 lb 6 oz)   LMP 04/15/2005   SpO2 94%   BMI 40.83 kg/m²  Body surface area is 2.26 meters squared.     No lab treatment HOLD parameters for day 5. Transfusion orders in place.      Drug Order   (Drug name, dose, route, IV Fluid & volume, frequency, number of doses) Cycle 1, Day 5 of 5       Previous treatment: N/A      Medication = Decitabine (DACOGEN)   Base Dose = 20 mg/m2   Calc Dose: Base Dose x 2.26 m2 = 45.2 mg  Final Dose = 45.2 mg  Route = IV  Fluid & Volume =  mL  Admin Duration = Over 60 minutes           <10% difference, okay to treat with final dose     By my signature below, I confirm this process was performed independently with the BSA and all final chemotherapy dosing calculations congruent. I have reviewed the above chemotherapy order and that my calculation of the final dose and BSA (when applicable) corroborate those calculations of the  pharmacist. Discrepancies of 10% or greater in the written dose have been addressed and documented within the [facility] Progress notes.      Stepan Herrera, PharmD  "

## 2022-05-29 NOTE — PROGRESS NOTES
Oncology/Hematology Progress Note               Author: Tigist Mccoy M.D. Date & Time created: 5/29/2022  12:57 PM     CC: AML, admitted for induction chemotherapy with decitabine and venetoclax    Interval History:  No acute events overnight. Her headache is essentially resolved. Her cough is about the same. She feels ready to go home after chemotherapy today.     Review of Systems:  Review of Systems   Constitutional: Positive for malaise/fatigue.   HENT: Negative.    Eyes: Negative.    Respiratory: Positive for cough. Negative for sputum production and shortness of breath.    Cardiovascular: Negative.    Gastrointestinal: Negative for nausea and vomiting.   Genitourinary: Negative.    Musculoskeletal: Negative.    Skin: Negative.    Neurological: Negative.    Endo/Heme/Allergies: Negative.    Psychiatric/Behavioral: Negative.        Physical Exam:  Physical Exam  Constitutional:       General: She is not in acute distress.     Appearance: She is normal weight. She is not toxic-appearing.   HENT:      Head: Normocephalic and atraumatic.      Right Ear: External ear normal.      Left Ear: External ear normal.      Nose: Nose normal.      Mouth/Throat:      Mouth: Mucous membranes are moist.      Pharynx: Oropharynx is clear. No oropharyngeal exudate.   Eyes:      General: No scleral icterus.     Conjunctiva/sclera: Conjunctivae normal.   Cardiovascular:      Rate and Rhythm: Normal rate and regular rhythm.      Heart sounds: No murmur heard.    No gallop.   Pulmonary:      Effort: Pulmonary effort is normal. No respiratory distress.      Breath sounds: Normal breath sounds. No wheezing or rales.   Abdominal:      General: Abdomen is flat. Bowel sounds are normal. There is no distension.      Palpations: Abdomen is soft.      Tenderness: There is no abdominal tenderness.   Musculoskeletal:         General: No swelling or tenderness.      Cervical back: Neck supple. No tenderness.   Skin:     General: Skin is  warm and dry.   Neurological:      General: No focal deficit present.      Mental Status: She is alert and oriented to person, place, and time.   Psychiatric:         Mood and Affect: Mood normal.         Behavior: Behavior normal.         Thought Content: Thought content normal.         Judgment: Judgment normal.         Labs:          Recent Labs     225 22  0401   SODIUM 138 135 138   POTASSIUM 3.7 3.8 4.3   CHLORIDE 108 104 107   CO2  23   BUN  18   CREATININE 1.33 1.48* 1.26   MAGNESIUM  --  2.1  --    PHOSPHORUS  --  4.7*  --    CALCIUM 9.2 10.0 9.3     Recent Labs     22  0035 22  0401   ALTSGPT 11 14 10   ASTSGOT  17   ALKPHOSPHAT 61 64 60   TBILIRUBIN 0.8 1.0 0.9   GLUCOSE 82 105* 146*     Recent Labs     225 22  0401   RBC 2.18* 2.14* 2.04*   HEMOGLOBIN 7.6* 7.6* 7.0*   HEMATOCRIT 24.1* 23.6* 22.2*   PLATELETCT 75* 79* 72*     Recent Labs     225 22  0401   WBC 2.2* 2.2* 1.6*   NEUTSPOLYS 28.80* 68.70 40.30*   LYMPHOCYTES 66.70* 31.30 58.80*   MONOCYTES 0.90 0.00 0.00   EOSINOPHILS 0.00 0.00 0.00   BASOPHILS 0.90 0.00 0.90   ASTSGOT  17   ALTSGPT 11 14 10   ALKPHOSPHAT 61 64 60   TBILIRUBIN 0.8 1.0 0.9     Recent Labs     22  0013 05/28/22  0035 22  0401   SODIUM 138 135 138   POTASSIUM 3.7 3.8 4.3   CHLORIDE 108 104 107   CO2  23   GLUCOSE 82 105* 146*   BUN  18   CREATININE 1.33 1.48* 1.26   CALCIUM 9.2 10.0 9.3     Hemodynamics:  Temp (24hrs), Av.3 °C (97.3 °F), Min:36.1 °C (96.9 °F), Max:36.6 °C (97.9 °F)  Temperature: 36.3 °C (97.4 °F)  Pulse  Av.4  Min: 67  Max: 89   Blood Pressure : 117/73     Respiratory:    Respiration: 17, Pulse Oximetry: 92 %     Work Of Breathing / Effort: Within Normal Limits  RUL Breath Sounds: Clear, RML Breath Sounds: Clear, RLL Breath Sounds: Diminished, TICO Breath Sounds: Clear, LLL Breath Sounds:  Diminished  Fluids:    Intake/Output Summary (Last 24 hours) at 5/26/2022 1026  Last data filed at 5/25/2022 1650  Gross per 24 hour   Intake 100 ml   Output --   Net 100 ml        GI/Nutrition:  Orders Placed This Encounter   Procedures   • Diet Order Diet: Consistent CHO (Diabetic)     Standing Status:   Standing     Number of Occurrences:   1     Order Specific Question:   Diet:     Answer:   Consistent CHO (Diabetic) [4]     Medical Decision Making, by Problem:  Active Hospital Problems    Diagnosis    • *Type 2 diabetes mellitus with hyperglycemia, with long-term current use of insulin (HCC) [E11.65, Z79.4]    • Hypothyroid [E03.9]    • Benign essential HTN [I10]    • Dyslipidemia [E78.5]    • Allergic rhinitis [J30.9]    • AML (acute myelogenous leukemia) (HCC) [C92.00]        Assessment and Plan:    # AML with myelodysplastic changes - patient presented with pancytopenia.  Bone marrow biopsy from 4/19/2022 shows a hypercellular marrow with 20% myeloblasts.  Cytogenetics were normal, FISH negative, negative for FLT3, IDH1/2, NPMN1, and TP53 mutations. Positive for single CEBPA mutation.  - Admitted yesterday by Dr. Treviño to start decitabine and venetoclax.  Low intensity regimen was selected secondary to her age and other underlying medical comorbidity  - Decitabine 20 mg/m2 IV daily x 5 days, completes this therapy today  - Venetoclax - ramp up to 100 mg daily due to vori interaction. Pt reports she has home medication for discharge.  - Monitor labs - no evidence of TLS    # Pancytopenia 2/2 chemotherapy  - transfuse to keep hemoglobin > 7, platelets > 10, standing orders placed  - transfusing one unit of blood today, then pt will have weekly CBCs starting on Wednesday.     # Type 2 DM  - pt self reports taking 140 U lantus BID  - hospital medicine has been consulted, they reduce her insulin to 120 BID, defer management to hospital team    # Hypertension  - resume home meds    # Hyothyroidism  - resume home  levothyroxine    # Cough, nonproductive  - present for months, pt reports allergies, reports normal CXR in the past. Monitor repeat imaging with worsening symptoms.    # Headache  - starting Fioricet    # Creatinine  - slight increase today at 1.26, re-started fluids.    # PPX  - Voriconazole  - Acyclovir    Follow up with Dr. Treviño in 1 week post discharge. CBCs weekly minimum. I sent an order to [facility].         Quality-Core Measures   Reviewed items::  Labs reviewed and Medications reviewed  Tong catheter::  No Tong  DVT prophylaxis pharmacological::  Not indicated at this time, ambulatory and Contraindicated - High bleeding risk

## 2022-05-30 NOTE — CARE PLAN
The patient is Stable - Low risk of patient condition declining or worsening    Shift Goals  Clinical Goals: safety  Patient Goals: discharge  Family Goals: n/a    Progress made toward(s) clinical / shift goals:    Problem: Knowledge Deficit - Standard  Goal: Patient and family/care givers will demonstrate understanding of plan of care, disease process/condition, diagnostic tests and medications  Outcome: Met     Problem: Fall Risk  Goal: Patient will remain free from falls  Outcome: Met     Problem: Psychosocial  Goal: Patient's level of anxiety will decrease  Outcome: Met  Goal: Patient's ability to verbalize feelings about condition will improve  Outcome: Met  Goal: Patient's ability to re-evaluate and adapt role responsibilities will improve  Outcome: Met  Goal: Patient and family will demonstrate ability to cope with life altering diagnosis and/or procedure  Outcome: Met  Goal: Spiritual and cultural needs incorporated into hospitalization  Outcome: Met     Problem: Communication  Goal: The ability to communicate needs accurately and effectively will improve  Outcome: Met     Problem: Hemodynamics  Goal: Patient's hemodynamics, fluid balance and neurologic status will be stable or improve  Outcome: Met     Problem: Respiratory  Goal: Patient will achieve/maintain optimum respiratory ventilation and gas exchange  Outcome: Met     Problem: Nutrition  Goal: Patient's nutritional and fluid intake will be adequate or improve  Outcome: Met  Goal: Enteral nutrition will be maintained or improve  Outcome: Met  Goal: Enteral nutrition will be maintained or improve  Outcome: Met     Problem: Urinary Elimination  Goal: Establish and maintain regular urinary output  Outcome: Met     Problem: Mobility  Goal: Patient's capacity to carry out activities will improve  Outcome: Met     Problem: Self Care  Goal: Patient will have the ability to perform ADLs independently or with assistance (bathe, groom, dress, toilet and  feed)  Outcome: Met     Problem: Pain - Standard  Goal: Alleviation of pain or a reduction in pain to the patient’s comfort goal  Outcome: Met       Patient is not progressing towards the following goals:

## 2022-05-30 NOTE — PROGRESS NOTES
Pt received cycle 1 day 5 of chemo, got a unit of PRBCs and was discharged.  VSS.   Chemo printouts given on drugs pt receiving ([facility]). Pt to follow up with [facility], and her PCP.  Reviewed discharge instructions, signed copy in chart, copy with pt. All questions answered. Patient's sister here to take pt home.   PIV removed. Pt and all belongings wheeled off unit.

## 2022-07-15 ENCOUNTER — HOSPITAL ENCOUNTER (OUTPATIENT)
Facility: MEDICAL CENTER | Age: 67
End: 2022-07-15
Attending: INTERNAL MEDICINE | Admitting: INTERNAL MEDICINE
Payer: MEDICARE

## 2022-07-19 ENCOUNTER — PRE-ADMISSION TESTING (OUTPATIENT)
Dept: ADMISSIONS | Facility: MEDICAL CENTER | Age: 67
End: 2022-07-19
Attending: INTERNAL MEDICINE
Payer: MEDICARE

## 2022-07-19 RX ORDER — BENZONATATE 100 MG/1
CAPSULE ORAL
COMMUNITY
Start: 2022-06-17 | End: 2022-07-21

## 2022-07-19 RX ORDER — PEN NEEDLE, DIABETIC 31 GX5/16"
NEEDLE, DISPOSABLE MISCELLANEOUS
COMMUNITY
Start: 2022-04-26 | End: 2022-07-21

## 2022-07-19 RX ORDER — ZONISAMIDE 50 MG/1
50 CAPSULE ORAL DAILY
COMMUNITY
Start: 2022-04-26

## 2022-07-19 RX ORDER — LORATADINE 10 MG/1
10 TABLET ORAL
COMMUNITY
Start: 2022-07-05 | End: 2022-07-21

## 2022-07-19 RX ORDER — HYDROCODONE BITARTRATE AND ACETAMINOPHEN 5; 325 MG/1; MG/1
TABLET ORAL
COMMUNITY
Start: 2022-06-23 | End: 2022-07-21

## 2022-07-19 RX ORDER — LIDOCAINE AND PRILOCAINE 25; 25 MG/G; MG/G
CREAM TOPICAL
COMMUNITY
Start: 2022-06-24 | End: 2022-07-21

## 2022-07-19 RX ORDER — PROCHLORPERAZINE MALEATE 10 MG
10 TABLET ORAL EVERY 6 HOURS PRN
COMMUNITY
Start: 2022-07-06

## 2022-07-21 ENCOUNTER — APPOINTMENT (OUTPATIENT)
Dept: RADIOLOGY | Facility: MEDICAL CENTER | Age: 67
DRG: 809 | End: 2022-07-21
Attending: EMERGENCY MEDICINE
Payer: MEDICARE

## 2022-07-21 ENCOUNTER — HOSPITAL ENCOUNTER (INPATIENT)
Facility: MEDICAL CENTER | Age: 67
LOS: 3 days | DRG: 809 | End: 2022-07-24
Attending: EMERGENCY MEDICINE | Admitting: STUDENT IN AN ORGANIZED HEALTH CARE EDUCATION/TRAINING PROGRAM
Payer: MEDICARE

## 2022-07-21 DIAGNOSIS — D64.9 ANEMIA, UNSPECIFIED TYPE: ICD-10-CM

## 2022-07-21 DIAGNOSIS — D70.9 NEUTROPENIC FEVER (HCC): ICD-10-CM

## 2022-07-21 DIAGNOSIS — R50.81 NEUTROPENIC FEVER (HCC): ICD-10-CM

## 2022-07-21 DIAGNOSIS — C92.00 ACUTE MYELOID LEUKEMIA NOT HAVING ACHIEVED REMISSION (HCC): ICD-10-CM

## 2022-07-21 LAB
ABO GROUP BLD: NORMAL
ALBUMIN SERPL BCP-MCNC: 3.8 G/DL (ref 3.2–4.9)
ALBUMIN/GLOB SERPL: 1.2 G/DL
ALP SERPL-CCNC: 107 U/L (ref 30–99)
ALT SERPL-CCNC: 27 U/L (ref 2–50)
ANION GAP SERPL CALC-SCNC: 12 MMOL/L (ref 7–16)
ANISOCYTOSIS BLD QL SMEAR: ABNORMAL
APPEARANCE UR: ABNORMAL
AST SERPL-CCNC: 28 U/L (ref 12–45)
BACTERIA #/AREA URNS HPF: NEGATIVE /HPF
BARCODED ABORH UBTYP: 600
BARCODED ABORH UBTYP: 6200
BARCODED PRD CODE UBPRD: NORMAL
BARCODED PRD CODE UBPRD: NORMAL
BARCODED UNIT NUM UBUNT: NORMAL
BARCODED UNIT NUM UBUNT: NORMAL
BASOPHILS # BLD AUTO: 0 % (ref 0–1.8)
BASOPHILS # BLD: 0 K/UL (ref 0–0.12)
BILIRUB SERPL-MCNC: 0.5 MG/DL (ref 0.1–1.5)
BILIRUB UR QL STRIP.AUTO: NEGATIVE
BLD GP AB SCN SERPL QL: NORMAL
BUN SERPL-MCNC: 17 MG/DL (ref 8–22)
CALCIUM SERPL-MCNC: 9.4 MG/DL (ref 8.5–10.5)
CHLORIDE SERPL-SCNC: 101 MMOL/L (ref 96–112)
CO2 SERPL-SCNC: 19 MMOL/L (ref 20–33)
COLOR UR: ABNORMAL
COMPONENT R 8504R: NORMAL
COMPONENT R 8504R: NORMAL
CREAT SERPL-MCNC: 1.08 MG/DL (ref 0.5–1.4)
CRP SERPL HS-MCNC: 14.8 MG/DL (ref 0–0.75)
EOSINOPHIL # BLD AUTO: 0 K/UL (ref 0–0.51)
EOSINOPHIL NFR BLD: 0 % (ref 0–6.9)
EPI CELLS #/AREA URNS HPF: NEGATIVE /HPF
ERYTHROCYTE [DISTWIDTH] IN BLOOD BY AUTOMATED COUNT: 43.4 FL (ref 35.9–50)
FLUAV RNA SPEC QL NAA+PROBE: NEGATIVE
FLUBV RNA SPEC QL NAA+PROBE: NEGATIVE
GFR SERPLBLD CREATININE-BSD FMLA CKD-EPI: 56 ML/MIN/1.73 M 2
GLOBULIN SER CALC-MCNC: 3.2 G/DL (ref 1.9–3.5)
GLUCOSE SERPL-MCNC: 188 MG/DL (ref 65–99)
GLUCOSE UR STRIP.AUTO-MCNC: NEGATIVE MG/DL
HCT VFR BLD AUTO: 9.8 % (ref 37–47)
HGB BLD-MCNC: 3.4 G/DL (ref 12–16)
KETONES UR STRIP.AUTO-MCNC: ABNORMAL MG/DL
LACTATE SERPL-SCNC: 1.1 MMOL/L (ref 0.5–2)
LEUKOCYTE ESTERASE UR QL STRIP.AUTO: NEGATIVE
LIPASE SERPL-CCNC: 14 U/L (ref 11–82)
LYMPHOCYTES # BLD AUTO: 0.76 K/UL (ref 1–4.8)
LYMPHOCYTES NFR BLD: 95 % (ref 22–41)
MACROCYTES BLD QL SMEAR: ABNORMAL
MAGNESIUM SERPL-MCNC: 2.1 MG/DL (ref 1.5–2.5)
MANUAL DIFF BLD: NORMAL
MCH RBC QN AUTO: 30.1 PG (ref 27–33)
MCHC RBC AUTO-ENTMCNC: 34.7 G/DL (ref 33.6–35)
MCV RBC AUTO: 86.7 FL (ref 81.4–97.8)
MICRO URNS: ABNORMAL
MICROCYTES BLD QL SMEAR: ABNORMAL
MONOCYTES # BLD AUTO: 0.03 K/UL (ref 0–0.85)
MONOCYTES NFR BLD AUTO: 4 % (ref 0–13.4)
MORPHOLOGY BLD-IMP: NORMAL
NEUTROPHILS # BLD AUTO: 0.01 K/UL (ref 2–7.15)
NEUTROPHILS NFR BLD: 1 % (ref 44–72)
NITRITE UR QL STRIP.AUTO: NEGATIVE
NRBC # BLD AUTO: 0 K/UL
NRBC BLD-RTO: 0 /100 WBC
OTHER ELEMENTS URNS MICRO: NORMAL
OVALOCYTES BLD QL SMEAR: NORMAL
PH UR STRIP.AUTO: 5.5 [PH] (ref 5–8)
PLATELET # BLD AUTO: 70 K/UL (ref 164–446)
PLATELET BLD QL SMEAR: NORMAL
PMV BLD AUTO: 10.1 FL (ref 9–12.9)
POIKILOCYTOSIS BLD QL SMEAR: NORMAL
POTASSIUM SERPL-SCNC: 3.8 MMOL/L (ref 3.6–5.5)
PRODUCT TYPE UPROD: NORMAL
PRODUCT TYPE UPROD: NORMAL
PROT SERPL-MCNC: 7 G/DL (ref 6–8.2)
PROT UR QL STRIP: NEGATIVE MG/DL
RBC # BLD AUTO: 1.13 M/UL (ref 4.2–5.4)
RBC # URNS HPF: NORMAL /HPF
RBC BLD AUTO: PRESENT
RBC UR QL AUTO: NEGATIVE
RENAL EPI CELLS #/AREA URNS HPF: NEGATIVE /HPF
RH BLD: NORMAL
RSV RNA SPEC QL NAA+PROBE: NEGATIVE
SARS-COV-2 RNA RESP QL NAA+PROBE: NOTDETECTED
SODIUM SERPL-SCNC: 132 MMOL/L (ref 135–145)
SP GR UR STRIP.AUTO: 1.02
SPECIMEN SOURCE: NORMAL
TROPONIN T SERPL-MCNC: 12 NG/L (ref 6–19)
UNIT STATUS USTAT: NORMAL
UNIT STATUS USTAT: NORMAL
UROBILINOGEN UR STRIP.AUTO-MCNC: 1 MG/DL
WBC # BLD AUTO: 0.8 K/UL (ref 4.8–10.8)
WBC #/AREA URNS HPF: NORMAL /HPF

## 2022-07-21 PROCEDURE — 700111 HCHG RX REV CODE 636 W/ 250 OVERRIDE (IP): Performed by: EMERGENCY MEDICINE

## 2022-07-21 PROCEDURE — 76705 ECHO EXAM OF ABDOMEN: CPT

## 2022-07-21 PROCEDURE — 96375 TX/PRO/DX INJ NEW DRUG ADDON: CPT

## 2022-07-21 PROCEDURE — 81001 URINALYSIS AUTO W/SCOPE: CPT

## 2022-07-21 PROCEDURE — 86140 C-REACTIVE PROTEIN: CPT

## 2022-07-21 PROCEDURE — 85007 BL SMEAR W/DIFF WBC COUNT: CPT

## 2022-07-21 PROCEDURE — 83735 ASSAY OF MAGNESIUM: CPT

## 2022-07-21 PROCEDURE — 87040 BLOOD CULTURE FOR BACTERIA: CPT

## 2022-07-21 PROCEDURE — 87086 URINE CULTURE/COLONY COUNT: CPT

## 2022-07-21 PROCEDURE — 71045 X-RAY EXAM CHEST 1 VIEW: CPT

## 2022-07-21 PROCEDURE — 86945 BLOOD PRODUCT/IRRADIATION: CPT

## 2022-07-21 PROCEDURE — 83605 ASSAY OF LACTIC ACID: CPT

## 2022-07-21 PROCEDURE — 84484 ASSAY OF TROPONIN QUANT: CPT

## 2022-07-21 PROCEDURE — 36430 TRANSFUSION BLD/BLD COMPNT: CPT

## 2022-07-21 PROCEDURE — 96374 THER/PROPH/DIAG INJ IV PUSH: CPT

## 2022-07-21 PROCEDURE — 85025 COMPLETE CBC W/AUTO DIFF WBC: CPT

## 2022-07-21 PROCEDURE — 99285 EMERGENCY DEPT VISIT HI MDM: CPT

## 2022-07-21 PROCEDURE — 0241U HCHG SARS-COV-2 COVID-19 NFCT DS RESP RNA 4 TRGT MIC: CPT

## 2022-07-21 PROCEDURE — 86923 COMPATIBILITY TEST ELECTRIC: CPT

## 2022-07-21 PROCEDURE — C9803 HOPD COVID-19 SPEC COLLECT: HCPCS | Performed by: EMERGENCY MEDICINE

## 2022-07-21 PROCEDURE — 86901 BLOOD TYPING SEROLOGIC RH(D): CPT

## 2022-07-21 PROCEDURE — 30233N1 TRANSFUSION OF NONAUTOLOGOUS RED BLOOD CELLS INTO PERIPHERAL VEIN, PERCUTANEOUS APPROACH: ICD-10-PCS | Performed by: EMERGENCY MEDICINE

## 2022-07-21 PROCEDURE — 770004 HCHG ROOM/CARE - ONCOLOGY PRIVATE *

## 2022-07-21 PROCEDURE — 36415 COLL VENOUS BLD VENIPUNCTURE: CPT

## 2022-07-21 PROCEDURE — 99223 1ST HOSP IP/OBS HIGH 75: CPT | Mod: AI,GC | Performed by: STUDENT IN AN ORGANIZED HEALTH CARE EDUCATION/TRAINING PROGRAM

## 2022-07-21 PROCEDURE — 80053 COMPREHEN METABOLIC PANEL: CPT

## 2022-07-21 PROCEDURE — 86850 RBC ANTIBODY SCREEN: CPT

## 2022-07-21 PROCEDURE — 83690 ASSAY OF LIPASE: CPT

## 2022-07-21 PROCEDURE — 74176 CT ABD & PELVIS W/O CONTRAST: CPT | Mod: MG

## 2022-07-21 PROCEDURE — 86900 BLOOD TYPING SEROLOGIC ABO: CPT

## 2022-07-21 PROCEDURE — P9016 RBC LEUKOCYTES REDUCED: HCPCS

## 2022-07-21 RX ORDER — 0.9 % SODIUM CHLORIDE 0.9 %
3 VIAL (ML) INJECTION PRN
Status: CANCELLED | OUTPATIENT
Start: 2022-07-21

## 2022-07-21 RX ORDER — DIPHENHYDRAMINE HYDROCHLORIDE 50 MG/ML
25 INJECTION INTRAMUSCULAR; INTRAVENOUS PRN
Status: CANCELLED | OUTPATIENT
Start: 2022-07-21

## 2022-07-21 RX ORDER — ACETAMINOPHEN 325 MG/1
650 TABLET ORAL EVERY 4 HOURS PRN
COMMUNITY

## 2022-07-21 RX ORDER — HEPARIN SODIUM (PORCINE) LOCK FLUSH IV SOLN 100 UNIT/ML 100 UNIT/ML
500 SOLUTION INTRAVENOUS PRN
Status: CANCELLED | OUTPATIENT
Start: 2022-07-21

## 2022-07-21 RX ORDER — PROCHLORPERAZINE EDISYLATE 5 MG/ML
10 INJECTION INTRAMUSCULAR; INTRAVENOUS ONCE
Status: COMPLETED | OUTPATIENT
Start: 2022-07-21 | End: 2022-07-21

## 2022-07-21 RX ORDER — ACETAMINOPHEN 325 MG/1
650 TABLET ORAL PRN
Status: CANCELLED | OUTPATIENT
Start: 2022-07-21

## 2022-07-21 RX ORDER — 0.9 % SODIUM CHLORIDE 0.9 %
VIAL (ML) INJECTION PRN
Status: CANCELLED | OUTPATIENT
Start: 2022-07-21

## 2022-07-21 RX ORDER — DIPHENHYDRAMINE HYDROCHLORIDE 50 MG/ML
12.5 INJECTION INTRAMUSCULAR; INTRAVENOUS ONCE
Status: COMPLETED | OUTPATIENT
Start: 2022-07-21 | End: 2022-07-21

## 2022-07-21 RX ORDER — SODIUM CHLORIDE 9 MG/ML
INJECTION, SOLUTION INTRAVENOUS CONTINUOUS
Status: CANCELLED | OUTPATIENT
Start: 2022-07-21

## 2022-07-21 RX ORDER — 0.9 % SODIUM CHLORIDE 0.9 %
10 VIAL (ML) INJECTION PRN
Status: CANCELLED | OUTPATIENT
Start: 2022-07-21

## 2022-07-21 RX ADMIN — DIPHENHYDRAMINE HYDROCHLORIDE 12.5 MG: 50 INJECTION INTRAMUSCULAR; INTRAVENOUS at 20:51

## 2022-07-21 RX ADMIN — CEFEPIME 2 G: 2 INJECTION, POWDER, FOR SOLUTION INTRAVENOUS at 20:51

## 2022-07-21 RX ADMIN — PROCHLORPERAZINE EDISYLATE 10 MG: 5 INJECTION INTRAMUSCULAR; INTRAVENOUS at 20:51

## 2022-07-21 ASSESSMENT — LIFESTYLE VARIABLES
CONSUMPTION TOTAL: NEGATIVE
EVER FELT BAD OR GUILTY ABOUT YOUR DRINKING: NO
HAVE PEOPLE ANNOYED YOU BY CRITICIZING YOUR DRINKING: NO
AVERAGE NUMBER OF DAYS PER WEEK YOU HAVE A DRINK CONTAINING ALCOHOL: 0
EVER HAD A DRINK FIRST THING IN THE MORNING TO STEADY YOUR NERVES TO GET RID OF A HANGOVER: NO
HOW MANY TIMES IN THE PAST YEAR HAVE YOU HAD 5 OR MORE DRINKS IN A DAY: 0
TOTAL SCORE: 0
ON A TYPICAL DAY WHEN YOU DRINK ALCOHOL HOW MANY DRINKS DO YOU HAVE: 0
DO YOU DRINK ALCOHOL: NO
TOTAL SCORE: 0
TOTAL SCORE: 0
HAVE YOU EVER FELT YOU SHOULD CUT DOWN ON YOUR DRINKING: NO

## 2022-07-21 ASSESSMENT — FIBROSIS 4 INDEX: FIB4 SCORE: 5

## 2022-07-21 NOTE — ED TRIAGE NOTES
Zena Krause Louis  66 y.o. female  Chief Complaint   Patient presents with   • Sent by MD     Sent by oncologist Dr. Treviño for further workup and admit. Has bone marrow biposy scheduled tomorrow. Hx leukemia. On oral chemo, starting infusions monday   • Fever     X1 week. Seen in [place] ED yesterday, unable to find source   • Nausea   • Malaise     Decreased PO intake the last few days     Pt WC to triage for above.    Pt afebrile on arrival. Pt notes port was more painful than normal when it was accessed yesterday.     Pt placed in family room. Pt encouraged to notify staff of any change in condition.

## 2022-07-22 ENCOUNTER — APPOINTMENT (OUTPATIENT)
Dept: RADIOLOGY | Facility: MEDICAL CENTER | Age: 67
DRG: 809 | End: 2022-07-22
Attending: STUDENT IN AN ORGANIZED HEALTH CARE EDUCATION/TRAINING PROGRAM
Payer: MEDICARE

## 2022-07-22 PROBLEM — D61.818 PANCYTOPENIA (HCC): Status: ACTIVE | Noted: 2022-07-22

## 2022-07-22 LAB
ALBUMIN SERPL BCP-MCNC: 3.1 G/DL (ref 3.2–4.9)
ALBUMIN/GLOB SERPL: 1 G/DL
ALP SERPL-CCNC: 94 U/L (ref 30–99)
ALT SERPL-CCNC: 22 U/L (ref 2–50)
ANION GAP SERPL CALC-SCNC: 11 MMOL/L (ref 7–16)
ANISOCYTOSIS BLD QL SMEAR: ABNORMAL
AST SERPL-CCNC: 17 U/L (ref 12–45)
BASOPHILS # BLD AUTO: 0 % (ref 0–1.8)
BASOPHILS # BLD: 0 K/UL (ref 0–0.12)
BILIRUB SERPL-MCNC: 0.7 MG/DL (ref 0.1–1.5)
BUN SERPL-MCNC: 13 MG/DL (ref 8–22)
CALCIUM SERPL-MCNC: 9.2 MG/DL (ref 8.5–10.5)
CHLORIDE SERPL-SCNC: 105 MMOL/L (ref 96–112)
CO2 SERPL-SCNC: 19 MMOL/L (ref 20–33)
CREAT SERPL-MCNC: 0.84 MG/DL (ref 0.5–1.4)
EOSINOPHIL # BLD AUTO: 0 K/UL (ref 0–0.51)
EOSINOPHIL NFR BLD: 0 % (ref 0–6.9)
ERYTHROCYTE [DISTWIDTH] IN BLOOD BY AUTOMATED COUNT: 42.8 FL (ref 35.9–50)
ERYTHROCYTE [DISTWIDTH] IN BLOOD BY AUTOMATED COUNT: 44.7 FL (ref 35.9–50)
GFR SERPLBLD CREATININE-BSD FMLA CKD-EPI: 76 ML/MIN/1.73 M 2
GLOBULIN SER CALC-MCNC: 3 G/DL (ref 1.9–3.5)
GLUCOSE BLD STRIP.AUTO-MCNC: 109 MG/DL (ref 65–99)
GLUCOSE BLD STRIP.AUTO-MCNC: 112 MG/DL (ref 65–99)
GLUCOSE BLD STRIP.AUTO-MCNC: 151 MG/DL (ref 65–99)
GLUCOSE BLD STRIP.AUTO-MCNC: 96 MG/DL (ref 65–99)
GLUCOSE SERPL-MCNC: 117 MG/DL (ref 65–99)
HCT VFR BLD AUTO: 21.7 % (ref 37–47)
HCT VFR BLD AUTO: 21.8 % (ref 37–47)
HEMOCCULT STL QL: POSITIVE
HGB BLD-MCNC: 7.5 G/DL (ref 12–16)
HGB BLD-MCNC: 7.6 G/DL (ref 12–16)
HGB RETIC QN AUTO: 36.5 PG/CELL (ref 29–35)
IMM RETICS NFR: 7 % (ref 9.3–17.4)
INR PPP: 1.12 (ref 0.87–1.13)
LDH SERPL L TO P-CCNC: 134 U/L (ref 107–266)
LYMPHOCYTES # BLD AUTO: 0.6 K/UL (ref 1–4.8)
LYMPHOCYTES NFR BLD: 100 % (ref 22–41)
MANUAL DIFF BLD: NORMAL
MCH RBC QN AUTO: 30.5 PG (ref 27–33)
MCH RBC QN AUTO: 30.8 PG (ref 27–33)
MCHC RBC AUTO-ENTMCNC: 34.4 G/DL (ref 33.6–35)
MCHC RBC AUTO-ENTMCNC: 35 G/DL (ref 33.6–35)
MCV RBC AUTO: 87.9 FL (ref 81.4–97.8)
MCV RBC AUTO: 88.6 FL (ref 81.4–97.8)
MICROCYTES BLD QL SMEAR: ABNORMAL
MONOCYTES # BLD AUTO: 0 K/UL (ref 0–0.85)
MONOCYTES NFR BLD AUTO: 0 % (ref 0–13.4)
MORPHOLOGY BLD-IMP: NORMAL
NEUTROPHILS # BLD AUTO: 0 K/UL (ref 2–7.15)
NEUTROPHILS NFR BLD: 0 % (ref 44–72)
NRBC # BLD AUTO: 0 K/UL
NRBC BLD-RTO: 0 /100 WBC
OVALOCYTES BLD QL SMEAR: NORMAL
PATHOLOGY CONSULT NOTE: NORMAL
PATHOLOGY CONSULT NOTE: NORMAL
PLATELET # BLD AUTO: 53 K/UL (ref 164–446)
PLATELET # BLD AUTO: 61 K/UL (ref 164–446)
PLATELET BLD QL SMEAR: NORMAL
PMV BLD AUTO: 10.3 FL (ref 9–12.9)
PMV BLD AUTO: 10.6 FL (ref 9–12.9)
POIKILOCYTOSIS BLD QL SMEAR: NORMAL
POTASSIUM SERPL-SCNC: 3.9 MMOL/L (ref 3.6–5.5)
PROT SERPL-MCNC: 6.1 G/DL (ref 6–8.2)
PROTHROMBIN TIME: 14.3 SEC (ref 12–14.6)
RBC # BLD AUTO: 2.46 M/UL (ref 4.2–5.4)
RBC # BLD AUTO: 2.47 M/UL (ref 4.2–5.4)
RBC BLD AUTO: PRESENT
RETICS # AUTO: 0.02 M/UL (ref 0.04–0.06)
RETICS/RBC NFR: 1 % (ref 0.8–2.1)
SCCMEC + MECA PNL NOSE NAA+PROBE: NEGATIVE
SCCMEC + MECA PNL NOSE NAA+PROBE: NEGATIVE
SODIUM SERPL-SCNC: 135 MMOL/L (ref 135–145)
URATE SERPL-MCNC: 3.6 MG/DL (ref 1.9–8.2)
WBC # BLD AUTO: 0.6 K/UL (ref 4.8–10.8)
WBC # BLD AUTO: 0.7 K/UL (ref 4.8–10.8)

## 2022-07-22 PROCEDURE — 700102 HCHG RX REV CODE 250 W/ 637 OVERRIDE(OP): Performed by: STUDENT IN AN ORGANIZED HEALTH CARE EDUCATION/TRAINING PROGRAM

## 2022-07-22 PROCEDURE — 82272 OCCULT BLD FECES 1-3 TESTS: CPT

## 2022-07-22 PROCEDURE — 36415 COLL VENOUS BLD VENIPUNCTURE: CPT

## 2022-07-22 PROCEDURE — 85610 PROTHROMBIN TIME: CPT

## 2022-07-22 PROCEDURE — 87640 STAPH A DNA AMP PROBE: CPT

## 2022-07-22 PROCEDURE — 700111 HCHG RX REV CODE 636 W/ 250 OVERRIDE (IP): Performed by: STUDENT IN AN ORGANIZED HEALTH CARE EDUCATION/TRAINING PROGRAM

## 2022-07-22 PROCEDURE — 85007 BL SMEAR W/DIFF WBC COUNT: CPT

## 2022-07-22 PROCEDURE — 079T3ZX DRAINAGE OF BONE MARROW, PERCUTANEOUS APPROACH, DIAGNOSTIC: ICD-10-PCS | Performed by: HOSPITALIST

## 2022-07-22 PROCEDURE — 85025 COMPLETE CBC W/AUTO DIFF WBC: CPT

## 2022-07-22 PROCEDURE — 87641 MR-STAPH DNA AMP PROBE: CPT

## 2022-07-22 PROCEDURE — 700101 HCHG RX REV CODE 250: Performed by: STUDENT IN AN ORGANIZED HEALTH CARE EDUCATION/TRAINING PROGRAM

## 2022-07-22 PROCEDURE — 160025 RECOVERY II MINUTES (STATS): Performed by: HOSPITALIST

## 2022-07-22 PROCEDURE — 99152 MOD SED SAME PHYS/QHP 5/>YRS: CPT | Performed by: HOSPITALIST

## 2022-07-22 PROCEDURE — 88185 FLOWCYTOMETRY/TC ADD-ON: CPT

## 2022-07-22 PROCEDURE — 38222 DX BONE MARROW BX & ASPIR: CPT | Performed by: HOSPITALIST

## 2022-07-22 PROCEDURE — 88184 FLOWCYTOMETRY/ TC 1 MARKER: CPT

## 2022-07-22 PROCEDURE — 770004 HCHG ROOM/CARE - ONCOLOGY PRIVATE *

## 2022-07-22 PROCEDURE — 88237 TISSUE CULTURE BONE MARROW: CPT

## 2022-07-22 PROCEDURE — 700105 HCHG RX REV CODE 258: Performed by: STUDENT IN AN ORGANIZED HEALTH CARE EDUCATION/TRAINING PROGRAM

## 2022-07-22 PROCEDURE — 160046 HCHG PACU - 1ST 60 MINS PHASE II: Performed by: HOSPITALIST

## 2022-07-22 PROCEDURE — 700111 HCHG RX REV CODE 636 W/ 250 OVERRIDE (IP): Performed by: HOSPITALIST

## 2022-07-22 PROCEDURE — 85027 COMPLETE CBC AUTOMATED: CPT

## 2022-07-22 PROCEDURE — 82962 GLUCOSE BLOOD TEST: CPT

## 2022-07-22 PROCEDURE — 84550 ASSAY OF BLOOD/URIC ACID: CPT

## 2022-07-22 PROCEDURE — 160048 HCHG OR STATISTICAL LEVEL 1-5: Performed by: HOSPITALIST

## 2022-07-22 PROCEDURE — 88305 TISSUE EXAM BY PATHOLOGIST: CPT | Mod: 59

## 2022-07-22 PROCEDURE — 07DT3ZX EXTRACTION OF BONE MARROW, PERCUTANEOUS APPROACH, DIAGNOSTIC: ICD-10-PCS | Performed by: HOSPITALIST

## 2022-07-22 PROCEDURE — 99233 SBSQ HOSP IP/OBS HIGH 50: CPT | Mod: 25,GC | Performed by: INTERNAL MEDICINE

## 2022-07-22 PROCEDURE — 88311 DECALCIFY TISSUE: CPT

## 2022-07-22 PROCEDURE — 88313 SPECIAL STAINS GROUP 2: CPT | Mod: 91

## 2022-07-22 PROCEDURE — 160027 HCHG SURGERY MINUTES - 1ST 30 MINS LEVEL 2: Performed by: HOSPITALIST

## 2022-07-22 PROCEDURE — 85046 RETICYTE/HGB CONCENTRATE: CPT

## 2022-07-22 PROCEDURE — A9270 NON-COVERED ITEM OR SERVICE: HCPCS | Performed by: STUDENT IN AN ORGANIZED HEALTH CARE EDUCATION/TRAINING PROGRAM

## 2022-07-22 PROCEDURE — 70450 CT HEAD/BRAIN W/O DYE: CPT | Mod: ME

## 2022-07-22 PROCEDURE — 80053 COMPREHEN METABOLIC PANEL: CPT

## 2022-07-22 PROCEDURE — 83615 LACTATE (LD) (LDH) ENZYME: CPT

## 2022-07-22 RX ORDER — DEXTROSE MONOHYDRATE 25 G/50ML
25 INJECTION, SOLUTION INTRAVENOUS
Status: DISCONTINUED | OUTPATIENT
Start: 2022-07-22 | End: 2022-07-24 | Stop reason: HOSPADM

## 2022-07-22 RX ORDER — FENOFIBRATE 134 MG/1
134 CAPSULE ORAL DAILY
Status: DISCONTINUED | OUTPATIENT
Start: 2022-07-22 | End: 2022-07-24 | Stop reason: HOSPADM

## 2022-07-22 RX ORDER — ACETAMINOPHEN 325 MG/1
650 TABLET ORAL EVERY 4 HOURS PRN
Status: DISCONTINUED | OUTPATIENT
Start: 2022-07-22 | End: 2022-07-22

## 2022-07-22 RX ORDER — ENOXAPARIN SODIUM 100 MG/ML
40 INJECTION SUBCUTANEOUS DAILY
Status: DISCONTINUED | OUTPATIENT
Start: 2022-07-22 | End: 2022-07-24 | Stop reason: HOSPADM

## 2022-07-22 RX ORDER — FENOFIBRATE 145 MG/1
145 TABLET, COATED ORAL DAILY
Status: DISCONTINUED | OUTPATIENT
Start: 2022-07-22 | End: 2022-07-22

## 2022-07-22 RX ORDER — INSULIN LISPRO 100 [IU]/ML
2-9 INJECTION, SOLUTION INTRAVENOUS; SUBCUTANEOUS EVERY 6 HOURS
Status: DISCONTINUED | OUTPATIENT
Start: 2022-07-22 | End: 2022-07-22

## 2022-07-22 RX ORDER — VORICONAZOLE 200 MG/1
200 TABLET, FILM COATED ORAL 2 TIMES DAILY
Status: DISCONTINUED | OUTPATIENT
Start: 2022-07-22 | End: 2022-07-24 | Stop reason: HOSPADM

## 2022-07-22 RX ORDER — LIDOCAINE AND PRILOCAINE 25; 25 MG/G; MG/G
CREAM TOPICAL PRN
Status: DISCONTINUED | OUTPATIENT
Start: 2022-07-22 | End: 2022-07-24 | Stop reason: HOSPADM

## 2022-07-22 RX ORDER — BACLOFEN 10 MG/1
10 TABLET ORAL 4 TIMES DAILY
Status: DISCONTINUED | OUTPATIENT
Start: 2022-07-22 | End: 2022-07-24 | Stop reason: HOSPADM

## 2022-07-22 RX ORDER — LOSARTAN POTASSIUM AND HYDROCHLOROTHIAZIDE 12.5; 1 MG/1; MG/1
1 TABLET ORAL DAILY
Status: DISCONTINUED | OUTPATIENT
Start: 2022-07-22 | End: 2022-07-22

## 2022-07-22 RX ORDER — HYDROCHLOROTHIAZIDE 25 MG/1
12.5 TABLET ORAL
Status: DISCONTINUED | OUTPATIENT
Start: 2022-07-22 | End: 2022-07-24 | Stop reason: HOSPADM

## 2022-07-22 RX ORDER — BISACODYL 10 MG
10 SUPPOSITORY, RECTAL RECTAL
Status: DISCONTINUED | OUTPATIENT
Start: 2022-07-22 | End: 2022-07-24 | Stop reason: HOSPADM

## 2022-07-22 RX ORDER — AMOXICILLIN 250 MG
2 CAPSULE ORAL 2 TIMES DAILY
Status: DISCONTINUED | OUTPATIENT
Start: 2022-07-22 | End: 2022-07-24 | Stop reason: HOSPADM

## 2022-07-22 RX ORDER — ZONISAMIDE 50 MG/1
100 CAPSULE ORAL DAILY
Status: DISCONTINUED | OUTPATIENT
Start: 2022-07-22 | End: 2022-07-22

## 2022-07-22 RX ORDER — DEXTROSE MONOHYDRATE 25 G/50ML
25 INJECTION, SOLUTION INTRAVENOUS
Status: DISCONTINUED | OUTPATIENT
Start: 2022-07-22 | End: 2022-07-22

## 2022-07-22 RX ORDER — POLYETHYLENE GLYCOL 3350 17 G/17G
1 POWDER, FOR SOLUTION ORAL
Status: DISCONTINUED | OUTPATIENT
Start: 2022-07-22 | End: 2022-07-24 | Stop reason: HOSPADM

## 2022-07-22 RX ORDER — INSULIN LISPRO 100 [IU]/ML
3-9 INJECTION, SOLUTION INTRAVENOUS; SUBCUTANEOUS
Status: DISCONTINUED | OUTPATIENT
Start: 2022-07-23 | End: 2022-07-24 | Stop reason: HOSPADM

## 2022-07-22 RX ORDER — GABAPENTIN 100 MG/1
100 CAPSULE ORAL 2 TIMES DAILY
Status: DISCONTINUED | OUTPATIENT
Start: 2022-07-22 | End: 2022-07-24 | Stop reason: HOSPADM

## 2022-07-22 RX ORDER — BUPROPION HYDROCHLORIDE 100 MG/1
200 TABLET ORAL DAILY
Status: DISCONTINUED | OUTPATIENT
Start: 2022-07-22 | End: 2022-07-22

## 2022-07-22 RX ORDER — LOSARTAN POTASSIUM 50 MG/1
100 TABLET ORAL
Status: DISCONTINUED | OUTPATIENT
Start: 2022-07-22 | End: 2022-07-24 | Stop reason: HOSPADM

## 2022-07-22 RX ORDER — ACYCLOVIR 200 MG/1
400 CAPSULE ORAL 2 TIMES DAILY
Status: DISCONTINUED | OUTPATIENT
Start: 2022-07-22 | End: 2022-07-24 | Stop reason: HOSPADM

## 2022-07-22 RX ORDER — SODIUM CHLORIDE 9 MG/ML
500 INJECTION, SOLUTION INTRAVENOUS
Status: DISCONTINUED | OUTPATIENT
Start: 2022-07-22 | End: 2022-07-22 | Stop reason: HOSPADM

## 2022-07-22 RX ORDER — MIDAZOLAM HYDROCHLORIDE 1 MG/ML
.5-2 INJECTION INTRAMUSCULAR; INTRAVENOUS PRN
Status: DISCONTINUED | OUTPATIENT
Start: 2022-07-22 | End: 2022-07-22 | Stop reason: HOSPADM

## 2022-07-22 RX ORDER — SODIUM CHLORIDE, SODIUM LACTATE, POTASSIUM CHLORIDE, CALCIUM CHLORIDE 600; 310; 30; 20 MG/100ML; MG/100ML; MG/100ML; MG/100ML
INJECTION, SOLUTION INTRAVENOUS CONTINUOUS
Status: ACTIVE | OUTPATIENT
Start: 2022-07-22 | End: 2022-07-22

## 2022-07-22 RX ORDER — ACETAMINOPHEN 325 MG/1
650 TABLET ORAL EVERY 6 HOURS PRN
Status: DISCONTINUED | OUTPATIENT
Start: 2022-07-22 | End: 2022-07-24 | Stop reason: HOSPADM

## 2022-07-22 RX ORDER — ZONISAMIDE 50 MG/1
150 CAPSULE ORAL DAILY
Status: DISCONTINUED | OUTPATIENT
Start: 2022-07-22 | End: 2022-07-24 | Stop reason: HOSPADM

## 2022-07-22 RX ORDER — PROCHLORPERAZINE MALEATE 10 MG
10 TABLET ORAL EVERY 6 HOURS PRN
Status: DISCONTINUED | OUTPATIENT
Start: 2022-07-22 | End: 2022-07-24 | Stop reason: HOSPADM

## 2022-07-22 RX ORDER — ONDANSETRON 4 MG/1
4 TABLET, ORALLY DISINTEGRATING ORAL EVERY 6 HOURS PRN
Status: DISCONTINUED | OUTPATIENT
Start: 2022-07-22 | End: 2022-07-24 | Stop reason: HOSPADM

## 2022-07-22 RX ORDER — HEPARIN SODIUM (PORCINE) LOCK FLUSH IV SOLN 100 UNIT/ML 100 UNIT/ML
500 SOLUTION INTRAVENOUS PRN
Status: DISCONTINUED | OUTPATIENT
Start: 2022-07-22 | End: 2022-07-24 | Stop reason: HOSPADM

## 2022-07-22 RX ORDER — BUPROPION HYDROCHLORIDE 100 MG/1
200 TABLET, EXTENDED RELEASE ORAL DAILY
Status: DISCONTINUED | OUTPATIENT
Start: 2022-07-22 | End: 2022-07-24 | Stop reason: HOSPADM

## 2022-07-22 RX ORDER — SUMATRIPTAN 25 MG/1
25 TABLET, FILM COATED ORAL
Status: DISCONTINUED | OUTPATIENT
Start: 2022-07-22 | End: 2022-07-24 | Stop reason: HOSPADM

## 2022-07-22 RX ADMIN — VORICONAZOLE 200 MG: 200 TABLET ORAL at 09:12

## 2022-07-22 RX ADMIN — SENNOSIDES AND DOCUSATE SODIUM 2 TABLET: 50; 8.6 TABLET ORAL at 17:50

## 2022-07-22 RX ADMIN — ACYCLOVIR 400 MG: 200 CAPSULE ORAL at 09:08

## 2022-07-22 RX ADMIN — ACETAMINOPHEN 650 MG: 325 TABLET, FILM COATED ORAL at 02:47

## 2022-07-22 RX ADMIN — LIDOCAINE AND PRILOCAINE 1 G: 25; 25 CREAM TOPICAL at 15:42

## 2022-07-22 RX ADMIN — GABAPENTIN 100 MG: 100 CAPSULE ORAL at 09:11

## 2022-07-22 RX ADMIN — HYDROCHLOROTHIAZIDE 12.5 MG: 25 TABLET ORAL at 09:10

## 2022-07-22 RX ADMIN — BACLOFEN 10 MG: 10 TABLET ORAL at 13:54

## 2022-07-22 RX ADMIN — ZONISAMIDE 150 MG: 50 CAPSULE ORAL at 09:12

## 2022-07-22 RX ADMIN — CEFEPIME 2 G: 2 INJECTION, POWDER, FOR SOLUTION INTRAVENOUS at 13:54

## 2022-07-22 RX ADMIN — BUPROPION HYDROCHLORIDE 200 MG: 100 TABLET, EXTENDED RELEASE ORAL at 13:52

## 2022-07-22 RX ADMIN — CEFEPIME 2 G: 2 INJECTION, POWDER, FOR SOLUTION INTRAVENOUS at 21:08

## 2022-07-22 RX ADMIN — VORICONAZOLE 200 MG: 200 TABLET ORAL at 17:49

## 2022-07-22 RX ADMIN — HEPARIN 500 UNITS: 100 SYRINGE at 18:41

## 2022-07-22 RX ADMIN — ACYCLOVIR 400 MG: 200 CAPSULE ORAL at 17:49

## 2022-07-22 RX ADMIN — LEVOTHYROXINE SODIUM 175 MCG: 0.12 TABLET ORAL at 09:07

## 2022-07-22 RX ADMIN — LOSARTAN POTASSIUM 100 MG: 50 TABLET, FILM COATED ORAL at 09:10

## 2022-07-22 RX ADMIN — FENOFIBRATE 134 MG: 134 CAPSULE ORAL at 13:53

## 2022-07-22 RX ADMIN — SENNOSIDES AND DOCUSATE SODIUM 2 TABLET: 50; 8.6 TABLET ORAL at 09:06

## 2022-07-22 RX ADMIN — BACLOFEN 10 MG: 10 TABLET ORAL at 09:09

## 2022-07-22 ASSESSMENT — ENCOUNTER SYMPTOMS
ABDOMINAL PAIN: 1
EYE PAIN: 0
DOUBLE VISION: 0
NECK PAIN: 0
WEIGHT LOSS: 0
SPEECH CHANGE: 0
CONSTIPATION: 0
HEADACHES: 1
VOMITING: 0
MYALGIAS: 0
NAUSEA: 0
TINGLING: 0
FOCAL WEAKNESS: 0
CHILLS: 1
SHORTNESS OF BREATH: 0
HEARTBURN: 0
NERVOUS/ANXIOUS: 0
HEADACHES: 0
BACK PAIN: 0
ABDOMINAL PAIN: 0
PALPITATIONS: 0
DEPRESSION: 0
DIZZINESS: 0
DIARRHEA: 0
CHILLS: 0
BLURRED VISION: 0
COUGH: 1
SPUTUM PRODUCTION: 0
HEMOPTYSIS: 0
FEVER: 1
SENSORY CHANGE: 0
TREMORS: 0

## 2022-07-22 ASSESSMENT — COGNITIVE AND FUNCTIONAL STATUS - GENERAL
WALKING IN HOSPITAL ROOM: A LITTLE
SUGGESTED CMS G CODE MODIFIER MOBILITY: CK
DRESSING REGULAR LOWER BODY CLOTHING: A LITTLE
SUGGESTED CMS G CODE MODIFIER DAILY ACTIVITY: CH
TURNING FROM BACK TO SIDE WHILE IN FLAT BAD: A LITTLE
MOVING TO AND FROM BED TO CHAIR: A LITTLE
DAILY ACTIVITIY SCORE: 23
STANDING UP FROM CHAIR USING ARMS: A LITTLE
SUGGESTED CMS G CODE MODIFIER MOBILITY: CI
SUGGESTED CMS G CODE MODIFIER DAILY ACTIVITY: CI
CLIMB 3 TO 5 STEPS WITH RAILING: A LITTLE
MOVING FROM LYING ON BACK TO SITTING ON SIDE OF FLAT BED: A LITTLE
DAILY ACTIVITIY SCORE: 24
MOBILITY SCORE: 19
MOBILITY SCORE: 23

## 2022-07-22 ASSESSMENT — PAIN DESCRIPTION - PAIN TYPE
TYPE: SURGICAL PAIN
TYPE: ACUTE PAIN
TYPE: SURGICAL PAIN
TYPE: ACUTE PAIN
TYPE: SURGICAL PAIN
TYPE: ACUTE PAIN
TYPE: SURGICAL PAIN

## 2022-07-22 ASSESSMENT — PATIENT HEALTH QUESTIONNAIRE - PHQ9
2. FEELING DOWN, DEPRESSED, IRRITABLE, OR HOPELESS: NOT AT ALL
SUM OF ALL RESPONSES TO PHQ9 QUESTIONS 1 AND 2: 0
1. LITTLE INTEREST OR PLEASURE IN DOING THINGS: NOT AT ALL

## 2022-07-22 ASSESSMENT — FIBROSIS 4 INDEX: FIB4 SCORE: 5.16

## 2022-07-22 NOTE — ED NOTES
Transport at   Pt transported upstairs to [facility] A&O x4 in stable condition w/ chart and all belongings

## 2022-07-22 NOTE — CARE PLAN
The patient is Watcher - Medium risk of patient condition declining or worsening    Shift Goals  Clinical Goals: Improved hemoglobin; Safety  Patient Goals: Pain management; Sleep  Family Goals: Not present    Progress made toward(s) clinical / shift goals: Patient's hemoglobin on admission is 3.4, received 1 unit PRBCs in ER. Inquired with MD regarding recheck of CBC, pending orders. Patient medicated for pain per MAR. Care clustered to promote rest.     Problem: Knowledge Deficit - Standard  Goal: Patient and family/care givers will demonstrate understanding of plan of care, disease process/condition, diagnostic tests and medications  Outcome: Progressing     Problem: Pain - Standard  Goal: Alleviation of pain or a reduction in pain to the patient’s comfort goal  Outcome: Progressing     Problem: Neutropenia Precautions  Goal: Neutropenic precautions will be implemented and maintained for patient protection  Outcome: Progressing     Problem: Optimal Care for the Febrile Neutropenia Patient  Goal: Patient will avoid the development of an infection by achieving a neutrophil count within normal range  Outcome: Progressing     Problem: Risk for Impaired Oral Mucous Membranes  Goal: Patient's oral mucus membranes will remain pink, moist, and free of inflammation/ulcerations or improve  Outcome: Progressing     Problem: Communication  Goal: The ability to communicate needs accurately and effectively will improve  Outcome: Progressing     Problem: Discharge Barriers/Planning  Goal: Patient's continuum of care needs are met  Outcome: Progressing     Problem: Mobility  Goal: Patient's capacity to carry out activities will improve  Outcome: Progressing     Problem: Self Care  Goal: Patient will have the ability to perform ADLs independently or with assistance (bathe, groom, dress, toilet and feed)  Outcome: Progressing     Problem: Infection - Standard  Goal: Patient will remain free from infection  Outcome: Progressing        Patient is not progressing towards the following goals:

## 2022-07-22 NOTE — ED NOTES
Rounded on pt, resting in bed, NAD  Denies any needs at this time, call light in reach  House bed ordered for comfort

## 2022-07-22 NOTE — ED NOTES
RN rounded with patient. She is asking when she will received blood transfusion.   She reports nausea and HA. She is asking if she would try to eat something as she thinks some of her nausea and headache is related to her not eating today.   Dr. Estrada notified via [facility]T. She is aware of hem and will order transfusion. MD states she will go to bedside to update pt on plan.

## 2022-07-22 NOTE — ASSESSMENT & PLAN NOTE
Patient presents with fever of 101.7 degrees. No prior micro results available.  Patient denies any prior neutropenic fever/infection history. Unclear source, as patient is chest x-ray normal, and no dysuria, no signs of skin infection. Imaging with diverticulosis not diverticulitis.  Large stool burden. Last fever documented 7/22/22 @0300. Multiple BM since admission.  - Continue bowel protocol  - Currently no signs of meningitis clinically, monitor closely if needed consider LP  - IV cefipeme ppx  - daily CBC with differential  - will trial chemotherapy administration and reassess

## 2022-07-22 NOTE — PROGRESS NOTES
Srinath from Lab called with critical result of WBC at 0.7. Critical lab result read back to Srinath.   This critical lab result is within parameters established by  for this patient

## 2022-07-22 NOTE — PROCEDURES
Bone Marrow Biopsy/Aspiration    Date/Time: 7/22/2022 11:39 AM  Performed by: Mik Hanson M.D.  Authorized by: Mik Hansno M.D.     Consent:     Consent obtained:  Written    Consent given by:  Patient    Risks discussed:  Bleeding, pain, infection and repeat procedure    Alternatives discussed:  No treatment  Universal protocol:     Procedure explained and questions answered to patient or proxy's satisfaction: yes      Relevant documents present and verified: yes      Test results available and properly labeled: yes      Imaging studies available: yes      Required blood products, implants, devices, and special equipment available: yes      Immediately prior to procedure a time out was called: yes      Site/side marked: yes      Patient identity confirmed:  Hospital-assigned identification number and verbally with patient  Pre-procedure details:     Procedure type:  Aspiration and biopsy    Requesting physician:  Pati     Indications:  Neutropenia     Position:  Prone    Buttock laterality:  Left    Local anesthetic:  1% Lidocaine    Subcutaneous volume:  1 mL    Periosteum anesthetic volume:  4 mL    Preparation: Patient was prepped and draped in usual sterile fashion    Sedation:     Patient Sedated: Yes      Sedation type: moderate (conscious) sedation      Sedation:  Midazolam    Analgesia:  Fentanyl    Sedation length:  15 minutes  Procedure details:     Aspirate obtained:  5 mL followed by 5 mL    Biopsy performed:  2 cores    Number of attempts:  4+    Estimated blood loss (mL): 3 mL.  Post-procedure:     Puncture site:  Adhesive bandage applied    Patient tolerance of procedure:  Tolerated well, no immediate complications  Comments:      Conscious sedation was administered at 11:46  The procedure finished at 12:00  She was administered 3  Mg IV versed and  100 Mcg IV Fentanyl for conscious sedation.   I personally monitored her until 12:03 due to conscious sedation.

## 2022-07-22 NOTE — H&P
[facility] Internal Medicine History & Physical Note    Date of Service  7/22/2022    [facility] Team  Attending: Dr. Waite  Resident: Dr. Orourke  Contact Number: [phone]    Primary Care Physician  Raf Bradley M.D.    Consultants  Dr. Krueger    Code Status  Full Code    Chief Complaint  Chief Complaint   Patient presents with   • Sent by MD     Sent by oncologist Dr. Treviño for further workup and admit. Has bone marrow biposy scheduled tomorrow. Hx leukemia. On oral chemo, starting infusions monday   • Fever     X1 week. Seen in [place] ED yesterday, unable to find source   • Nausea   • Malaise     Decreased PO intake the last few days     History of Presenting Illness (HPI):   Zena Myers is a 66 y.o. female with past medical history of AML, diagnosed in May 2022, treated with venetoclax and decitabine, who presented from [place] on 7/21/2022 with fever of 101.7 degrees F.  She is on prophylactic voriconazole and acyclovir.  Not on any antibiotics.    Patient reports that starting yesterday, she has been feeling very sick.  Reporting migraine headaches, lightheadedness, nausea with 1 episode of vomiting, fatigue,     I discussed the plan of care with patient and family.    Review of Systems  Review of Systems   Constitutional: Positive for fever. Negative for chills, malaise/fatigue and weight loss.   HENT: Negative for ear pain, hearing loss and tinnitus.    Eyes: Negative for blurred vision and double vision.   Respiratory: Positive for cough (Cough at baseline, no sputum). Negative for hemoptysis, sputum production and shortness of breath.    Cardiovascular: Negative for chest pain and palpitations.   Gastrointestinal: Positive for abdominal pain. Negative for constipation, diarrhea, heartburn, nausea and vomiting.   Genitourinary: Negative for dysuria, frequency and urgency.   Musculoskeletal: Negative for back pain, myalgias and neck pain.   Neurological: Positive for headaches (Migraine headache and  neck pain). Negative for dizziness, tingling and tremors.   Psychiatric/Behavioral: Negative for depression and suicidal ideas.   All other systems reviewed and are negative.    Past Medical History   has a past medical history of Acute nasopharyngitis (01/15/2022), Anemia, Anesthesia, Arthritis, Breath shortness, Bronchitis (2010), Cancer (HCC), Cough, Diabetes (HCC), Disorder of thyroid, Heart burn, Hiatus hernia syndrome, High cholesterol, Hypertension, Indigestion, Psychiatric problem, Renal disorder, and Urinary incontinence.    Surgical History   has a past surgical history that includes thyroidectomy total (2005); colonoscopy; pr dx bone marrow aspirations (Left, 4/19/2022); and pr dx bone marrow biopsies (Left, 4/19/2022).     Family History  family history is not on file.   Patient is unaware of any family history    Social History  Tobacco: No  Alcohol: No  Recreational drugs (illegal or prescription): No  Employment: [employer] records  Living Situation: Alone  Recent Travel: No, from [place]    Allergies  Allergies   Allergen Reactions   • Clindamycin Rash and Swelling     rash   • Pcn [Penicillins]      Family history     Medications  Prior to Admission Medications   Prescriptions Last Dose Informant Patient Reported? Taking?   Exenatide (BYDUREON SC) 7/20/2022 at >3 DAYS  Yes Yes   Sig: Inject 1 Dose under the skin every 7 days.   SUMAtriptan (IMITREX) 25 MG Tab tablet PRN at PRN Patient No No   Sig: Take 1 Tablet by mouth every 2 hours as needed for Migraine.   acetaminophen (TYLENOL) 325 MG Tab 7/20/2022 at Unknown time  Yes Yes   Sig: Take 650 mg by mouth every four hours as needed.   acyclovir (ZOVIRAX) 200 MG Cap 7/20/2022 at pm Patient No No   Sig: Take 2 Capsules by mouth 2 times a day.   baclofen (LIORESAL) 10 MG Tab 7/20/2022 at UNK Patient Yes No   Sig: Take 10 mg by mouth 4 times a day.   buPROPion (WELLBUTRIN SR) 200 MG SR tablet 7/20/2022 at Unknown time Patient Yes No   Sig: Take 200 mg by mouth  every day.   fenofibrate (TRICOR) 145 MG Tab 7/20/2022 at Unknown time Patient Yes No   Sig: Take 145 mg by mouth every day.   gabapentin (NEURONTIN) 100 MG Cap 7/20/2022 at Unknown time Patient Yes No   Sig: Take 100 mg by mouth 2 times a day. prn   insulin glargine (LANTUS) 100 UNIT/ML Solution 7/21/2022 at AM  No No   Sig: Inject 120 Units under the skin 2 times a day.   Patient taking differently: Inject 180 Units under the skin 2 times a day.   levothyroxine (SYNTHROID) 175 MCG Tab 7/20/2022 at AM Patient Yes No   Sig: Take 175 mcg by mouth every morning on an empty stomach.   losartan-hydrochlorothiazide (HYZAAR) 100-12.5 MG per tablet 7/20/2022 at Unknown time Patient Yes No   Sig: Take 1 Tablet by mouth every day.   ondansetron (ZOFRAN ODT) 4 MG TABLET DISPERSIBLE PRN at PRN Patient No No   Sig: Take 1 Tablet by mouth every 6 hours as needed for Nausea.   prochlorperazine (COMPAZINE) 10 MG Tab PRN at PRN Patient Yes No   Sig: Take 10 mg by mouth every 6 hours as needed.   venetoclax (VENCLEXTA) 100 MG tablet 7/20/2022 at Unknown time Patient No No   Sig: Take 1 Tablet by mouth every day.   voriconazole (VFEND) 200 MG Tab 7/21/2022 at AM Patient No No   Sig: Take 1 Tablet by mouth 2 times a day.   zonisamide (ZONEGRAN) 100 MG Cap 7/20/2022 at Unknown time Patient Yes No   Sig: Take 100 mg by mouth every day. W/50MG CAPSULE   zonisamide (ZONEGRAN) 50 MG capsule 7/20/2022 at Unknown time Patient Yes No   Sig: Take 50 mg by mouth every day. W/100MG CAPSULE      Facility-Administered Medications: None     Physical Exam  Temp:  [36.1 °C (97 °F)-38.1 °C (100.5 °F)] 36.8 °C (98.2 °F)  Pulse:  [67-87] 71  Resp:  [12-22] 18  BP: (121-151)/(50-77) 137/50  SpO2:  [91 %-97 %] 94 %  Blood Pressure : 134/63   Temperature: 36.4 °C (97.5 °F)   Pulse: 78   Respiration: 14   Pulse Oximetry: 94 %     Physical Exam  Constitutional:       General: She is not in acute distress.     Appearance: Normal appearance. She is obese. She  is ill-appearing and toxic-appearing.   HENT:      Head: Normocephalic and atraumatic.      Nose: Nose normal.      Mouth/Throat:      Mouth: Mucous membranes are moist.      Pharynx: No oropharyngeal exudate.   Eyes:      General: No scleral icterus.     Extraocular Movements: Extraocular movements intact.      Pupils: Pupils are equal, round, and reactive to light.   Cardiovascular:      Rate and Rhythm: Normal rate and regular rhythm.      Pulses: Normal pulses.      Heart sounds: No murmur heard.  Pulmonary:      Effort: Pulmonary effort is normal.      Breath sounds: No wheezing or rales.   Abdominal:      General: Abdomen is flat.      Palpations: Abdomen is soft.      Tenderness: There is abdominal tenderness (Left lower quadrant tenderness). There is no guarding or rebound.   Musculoskeletal:         General: Normal range of motion.      Cervical back: Normal range of motion.      Right lower leg: No edema.      Left lower leg: No edema.   Skin:     General: Skin is warm.      Capillary Refill: Capillary refill takes less than 2 seconds.      Coloration: Skin is pale.      Findings: No bruising, lesion or rash.   Neurological:      General: No focal deficit present.      Mental Status: She is alert and oriented to person, place, and time.      Motor: No weakness (Generalized weakness.  No focal neuro weakness).   Psychiatric:         Mood and Affect: Mood normal.         Behavior: Behavior normal.         Thought Content: Thought content normal.         Judgment: Judgment normal.     Laboratory:  Recent Labs     07/21/22  1732   WBC 0.8*   RBC 1.13*   HEMOGLOBIN 3.4*   HEMATOCRIT 9.8*   MCV 86.7   MCH 30.1   MCHC 34.7   RDW 43.4   PLATELETCT 70*   MPV 10.1     Recent Labs     07/21/22  1732   SODIUM 132*   POTASSIUM 3.8   CHLORIDE 101   CO2 19*   GLUCOSE 188*   BUN 17   CREATININE 1.08   CALCIUM 9.4     Recent Labs     07/21/22  1732   ALTSGPT 27   ASTSGOT 28   ALKPHOSPHAT 107*   TBILIRUBIN 0.5   LIPASE 14    GLUCOSE 188*         No results for input(s): NTPROBNP in the last 72 hours.      Recent Labs     07/21/22  1732   TROPONINT 12     Imaging:  CT-HEAD W/O   Final Result         1.  No acute intracranial abnormality.         CT-ABDOMEN-PELVIS W/O   Final Result      1.  No acute abnormality within the abdomen or pelvis      2.  Hepatomegaly      3.  Colonic diverticulosis and there is increase in expected amount of stool within the colon      US-RUQ   Final Result      1.  Echogenic liver consistent with hepatic steatosis      2.  Negative for gallstones or biliary dilation      DX-CHEST-PORTABLE (1 VIEW)   Final Result      No acute cardiopulmonary abnormality identified.        X-Ray:  I have personally reviewed the images and compared with prior images.    Assessment/Plan:  Problem Representation:  Patient is 66-year-old female presenting with neutropenic fever.  She was on chemotherapy for AML, which is being held.  Presented with hemoglobin of 3.4.  I anticipate this patient will require at least two midnights for appropriate medical management, necessitating inpatient admission because Neutropenic fever    * Neutropenic fever (HCC)- (present on admission)  Assessment & Plan  No prior micro results available.  Patient denies any prior neutropenic fever/infection history  Patient presents with fever of 101.7  Will hold chemo, given neutropenic fever  Unclear source, as patient is chest x-ray normal, and no dysuria, no signs of skin infection.  Possibly secondary to some diverticulitis?  Imaging with diverticulosis not diverticulitis.  Large stool burden.  Continue bowel protocol  Consider LP tomorrow, given patient's neck pain and headaches and neutropenic fever  Cefepime.    Pancytopenia (HCC)- (present on admission)  Assessment & Plan  Patient presents with pancytopenia, likely secondary to chemo  Anemia with hemoglobin 3.4  Thrombocytopenia with platelets 70  Leukopenia with white blood cell count  0.8  Continue to transfuse RBCs, to achieve hemoglobin greater than 7  Patient had minimal symptoms, so anemia is likely gradual given how low it is.  No need to transfuse platelets at this time  In the ED, patient received 1 unit of blood.  Second unit given upon arrival to cancer nursing unit.  Will check CBC, then consider giving more blood    Hypertension  Assessment & Plan  Continue home meds    Hypothyroid- (present on admission)  Assessment & Plan  Continue home meds    AML (acute myelogenous leukemia) (HCC)- (present on admission)  Assessment & Plan  Venetoclax and decitabine  Prophylactic fungal and antiviral meds  Patient follows with Dr. Treviño for cancer.  She was supposed to get bone marrow biopsy to reevaluate following chemo.    VTE prophylaxis: enoxaparin ppx

## 2022-07-22 NOTE — CONSULTS
Consult Note: Hematology/Oncology    Date of consultation: 7/22/2022 10:32 AM    Referring provider: Dr Taveras    Reason for consultation: Febrile neutropenia, AML on Dacogen and venetoclax     History of presenting illness:   66-year-old woman with a PMH of hypertension, hyperlipidemia, type 2 diabetes, hypothyroidism, migraines, GERD, depression.  She was found to have significant pancytopenia.  Bone marrow biopsy 4/19/2022 showed AML with MDS related changes,(hypercellular marrow, 20% myeloblasts with multilineage dysplasia.  FISH testing negative for FLT3, IDH 1-2, T p53.  Positive for CEBPA.  She elected to start low intensity induction therapy with Dacogen and venetoclax, starting 5/25/2022.  She was started on voriconazole and acyclovir for PCP prophylaxis.    The patient is being admitted with febrile neutropenia, pancytopenia.  WBC 0.8, hemoglobin 3.4 g/dL, hematocrit 9.8 g/dL.  Platelet 70,000.   she got 1 unit of blood transfusion, her hemoglobin went up to 7.6.  Platelets of 53.  She was started on cefepime, pancultured.  She denied GI bleed, melena.    She was due for cycle 3 of Dacogen and venetoclax next week.  CT scan of the abdomen and pelvis showed no acute abnormality, hepatomegaly and diverticulosis.  CT head with no acute abnormality, chest x-ray with no evidence of pneumonia.  She was pancultured and transferred to the unit.    She did have fatigue, weakness, febrile.  She denies shortness of breath, chest pain, cough, there is no urinary or bowel habit changes.      Past Medical History:    Past Medical History:   Diagnosis Date   • Acute nasopharyngitis 01/15/2022    negative for covid   • Anemia    • Anesthesia     woke up aggrestive   • Arthritis    • Breath shortness    • Bronchitis 2010   • Cancer (HCC)     AML   • Cough    • Diabetes (HCC)    • Disorder of thyroid     thyroid removed   • Heart burn    • Hiatus hernia syndrome     small   • High cholesterol    • Hypertension    •  Indigestion    • Psychiatric problem     depression   • Renal disorder    • Urinary incontinence        Past surgical history:    Past Surgical History:   Procedure Laterality Date   • IL DX BONE MARROW ASPIRATIONS Left 4/19/2022    Procedure: ASPIRATION, BONE MARROW;  Surgeon: Luis A Snider M.D.;  Location: SURGERY SAME DAY [facility];  Service: Orthopedics   • IL DX BONE MARROW BIOPSIES Left 4/19/2022    Procedure: BIOPSY, BONE MARROW, USING NEEDLE OR TROCAR - DR. CARREON;  Surgeon: Luis A Snider M.D.;  Location: SURGERY SAME DAY [facility];  Service: Orthopedics   • THYROIDECTOMY TOTAL  2005   • COLONOSCOPY         Allergies:  Clindamycin and Pcn [penicillins]    Medications:    Current Facility-Administered Medications   Medication Dose Route Frequency Provider Last Rate Last Admin   • senna-docusate (PERICOLACE or SENOKOT S) 8.6-50 MG per tablet 2 Tablet  2 Tablet Oral BID Mayuri Orourke M.D.   2 Tablet at 07/22/22 0906    And   • polyethylene glycol/lytes (MIRALAX) PACKET 1 Packet  1 Packet Oral QDAY PRN Mayuri Orourke M.D.        And   • magnesium hydroxide (MILK OF MAGNESIA) suspension 30 mL  30 mL Oral QDAY PRN Mayuri Orourke M.D.        And   • bisacodyl (DULCOLAX) suppository 10 mg  10 mg Rectal QDAY PRN Mayuri Orourke M.D.       • [Held by provider] enoxaparin (Lovenox) inj 40 mg  40 mg Subcutaneous DAILY AT 1800 Mayuri Orourke M.D.       • acetaminophen (Tylenol) tablet 650 mg  650 mg Oral Q6HRS PRN Mayuri Orourke M.D.   650 mg at 07/22/22 0247   • cefepime (Maxipime) 2 g in dextrose 5% 20 mL IV syringe  2 g Intravenous Q8HRS Mayuri Orourke M.D.       • voriconazole (VFEND) tablet 200 mg  200 mg Oral BID Mayuri Orourke M.D.   200 mg at 07/22/22 0912   • zonisamide (ZONEGRAN) capsule 150 mg  150 mg Oral DAILY Mayuri Orourke M.D.   150 mg at 07/22/22 0912   • SUMAtriptan (IMITREX) tablet 25 mg  25 mg Oral Q2HRS PRN Mayuri Orourke M.D.       •  ondansetron (ZOFRAN ODT) dispertab 4 mg  4 mg Oral Q6HRS PRN Mayuri Orourke M.D.       • prochlorperazine (COMPAZINE) tablet 10 mg  10 mg Oral Q6HRS PRN Mayuri Orourke M.D.       • acyclovir (ZOVIRAX) capsule 400 mg  400 mg Oral BID Mayuri Orourke M.D.   400 mg at 07/22/22 0908   • gabapentin (NEURONTIN) capsule 100 mg  100 mg Oral BID Mayuri Orourke M.D.   100 mg at 07/22/22 0911   • baclofen (LIORESAL) tablet 10 mg  10 mg Oral 4X/DAY Mayuri Orourke M.D.   10 mg at 07/22/22 0909   • buPROPion SR (WELLBUTRIN-SR) tablet 200 mg  200 mg Oral DAILY Mayuri Orourke M.D.       • fenofibrate micronized (LOFIBRA) capsule 134 mg  134 mg Oral DAILY Mayuri Orourke M.D.       • insulin lispro (AdmeLOG,HumaLOG) injection  2-9 Units Subcutaneous Q6HRS Jim Brandt M.D.        And   • dextrose 50% (D50W) injection 25 g  25 g Intravenous Q15 MIN PRN Jim Brandt M.D.       • levothyroxine (SYNTHROID) tablet 175 mcg  175 mcg Oral AM ES Mayuri Orourke M.D.   175 mcg at 07/22/22 0907   • losartan (COZAAR) tablet 100 mg  100 mg Oral Q DAY Mayuri Oroukre M.D.   100 mg at 07/22/22 0910   • hydroCHLOROthiazide (HYDRODIURIL) tablet 12.5 mg  12.5 mg Oral Q DAY Mayuri Orourke M.D.   12.5 mg at 07/22/22 0910   • lidocaine-prilocaine (EMLA) 2.5-2.5 % cream   Topical PRN Jim Brandt M.D.           Social History:     Social History     Socioeconomic History   • Marital status: Single     Spouse name: Not on file   • Number of children: Not on file   • Years of education: Not on file   • Highest education level: Not on file   Occupational History   • Not on file   Tobacco Use   • Smoking status: Never Smoker   • Smokeless tobacco: Never Used   Vaping Use   • Vaping Use: Never used   Substance and Sexual Activity   • Alcohol use: Yes     Comment: occasionally   • Drug use: No   • Sexual activity: Not on file   Other Topics Concern   • Not on file   Social History Narrative   • Not on  "file     Social Determinants of Health     Financial Resource Strain: Not on file   Food Insecurity: Not on file   Transportation Needs: Not on file   Physical Activity: Not on file   Stress: Not on file   Social Connections: Not on file   Intimate Partner Violence: Not on file   Housing Stability: Not on file       Family History:   History reviewed. No pertinent family history.    Review of Systems:   All other review of systems are negative except what was mentioned above in the HPI.    Constitutional: Fever, fatigue, malaise  HEENT: No new auditory or visual complaints. No sore throat and neck pain.     Respiratory:No new cough, sputum production, shortness of breath and wheezing.    Cardiovascular: No new chest pain, palpitations, orthopnea and leg swelling.    Gastrointestinal: No heartburn, nausea, vomiting ,abdominal pain, hematochezia or melena     Genitourinary: Negative for dysuria, hematuria    Musculoskeletal: No new arthralgias or myalgias   Skin: Negative for rash and itching.    Neurological: Negative for focal weakness or headaches.    Endo/Heme/Allergies: No abnormal bleed/bruise.    Psychiatric/Behavioral: No new depression/anxiety.    Physical Exam:   Vitals:   /59   Pulse 66   Temp 36.3 °C (97.4 °F) (Temporal)   Resp 18   Ht 1.651 m (5' 5\")   Wt 107 kg (235 lb 14.3 oz)   LMP 04/15/2005   SpO2 96%   BMI 39.25 kg/m²     General: Not in acute distress, alert and oriented x 3  HEENT: pallor, No  icterus. Oropharynx clear.   Neck: Supple, no palpable masses.  Lymph nodes: No palpable cervical, supraclavicular, axillary or inguinal lymphadenopathy.    CVS: regular rate and rhythm, no rubs or gallops  RESP: Clear to auscultate bilaterally, no wheezing or crackles.   ABD: Soft, non tender, non distended, positive bowel sounds, no palpable organomegaly  EXT: No edema or cyanosis  CNS: Alert and oriented x3, No focal deficits.  Skin- No rash      Labs:   Recent Labs     07/21/22  1732 " 07/22/22  0718   RBC 1.13* 2.47*   HEMOGLOBIN 3.4* 7.6*   HEMATOCRIT 9.8* 21.7*   PLATELETCT 70* 53*   PROTHROMBTM  --  14.3   INR  --  1.12     Lab Results   Component Value Date/Time    SODIUM 135 07/22/2022 07:18 AM    POTASSIUM 3.9 07/22/2022 07:18 AM    CHLORIDE 105 07/22/2022 07:18 AM    CO2 19 (L) 07/22/2022 07:18 AM    GLUCOSE 117 (H) 07/22/2022 07:18 AM    BUN 13 07/22/2022 07:18 AM    CREATININE 0.84 07/22/2022 07:18 AM        Assessment and Plan:  1.  Febrile neutropenia.  -Started on broad-spectrum antibiotics with cefepime, panculture, chest x-ray negative.      2.  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  FISH testing negative for FLT3, IDH 1-2, T p53.  Positive for CEBPA.   -5/25/22 started  low intensity induction therapy with Dacogen and venetoclax,     3.  Antibiotic prophylaxis with voriconazole and acyclovir  She agreed and verbalized her agreement and understanding with the current plan.  I answered all questions and concerns she has at this time.              Thank you for allowing me to participate in her care.    4.  Pancytopenia  due to AML and therapy related    5.  Severe anemia, transfuse if hemoglobin less than 7 g/dL or bleeding, CMV irradiated blood products    6.  Thrombocytopenia    Plan  -Continue with febrile neutropenia management per protocol, cultures pending,  -Neutropenic precautions  -Transfuse with parameters as stated above  -Discussed with Dr. Tompkins who approved bone marrow biopsy to assess response  -Hold venetoclax  - significant response on the anemia with 1 unit of blood, probably was a lab error.  Check FOBT    High complexity, complex decision making    Please note that this dictation was created using voice recognition software. I have made every reasonable attempt to correct obvious errors, but I expect that there are errors of grammar and possibly content that I did not discover before finalizing the  note.      SIGNATURES:  Freedom Krueger III, M.D.    CC:  Raf Bradley M.D.

## 2022-07-22 NOTE — PROGRESS NOTES
[facility] Internal Medicine Daily Progress Note    Date of Service  7/22/2022    [facility] Team: R IM Green Team   Attending: Quinton Taveras M.d.  Senior Resident: Dr. Brandt  Intern:  Dr. Matson  Contact Number: [phone]    Chief Complaint  Fever, nausea, malaise    Hospital Course   66-year-old female with a past medical history of acute AML leukemia and chronic migraines presented to emergency department on 7/21/2022 with a 3 day history of on and off subjective fevers and spiking a 101.7 fever yesterday.  Patient associates symptoms with lower abdominal pain which is triggered and worsened 1 hour after meals as well as 3 to 4-day constipation.  She still passing gas.        At the emergency department stable vitals and patient saturating well on room air.  CBC with leukopenia, ANC at 0.01, normocytic anemia with hemoglobin at 3.4 and thrombocytopenia at 70.  Chemistry with mild hyponatremia and anion gap metabolic acidosis.  Alkaline phosphatase 107.  Viral panel negative.  Lactic acid, troponin and lipase low.  Urinalysis without signs of infection.  CRP 14.8.  Chest x-ray with no acute cardiopulmonary abnormalities.  Abdominal/pelvic CT with hepatomegaly and colonic diverticulosis with increase amount of stool in the colon.  Right upper quadrant ultrasound with hepatic steatosis otherwise normal.  Blood samples were collected for culture and patient started on IV cefepime.  Patient was admitted for neutropenic fever requiring IV cefepime and further work-up with head CT along with bone marrow biopsy.       Interval Problem Update  Patient seen and examined at bedside.  Discussed plan with Dr. Krueger, patient had bone marrow biopsy this morning.  We will continue IV antibiotics and monitor closely clinically.  Watch for any signs of meningitis currently no signs.    I have discussed this patient's plan of care and discharge plan at IDT rounds today with Case Management, Nursing, Nursing leadership, and other  members of the IDT team.    Consultants/Specialty  oncology    Code Status  Full Code    Disposition  Patient is not medically cleared for discharge.   Anticipate discharge to to home with close outpatient follow-up.  I have placed the appropriate orders for post-discharge needs.    Review of Systems  Review of Systems   Constitutional: Positive for chills, fever and malaise/fatigue.   HENT: Negative for ear pain and tinnitus.    Eyes: Negative for blurred vision, double vision and pain.   Cardiovascular: Negative for chest pain and palpitations.   Gastrointestinal: Negative for abdominal pain, nausea and vomiting.   Genitourinary: Negative for dysuria and urgency.   Musculoskeletal: Negative for back pain, joint pain and neck pain.   Neurological: Negative for dizziness, sensory change, speech change, focal weakness and headaches.   Psychiatric/Behavioral: The patient is not nervous/anxious.         Physical Exam  Temp:  [36.1 °C (97 °F)-38.1 °C (100.5 °F)] 36.8 °C (98.2 °F)  Pulse:  [67-87] 71  Resp:  [12-22] 18  BP: (121-151)/(50-77) 137/50  SpO2:  [91 %-97 %] 94 %    Physical Exam  Constitutional:       General: She is not in acute distress.     Appearance: She is not toxic-appearing.   HENT:      Head: Normocephalic and atraumatic.      Right Ear: External ear normal.      Left Ear: External ear normal.      Mouth/Throat:      Pharynx: Oropharynx is clear.   Eyes:      Extraocular Movements: Extraocular movements intact.      Pupils: Pupils are equal, round, and reactive to light.   Cardiovascular:      Rate and Rhythm: Normal rate and regular rhythm.      Pulses: Normal pulses.   Pulmonary:      Effort: Pulmonary effort is normal.   Abdominal:      General: Abdomen is flat. Bowel sounds are normal.      Palpations: Abdomen is soft.      Comments: Negative Perez's sign   Musculoskeletal:      Right lower leg: No edema.      Left lower leg: No edema.   Skin:     General: Skin is warm.   Neurological:       General: No focal deficit present.      Mental Status: She is alert and oriented to person, place, and time.   Psychiatric:         Mood and Affect: Mood normal.         Behavior: Behavior normal.         Thought Content: Thought content normal.         Judgment: Judgment normal.         Fluids    Intake/Output Summary (Last 24 hours) at 7/22/2022 0515  Last data filed at 7/21/2022 2336  Gross per 24 hour   Intake 400 ml   Output --   Net 400 ml       Laboratory  Recent Labs     07/21/22  1732   WBC 0.8*   RBC 1.13*   HEMOGLOBIN 3.4*   HEMATOCRIT 9.8*   MCV 86.7   MCH 30.1   MCHC 34.7   RDW 43.4   PLATELETCT 70*   MPV 10.1     Recent Labs     07/21/22  1732   SODIUM 132*   POTASSIUM 3.8   CHLORIDE 101   CO2 19*   GLUCOSE 188*   BUN 17   CREATININE 1.08   CALCIUM 9.4                   Imaging  CT-HEAD W/O   Final Result         1.  No acute intracranial abnormality.         CT-ABDOMEN-PELVIS W/O   Final Result      1.  No acute abnormality within the abdomen or pelvis      2.  Hepatomegaly      3.  Colonic diverticulosis and there is increase in expected amount of stool within the colon      US-RUQ   Final Result      1.  Echogenic liver consistent with hepatic steatosis      2.  Negative for gallstones or biliary dilation      DX-CHEST-PORTABLE (1 VIEW)   Final Result      No acute cardiopulmonary abnormality identified.           CT-HEAD W/O   Final Result         1.  No acute intracranial abnormality.         CT-ABDOMEN-PELVIS W/O   Final Result      1.  No acute abnormality within the abdomen or pelvis      2.  Hepatomegaly      3.  Colonic diverticulosis and there is increase in expected amount of stool within the colon      US-RUQ   Final Result      1.  Echogenic liver consistent with hepatic steatosis      2.  Negative for gallstones or biliary dilation      DX-CHEST-PORTABLE (1 VIEW)   Final Result      No acute cardiopulmonary abnormality identified.           Assessment/Plan  Problem Representation:    *  Neutropenic fever (HCC)- (present on admission)  Assessment & Plan  No prior micro results available.  Patient denies any prior neutropenic fever/infection history  Patient presents with fever of 101.7  Will hold chemo, given neutropenic fever  Unclear source, as patient is chest x-ray normal, and no dysuria, no signs of skin infection. Imaging with diverticulosis not diverticulitis.  Large stool burden  PLAN:  Continue bowel protocol  Currently no signs of meningitis clinically, monitor closely if needed consider LP  IV cefipeme  daily CBC with differential    Pancytopenia (HCC)- (present on admission)  Assessment & Plan  Patient presents with pancytopenia, likely secondary to chemo  Anemia with hemoglobin 3.4  Thrombocytopenia with platelets 70  Leukopenia with white blood cell count 0.8  S/p 1 unit pRBCs  Plan:  Goal Hgb >7.0, Plts >20  Serial H/H q12hrs  Neutropenic precautions    Hypertension- (present on admission)  Assessment & Plan  Continue home meds    Hypothyroid- (present on admission)  Assessment & Plan  Continue home meds    Type 2 diabetes mellitus with hyperglycemia, with long-term current use of insulin (HCC)- (present on admission)  Assessment & Plan  On lantus to 100 mg BID with carb counting  Held lantus due to NPO status  BS currently stable  Monitor closely  ISS/hypoglycemia protocol    AML (acute myelogenous leukemia) (HCC)- (present on admission)  Assessment & Plan  Venetoclax and decitabine  Prophylactic fungal and antiviral meds  Patient follows with Dr. [facility] outpatient.  Oncology following inpatient       VTE prophylaxis: pharmacologic prophylaxis contraindicated due to pancytopenia    I have performed a physical exam and reviewed and updated ROS and Plan today (7/22/2022). In review of yesterday's note (7/21/2022), there are no changes except as documented above.

## 2022-07-22 NOTE — ASSESSMENT & PLAN NOTE
On lantus to 100 mg BID with carb counting  Held lantus due to NPO status  BS currently stable  Monitor closely  ISS/hypoglycemia protocol

## 2022-07-22 NOTE — HOSPITAL COURSE
66-year-old female with a past medical history of acute AML leukemia and chronic migraines presented to emergency department on 7/21/2022 with a 3 day history of on and off subjective fevers and spiking a 101.7 fever yesterday.  Patient associates symptoms with lower abdominal pain which is triggered and worsened 1 hour after meals as well as 3 to 4-day constipation.  She still passing gas.        At the emergency department stable vitals and patient saturating well on room air.  CBC with leukopenia, ANC at 0.01, normocytic anemia with hemoglobin at 3.4 and thrombocytopenia at 70.  Chemistry with mild hyponatremia and anion gap metabolic acidosis.  Alkaline phosphatase 107.  Viral panel negative.  Lactic acid, troponin and lipase low.  Urinalysis without signs of infection.  CRP 14.8.  Chest x-ray with no acute cardiopulmonary abnormalities.  Abdominal/pelvic CT with hepatomegaly and colonic diverticulosis with increase amount of stool in the colon.  Right upper quadrant ultrasound with hepatic steatosis otherwise normal.  Blood samples were collected for culture and patient started on IV cefepime.  Patient was admitted for neutropenic fever requiring IV cefepime and further work-up with head CT along with bone marrow biopsy.    During admission, pt resulted FOBT+ but patient denies visible blood in stool. She had no signs of infection on exam and no documented fever for at least 48 hours before discharge date. There was potential concern for lost central access but IR confirmed appropriate position and placement via venogram. Patient's most recent labs show pt is still severely neutropenic and a hgb of 7.2 but is otherwise stable and back to her relative baseline. Prelim culture results are no growth to date from outside facility. S/p transfusion of pRBC x1 as recommended by heme/onc (Dr. Krueger), pt is ok to be discharged this afternoon.    Close follow-up with her outpatient hematology/oncology team was  recommended to patient and she is agreeable to this plan.

## 2022-07-22 NOTE — OR NURSING
Assumed care of patient in pre-op.   Consents for surgery signed.   Pre-op checklist complete.   PIV in place; flushed infusing.   EKG ordered.  Belongings left in patient's room   Call light in reach. Safety precautions in place.

## 2022-07-22 NOTE — PROGRESS NOTES
4 Eyes Skin Assessment Completed by Marge OBRIEN RN and Kristie OBRIEN RN.    Head WDL  Ears WDL  Nose WDL  Mouth WDL  Neck WDL  Breast/Chest WDL  Shoulder Blades WDL  Spine WDL  (R) Arm/Elbow/Hand WDL  (L) Arm/Elbow/Hand WDL  Abdomen WDL  Groin WDL  Scrotum/Coccyx/Buttocks WDL  (R) Leg WDL  (L) Leg WDL  (R) Heel/Foot/Toe WDL  (L) Heel/Foot/Toe WDL          Devices In Places PIV      Interventions In Place Pillows    Possible Skin Injury No    Pictures Uploaded Into Epic N/A  Wound Consult Placed N/A  RN Wound Prevention Protocol Ordered No

## 2022-07-22 NOTE — ED NOTES
Lab called with critical result of Hemoglobin 3.4, Hct 9.8, WBC 0.8 at 1850. Dr. Banuelos notified of critical lab result.

## 2022-07-22 NOTE — ASSESSMENT & PLAN NOTE
- Venetoclax and decitabine  - Prophylactic fungal and antiviral meds  - Patient follows with Dr. [facility] (outpatient)    - Oncology (Dr. Krueger) following inpatient: rec's as follows:       -continue on cefepime, neutropenic precautions       -pending final culture results and bone marrow biopsy       -Get cultures obtained in the outpatient hospital perform on Thursday       -Continue holding on venetoclax       -Follow-up with Dr. Treviño in the outpatient setting to continue on therapy and discuss bone marrow biopsy results

## 2022-07-22 NOTE — CARE PLAN
The patient is Stable - Low risk of patient condition declining or worsening    Shift Goals  Clinical Goals: bone biopsy/ stool sample  Patient Goals: eat  Family Goals: Not present    Progress made toward(s) clinical / shift goals:  Patient went down for bone marrow biopsy today and has a bandage on her right upper hip, clean, dry and intact. Patient is aware that a stool sample is needed, hat is in place for sample collection.   Patient has resumed diet.     Patient is not progressing towards the following goals: N/A    Patient is A&O x4 and understands plan of care, all questions answered at this time. Patient calls appropriately for nursing needs.

## 2022-07-22 NOTE — ASSESSMENT & PLAN NOTE
Patient presents with pancytopenia, likely secondary to chemo  Anemia with hemoglobin 3.4  Thrombocytopenia with platelets 70  Leukopenia with white blood cell count 0.8  S/p 1 unit pRBCs  Plan:  Goal Hgb >7.0, Plts >20  Serial H/H q12hrs  Neutropenic precautions

## 2022-07-22 NOTE — ED PROVIDER NOTES
ED Provider Note    Scribed for Lillian Estrada M.D. by Bernice Hutchison. 7/21/2022  5:10 PM    Primary care provider: Raf Bradley M.D.  Means of arrival: Walk-in  History obtained from: Patient   History limited by: None     CHIEF COMPLAINT  Chief Complaint   Patient presents with   • Sent by MD     Sent by oncologist Dr. Treviño for further workup and admit. Has bone marrow biposy scheduled tomorrow. Hx leukemia. On oral chemo, starting infusions monday   • Fever     X1 week. Seen in [place] ED yesterday, unable to find source   • Nausea   • Malaise     Decreased PO intake the last few days     HPI  Zena Myers is a 66 y.o. female who presents to the Emergency Department for a fever Tmax 101.7 F onset yesterday.  She reports fevers on and off over the last 3 days.  The patient has a history of acute AML leukemia and chronic migraines. She reports that she was on IV chemotherapy, but finished 3 weeks ago. She has been on oral chemotherapy since May, which she is still currently continuing. The patient denies radiation therapy. The patient has symptoms of nausea, abdominal pain, sore throat (onset last week), constipation, cough, headache, and neck stiffness. The patient notes that she has a port in her right chest.  She denies any soreness, pain or redness around the port.  She complained of a migraine headache yesterday.  She was seen at [facility] for her symptoms and was given Imitrex and Toradol with mild alleviation. She denies symptoms of diarrhea, rhinorrhea, chest pain, coughing up sputum, rashes, skin lesions or abscesses, difficulty swallowing fluids or saliva, new shortness of breath, dental issues, back pain, she complains of nausea and decreased appetite over the last couple days since developing the fever.  Dysuria, increased urinary frequency, foul smelling urine, or cloudy urine. The patient states that she tested negative for COVID yesterday and has had 3 booster shoots of the  COVID vaccine. The patient denies a history of a cholecystectomy or apendectomy.     REVIEW OF SYSTEMS  Pertinent positives include fever, nausea, abdominal pain, sore throat (onset last week), constipation, cough, headache, and neck stiffness.   Pertinent negatives include no diarrhea, rhinorrhea, chest pain, coughing up sputum, rashes, skin blemishes, dysphagia, new shortness of breath, dental issues, pain around the kidneys, dysuria, increased urinary frequency, foul smelling urine, or cloudy urine.   See HPI for further details. All other systems are negative.    PAST MEDICAL HISTORY  Past Medical History:   Diagnosis Date   • Acute nasopharyngitis 01/15/2022    negative for covid   • Anemia    • Anesthesia     woke up aggrestive   • Arthritis    • Breath shortness    • Bronchitis 2010   • Cancer (HCC)     AML   • Cough    • Diabetes (HCC)    • Disorder of thyroid     thyroid removed   • Heart burn    • Hiatus hernia syndrome     small   • High cholesterol    • Hypertension    • Indigestion    • Psychiatric problem     depression   • Renal disorder    • Urinary incontinence      FAMILY HISTORY  History reviewed. No pertinent family history.    SOCIAL HISTORY  Social History     Tobacco Use   • Smoking status: Never Smoker   • Smokeless tobacco: Never Used   Vaping Use   • Vaping Use: Never used   Substance Use Topics   • Alcohol use: Yes     Comment: occasionally   • Drug use: No      Social History     Substance and Sexual Activity   Drug Use No     SURGICAL HISTORY  Past Surgical History:   Procedure Laterality Date   • AR DX BONE MARROW ASPIRATIONS Left 4/19/2022    Procedure: ASPIRATION, BONE MARROW;  Surgeon: Luis A Snider M.D.;  Location: SURGERY SAME DAY [facility];  Service: Orthopedics   • AR DX BONE MARROW BIOPSIES Left 4/19/2022    Procedure: BIOPSY, BONE MARROW, USING NEEDLE OR TROCAR - DR. CARREON;  Surgeon: Luis A Snider M.D.;  Location: SURGERY SAME DAY [facility];  Service: Orthopedics   •  "THYROIDECTOMY TOTAL  2005   • COLONOSCOPY       CURRENT MEDICATIONS  Home Medications     Reviewed by Anjelica Molina PhT (Pharmacy Tech) on 07/21/22 at 2055  Med List Status: Complete   Medication Last Dose Status   acetaminophen (TYLENOL) 325 MG Tab 7/20/2022 Active   acyclovir (ZOVIRAX) 200 MG Cap 7/20/2022 Active   baclofen (LIORESAL) 10 MG Tab 7/20/2022 Active   buPROPion (WELLBUTRIN SR) 200 MG SR tablet 7/20/2022 Active   fenofibrate (TRICOR) 145 MG Tab 7/20/2022 Active   gabapentin (NEURONTIN) 100 MG Cap 7/20/2022 Active   insulin glargine (LANTUS) 100 UNIT/ML Solution 7/21/2022 Active   levothyroxine (SYNTHROID) 175 MCG Tab 7/20/2022 Active   losartan-hydrochlorothiazide (HYZAAR) 100-12.5 MG per tablet 7/20/2022 Active   ondansetron (ZOFRAN ODT) 4 MG TABLET DISPERSIBLE PRN Active   prochlorperazine (COMPAZINE) 10 MG Tab PRN Active   SUMAtriptan (IMITREX) 25 MG Tab tablet PRN Active   venetoclax (VENCLEXTA) 100 MG tablet 7/20/2022 Active   voriconazole (VFEND) 200 MG Tab 7/21/2022 Active   zonisamide (ZONEGRAN) 100 MG Cap 7/20/2022 Active   zonisamide (ZONEGRAN) 50 MG capsule 7/20/2022 Active              ALLERGIES  Allergies   Allergen Reactions   • Clindamycin Rash and Swelling     rash   • Pcn [Penicillins]      Family history     PHYSICAL EXAM  VITAL SIGNS: /63   Pulse 78   Temp 36.4 °C (97.5 °F) (Temporal)   Resp 14   Ht 1.651 m (5' 5\")   Wt 104 kg (230 lb)   LMP 04/15/2005   SpO2 94%   BMI 38.27 kg/m²      Constitutional: Elevated BMI; No acute distress; Non-toxic appearance.   HENT: Poor dentition throughout, nontender teeth with percussion.  Normocephalic, atraumatic; Bilateral external ears normal; Oropharynx with moist mucous membranes; Slight asymmetry of structure in the right post oral pharynx without erythema or exudates.  No drooling.  No stridor.  No trismus.  Eyes: PERRL, EOMI, Conjunctiva normal. No discharge.   Neck:  Supple, nontender midline; No stridor; No nuchal " rigidity.   Lymphatic: No cervical lymphadenopathy noted.   Cardiovascular: Regular rate and rhythm without murmurs, rubs, or gallop.   Thorax & Lungs: Nontender port of the right upper chest wall without erythema or induration. No respiratory distress, breath sounds clear to auscultation bilaterally without wheezing, rales or rhonchi. No crepitus or subcutaneous air  Abdomen: Tenderness to the epigastric and right upper quadrant regions. Soft, bowel sounds normal. No obvious masses; No pulsatile masses; no rebound, guarding, or peritoneal signs.   Skin: Good color; warm and dry without rash or petechia.  Back: Nontender, No CVA tenderness.   Extremities: Distal radial, dorsalis pedis, posterior tibial pulses are equal bilaterally; No edema; Nontender calves or saphenous, No cyanosis, No clubbing.   Musculoskeletal: Good range of motion in all major joints. No tenderness to palpation or major deformities noted.   Neurologic: Alert & oriented x 4, clear speech.     LABS/RADIOLOGY/PROCEDURES  Results for orders placed or performed during the hospital encounter of 07/21/22   CBC With Differential   Result Value Ref Range    WBC 0.8 (LL) 4.8 - 10.8 K/uL    RBC 1.13 (L) 4.20 - 5.40 M/uL    Hemoglobin 3.4 (LL) 12.0 - 16.0 g/dL    Hematocrit 9.8 (LL) 37.0 - 47.0 %    MCV 86.7 81.4 - 97.8 fL    MCH 30.1 27.0 - 33.0 pg    MCHC 34.7 33.6 - 35.0 g/dL    RDW 43.4 35.9 - 50.0 fL    Platelet Count 70 (L) 164 - 446 K/uL    MPV 10.1 9.0 - 12.9 fL    Neutrophils-Polys 1.00 (L) 44.00 - 72.00 %    Lymphocytes 95.00 (H) 22.00 - 41.00 %    Monocytes 4.00 0.00 - 13.40 %    Eosinophils 0.00 0.00 - 6.90 %    Basophils 0.00 0.00 - 1.80 %    Nucleated RBC 0.00 /100 WBC    Neutrophils (Absolute) 0.01 (LL) 2.00 - 7.15 K/uL    Lymphs (Absolute) 0.76 (L) 1.00 - 4.80 K/uL    Monos (Absolute) 0.03 0.00 - 0.85 K/uL    Eos (Absolute) 0.00 0.00 - 0.51 K/uL    Baso (Absolute) 0.00 0.00 - 0.12 K/uL    NRBC (Absolute) 0.00 K/uL    Anisocytosis 1+      Macrocytosis 1+     Microcytosis 1+    Comp Metabolic Panel   Result Value Ref Range    Sodium 132 (L) 135 - 145 mmol/L    Potassium 3.8 3.6 - 5.5 mmol/L    Chloride 101 96 - 112 mmol/L    Co2 19 (L) 20 - 33 mmol/L    Anion Gap 12.0 7.0 - 16.0    Glucose 188 (H) 65 - 99 mg/dL    Bun 17 8 - 22 mg/dL    Creatinine 1.08 0.50 - 1.40 mg/dL    Calcium 9.4 8.5 - 10.5 mg/dL    AST(SGOT) 28 12 - 45 U/L    ALT(SGPT) 27 2 - 50 U/L    Alkaline Phosphatase 107 (H) 30 - 99 U/L    Total Bilirubin 0.5 0.1 - 1.5 mg/dL    Albumin 3.8 3.2 - 4.9 g/dL    Total Protein 7.0 6.0 - 8.2 g/dL    Globulin 3.2 1.9 - 3.5 g/dL    A-G Ratio 1.2 g/dL   Lipase   Result Value Ref Range    Lipase 14 11 - 82 U/L   Lactic Acid   Result Value Ref Range    Lactic Acid 1.1 0.5 - 2.0 mmol/L   Urinalysis    Specimen: Urine   Result Value Ref Range    Color DK Yellow     Character Cloudy (A)     Specific Gravity 1.023 <1.035    Ph 5.5 5.0 - 8.0    Glucose Negative Negative mg/dL    Ketones Trace (A) Negative mg/dL    Protein Negative Negative mg/dL    Bilirubin Negative Negative    Urobilinogen, Urine 1.0 Negative    Nitrite Negative Negative    Leukocyte Esterase Negative Negative    Occult Blood Negative Negative    Micro Urine Req Microscopic    Magnesium   Result Value Ref Range    Magnesium 2.1 1.5 - 2.5 mg/dL   C Reactive Protein Quantitative (Non-Cardiac)   Result Value Ref Range    Stat C-Reactive Protein 14.80 (H) 0.00 - 0.75 mg/dL   Troponin   Result Value Ref Range    Troponin T 12 6 - 19 ng/L   CoV-2, FLU A/B, and RSV by PCR (2-4 Hours CEPHEID) : Collect NP swab in VTM    Specimen: Respirate   Result Value Ref Range    Influenza virus A RNA Negative Negative    Influenza virus B, PCR Negative Negative    RSV, PCR Negative Negative    SARS-CoV-2 by PCR NotDetected     SARS-CoV-2 Source NP Swab    URINE MICROSCOPIC (W/UA)   Result Value Ref Range    WBC 0-2 /hpf    RBC 0-2 /hpf    Bacteria Negative None /hpf    Epithelial Cells Negative /hpf     Epithelial Cells Renal Negative /hpf    Urine Other See comment    ESTIMATED GFR   Result Value Ref Range    GFR (CKD-EPI) 56 (A) >60 mL/min/1.73 m 2   COD (ADULT)   Result Value Ref Range    ABO Grouping Only A     Rh Grouping Only POS     Antibody Screen-Cod NEG     Component R       RI                  RedBloodCellsIRR    [id number]   transfused   07/21/22   21:07      Product Type Red Blood Cells IRR LR     Dispense Status transfused     Unit Number (Barcoded) [id number]     Product Code (Barcoded) B4094T48     Blood Type (Barcoded) 6200    DIFFERENTIAL MANUAL   Result Value Ref Range    Manual Diff Status PERFORMED    PERIPHERAL SMEAR REVIEW   Result Value Ref Range    Peripheral Smear Review see below    PLATELET ESTIMATE   Result Value Ref Range    Plt Estimation Decreased    MORPHOLOGY   Result Value Ref Range    RBC Morphology Present     Poikilocytosis 1+     Ovalocytes 1+      CT-ABDOMEN-PELVIS W/O   Final Result      1.  No acute abnormality within the abdomen or pelvis      2.  Hepatomegaly      3.  Colonic diverticulosis and there is increase in expected amount of stool within the colon      US-RUQ   Final Result      1.  Echogenic liver consistent with hepatic steatosis      2.  Negative for gallstones or biliary dilation      DX-CHEST-PORTABLE (1 VIEW)   Final Result      No acute cardiopulmonary abnormality identified.        COURSE & MEDICAL DECISION MAKING  Pertinent Labs & Imaging studies reviewed. (See chart for details)    Reviewed patient's old medical records which showed a history of leukemia and oral chemotherapy. The patient was sent in for admission by Dr. Treviño (Oncology).     5:10 PM - Patient seen and examined at bedside. Ordered for labs and radiology to evaluate her symptoms.     8:19 PM - Paged Internal Medicine.     8:27 PM - I discussed the patient's case and the above findings with Dr. Krueger (oncologist) who agrees with admission for neutropenic fever. He states the  "patient should get a transfusion secondary to her anemia with 2 units tonight, a rest, then one more unit tomorrow. He recommends a bone biopsy tomorrow and states the patient should stop her oral chemo therapeutic agent. He states he will see the patient tomorrow. I also spoke with Dr. Orourke, [facility] internal medicine resident,  at this time and she will kindly evaluate the patient for hospitalization.    8:58 PM - I reevaluated the patient at bedside. I informed her that her white blood count is very low today. I informed the patient of my plan to admit today given the patient's current presentation and diagnostic study results. Patient verbalizes understanding and support with my plan for admission.       Patient presents to the ER at the request of Dr. Treviño, her oncologist, for admission and work-up for neutropenic fever.  The patient has history of AML leukemia.  Was diagnosed in May.  She was on IV chemotherapy up until 3 weeks ago and is currently on oral chemotherapy.  Couple days ago she developed a fever.  T-max is 101.  She was seen at [facility] yesterday, had a work-up and was discharged home.  When she spoke with Dr. Treviño today, he told her to come to the ER at [facility] for admission.  She does not have any complaint of chest pain or abdominal pain.  No back pain.  No urinary symptoms.  She has a cough but denies phlegm production.  Chest x-ray is negative.  No evidence for pneumonia.  She complains of a \"migraine headache.\"  She says that the same headache as she usually gets when she gets a typical migraine.  They treated her with Imitrex and Toradol last night and it seemed to help.  I gave her Compazine today for her nausea and her headache.  No nuchal rigidity.  I do think she needs a spinal tap at this time since she says the headache is exactly like her previous migraine headaches.  Patient complained of nausea and decreased appetite.  She was tender to palpation in the midepigastrium " and right upper quadrant.  She still has a gallbladder.  Gallbladder ultrasound was negative for any acute cholecystitis.  The patient underwent CT scan of the abdomen pelvis.  No evidence of colitis, diverticulitis, appendicitis, or any other explanation for patient's fever.  Urine is clear.  No evidence of UTI.  She is COVID-negative.  C-reactive protein is elevated at 14.  Patient is markedly anemic with hemoglobin of 3.4.  It was 7 a month ago.  She is also very neutropenic with a white count of 0.8 and an ANC of 0.01.  I spoke with Dr. Krueger, oncologist on-call for Dr. Treviño.  He recommends admission for neutropenic fever.  The patient was given cefepime for neutropenic fever.  She was also typed and crossed for packed red blood cells.  She will be transfused.  Oncology still wants her to proceed with bone marrow biopsy tomorrow.  Oncology will see her in the morning.  I spoke with the [facility] internal medicine resident and she will kindly evaluate the patient for hospitalization.    CRITICAL CARE  The very real possibilty of a deterioration of this patient's condition required the highest level of my preparedness for sudden, emergent intervention.  I provided critical care services, which included medication orders, frequent reevaluations of the patient's condition and response to treatment, ordering and reviewing test results, and discussing the case with various consultants.  The critical care time associated with the care of the patient was 35 minutes. Review chart for interventions. This time is exclusive of any other billable procedures.     DISPOSITION:  Patient will be hospitalized by [facility] Internal Medicine  in guarded condition.    FINAL IMPRESSION  1. Neutropenic fever (HCC) Acute   2. Anemia, unspecified type Acute    The critical care time associated with the care of the patient was 35 minutes.     Bernice NICOLE (Scribarnaldo), am scribing for, and in the presence of, Lillian Estrada,  M.D..    Electronically signed by: Bernice Hutchison (Scribe), 7/21/2022    ILillian M.D. personally performed the services described in this documentation, as scribed by Bernice Hutchison in my presence, and it is both accurate and complete.    This dictation has been created using voice recognition software. The accuracy of the dictation is limited by the abilities of the software. I expect there may be some errors of grammar and possibly content. I made every attempt to manually correct the errors within my dictation. However, errors related to voice recognition software may still exist and should be interpreted within the appropriate context.    The note accurately reflects work and decisions made by me.  Lillian Estrada M.D.  7/22/2022  12:54 AM

## 2022-07-22 NOTE — OR NURSING
1206 Pt to PACU from OR. Report from anesthesia and OR RN. Respirations even and unlabored. VSS. Dressing CDI. Patient has no complaints of pain or nausea at this time.    1218 Report called to patient's bedside RN, Roseann.    1305 Patient taken back to room via gurney with all belongings.

## 2022-07-22 NOTE — NON-PROVIDER
This note is intended for the purposes of medical student education and feedback only.   Please refer to the documentation by this patient's assigned medical practitioner for details of care and plans.    Medical Student Admitting History and Physical  Note Author: Douglas Zamora, Student MS3  Date: 7/22/2022    Date of Admission: 7/21/2022  Primary Team: [facility] Purple/Green Team  Attending: Dr. Taveras  Senior Resident:  Lung  Intern: Dr. Matson  Medical Student: Douglas Zamora MS3      ID/CC: Zena Myers is a 66 y.o. female with a PMH of AML diagnosed in May 2022 who was admitted on 7/21/2022 with neutropenic fever.    SUBJECTIVE  HPI  Zena Myers is a 66 y.o. female with a PMH of AML diagnosed in May 2022 who was admitted on 7/21/2022 with neutropenic fever of 102F associated with headaches, lightheadedness, fatigue, nausea, and a single episode of vomiting. Pt is being treated with venetoclax and decitabine for her AML and voriconazole and acyclovir prophylactically for her neutropenia (0.6 WBC, 100% lymphocytes). Pt reports Sx having improved overnight w/o Sx of HA, lightheadedness, fatigue, and N/V.    ROS  General: headache, fatigue, difficulty sleeping, fevers, chills, night sweats  HEENT: denies change in vision, change in hearing pain or difficulty swallowing  Pulm: denies SOB, Hx of chronic cough for the past couple years - exacerbated by talking only  CV: denies rapid, slow or irregular heart beat (palpitations), chest pain  GI: denies heartburn, abdominal pain, constipation, diarrhea  : denies urinary incontinence, increased urinary urgency/frequency    PMHx   has a past medical history of Acute nasopharyngitis (01/15/2022), Anemia, Anesthesia, Arthritis, Breath shortness, Bronchitis (2010), Cancer (HCC), Cough, Diabetes (HCC), Disorder of thyroid, Heart burn, Hiatus hernia syndrome, High cholesterol, Hypertension, Indigestion, Psychiatric problem, Renal disorder, and Urinary  incontinence.  Immunization History   Administered Date(s) Administered   • MODERNA SARS-COV-2 VACCINE (12+) 02/04/2021, 03/03/2021, 10/25/2021, 04/13/2022     Allergies  Allergies   Allergen Reactions   • Clindamycin Rash and Swelling     rash   • Pcn [Penicillins]      Family history       Surgical Hx   has a past surgical history that includes thyroidectomy total (2005); colonoscopy; pr dx bone marrow aspirations (Left, 4/19/2022); and pr dx bone marrow biopsies (Left, 4/19/2022).    Medications:  Prior to Admission Medications   Prescriptions Last Dose Informant Patient Reported? Taking?   Exenatide (BYDUREON SC) 7/20/2022 at >3 DAYS  Yes Yes   Sig: Inject 1 Dose under the skin every 7 days.   SUMAtriptan (IMITREX) 25 MG Tab tablet PRN at PRN Patient No No   Sig: Take 1 Tablet by mouth every 2 hours as needed for Migraine.   acetaminophen (TYLENOL) 325 MG Tab 7/20/2022 at Unknown time  Yes Yes   Sig: Take 650 mg by mouth every four hours as needed.   acyclovir (ZOVIRAX) 200 MG Cap 7/20/2022 at pm Patient No No   Sig: Take 2 Capsules by mouth 2 times a day.   baclofen (LIORESAL) 10 MG Tab 7/20/2022 at UNK Patient Yes No   Sig: Take 10 mg by mouth 4 times a day.   buPROPion (WELLBUTRIN SR) 200 MG SR tablet 7/20/2022 at Unknown time Patient Yes No   Sig: Take 200 mg by mouth every day.   fenofibrate (TRICOR) 145 MG Tab 7/20/2022 at Unknown time Patient Yes No   Sig: Take 145 mg by mouth every day.   gabapentin (NEURONTIN) 100 MG Cap 7/20/2022 at Unknown time Patient Yes No   Sig: Take 100 mg by mouth 2 times a day. prn   insulin glargine (LANTUS) 100 UNIT/ML Solution 7/21/2022 at AM  No No   Sig: Inject 120 Units under the skin 2 times a day.   Patient taking differently: Inject 180 Units under the skin 2 times a day.   levothyroxine (SYNTHROID) 175 MCG Tab 7/20/2022 at AM Patient Yes No   Sig: Take 175 mcg by mouth every morning on an empty stomach.   losartan-hydrochlorothiazide (HYZAAR) 100-12.5 MG per tablet  "7/20/2022 at Unknown time Patient Yes No   Sig: Take 1 Tablet by mouth every day.   ondansetron (ZOFRAN ODT) 4 MG TABLET DISPERSIBLE PRN at PRN Patient No No   Sig: Take 1 Tablet by mouth every 6 hours as needed for Nausea.   prochlorperazine (COMPAZINE) 10 MG Tab PRN at PRN Patient Yes No   Sig: Take 10 mg by mouth every 6 hours as needed.   venetoclax (VENCLEXTA) 100 MG tablet 7/20/2022 at Unknown time Patient No No   Sig: Take 1 Tablet by mouth every day.   voriconazole (VFEND) 200 MG Tab 7/21/2022 at AM Patient No No   Sig: Take 1 Tablet by mouth 2 times a day.   zonisamide (ZONEGRAN) 100 MG Cap 7/20/2022 at Unknown time Patient Yes No   Sig: Take 100 mg by mouth every day. W/50MG CAPSULE   zonisamide (ZONEGRAN) 50 MG capsule 7/20/2022 at Unknown time Patient Yes No   Sig: Take 50 mg by mouth every day. W/100MG CAPSULE      Facility-Administered Medications: None       Family Hx:  History reviewed. No pertinent family history.    Social Hx:  Living Situation: Alone.  Work: [employer] Hx; Currently retired.  Exercise: Pt plays [employer].  Tobacco: No  Marijuana: No  Alcohol: No  Illicit Drugs: No    OBJECTIVE   Physical Exam:  BP (!) 153/59   Pulse 69   Temp 36.8 °C (98.3 °F) (Temporal)   Resp 20   Ht 1.651 m (5' 5\")   Wt 107 kg (235 lb 14.3 oz)   SpO2 96%     Intake/Output Summary (Last 24 hours) at 7/22/2022 1143  Last data filed at 7/21/2022 2336  Gross per 24 hour   Intake 400 ml   Output --   Net 400 ml       General: Well developed, well nourished, female, appears stated age, appears to be in no acute distress.  HEENT: Normocephalic, atraumatic. EOM grossly intact  Cardio: Normal S1 and S2. Regular rate and rhythm. No murmurs, rubs, or gallops.  Pulmonary: Lungs are clear to auscultation bilaterally. No wheezes, rales, or rhonchi.  Abdomen: Normoactive bowel sounds. Abdomen is soft and nondistended. No masses. No tenderness to palpation in all four quadrants. Negative Perez's sign.  Skin: No rash, " "lesions, or skin ulcers. No jaundice, ecchymoses, or petechiae.   Psych: Appropriate mood and affect.    Lab Results:  Recent Labs     07/21/22 1732 07/22/22  0718   WBC 0.8* 0.6*   RBC 1.13* 2.47*   HEMOGLOBIN 3.4* 7.6*   HEMATOCRIT 9.8* 21.7*   MCV 86.7 87.9   MCH 30.1 30.8   RDW 43.4 42.8   PLATELETCT 70* 53*   MPV 10.1 10.3   NEUTSPOLYS 1.00* 0.00*   LYMPHOCYTES 95.00* 100.00*   MONOCYTES 4.00 0.00   EOSINOPHILS 0.00 0.00   BASOPHILS 0.00 0.00   RBCMORPHOLO Present Present     Recent Labs     07/21/22 1732 07/22/22 0718   SODIUM 132* 135   POTASSIUM 3.8 3.9   CHLORIDE 101 105   CO2 19* 19*   BUN 17 13   CREATININE 1.08 0.84   CALCIUM 9.4 9.2   MAGNESIUM 2.1  --    ALBUMIN 3.8 3.1*     Estimated GFR/CRCL = Estimated Creatinine Clearance: 79 mL/min (by C-G formula based on SCr of 0.84 mg/dL).  Recent Labs     07/21/22 1732 07/22/22  0718   GLUCOSE 188* 117*     Recent Labs     07/21/22 1732 07/22/22  0718   ASTSGOT 28 17   ALTSGPT 27 22   TBILIRUBIN 0.5 0.7   ALKPHOSPHAT 107* 94   GLOBULIN 3.2 3.0   INR  --  1.12             Recent Labs     07/22/22 0718   INR 1.12       Microbiology Results:  Results     Procedure Component Value Units Date/Time    S. Aureus By PCR, Nasal Complete [[id number]]     Order Status: No result Specimen: Respirate     Blood Culture [[id number]] Collected: 07/21/22 1732    Order Status: Completed Specimen: Blood from Peripheral Updated: 07/22/22 0753     Significant Indicator NEG     Source BLD     Site PERIPHERAL     Culture Result No Growth  Note: Blood cultures are incubated for 5 days and  are monitored continuously.Positive blood cultures  are called to the RN and reported as soon as  they are identified.      Narrative:      1 of 2 for Blood Culture x 2 sites order. Per Hospital  Policy: Only change Specimen Src: to \"Line\" if specified by  physician order.  No site indicated    Blood Culture [[id number]] Collected: 07/21/22 1843    Order Status: Completed Specimen: Blood from " "Peripheral Updated: 07/22/22 0753     Significant Indicator NEG     Source BLD     Site PERIPHERAL     Culture Result No Growth  Note: Blood cultures are incubated for 5 days and  are monitored continuously.Positive blood cultures  are called to the RN and reported as soon as  they are identified.      Narrative:      2 of 2 blood culture x2  Sites order. Per Hospital Policy:  Only change Specimen Src: to \"Line\" if specified by physician  order.  Right Hand    MRSA By PCR (Amp) [[id number]]     Order Status: No result Specimen: Respirate from Nares     Urinalysis [[id number]]  (Abnormal) Collected: 07/21/22 1759    Order Status: Completed Specimen: Urine Updated: 07/21/22 1858     Color DK Yellow     Character Cloudy     Specific Gravity 1.023     Ph 5.5     Glucose Negative mg/dL      Ketones Trace mg/dL      Protein Negative mg/dL      Bilirubin Negative     Urobilinogen, Urine 1.0     Nitrite Negative     Leukocyte Esterase Negative     Occult Blood Negative     Micro Urine Req Microscopic    Narrative:      Indication for culture:->Evaluation for sepsis without a  clear source of infection    CoV-2, FLU A/B, and RSV by PCR (2-4 Hours CEPHEID) : Collect NP swab in VTM [[id number]] Collected: 07/21/22 1746    Order Status: Completed Specimen: Respirate Updated: 07/21/22 1853     Influenza virus A RNA Negative     Influenza virus B, PCR Negative     RSV, PCR Negative     SARS-CoV-2 by PCR NotDetected     Comment: PATIENTS: Important information regarding your results and instructions can  be found at [email or web address]   \"After your  Covid-19 Test\"    [facility] providers: PLEASE REFER TO DE-ESCALATION AND RETESTING PROTOCOL  on [email or web address]    **The [facility] GeneXpert Xpress SARS-CoV-2 RT-PCR Test has been made  available for use under the Emergency Use Authorization (EUA) only.          SARS-CoV-2 Source NP Swab    Urine Culture (NEW) [[id number]] Collected: 07/21/22 1759    Order Status: " Sent Specimen: Urine, Clean Catch Updated: 07/21/22 1802    Narrative:      Indication for culture:->Evaluation for sepsis without a  clear source of infection          Imaging Results:  CT-HEAD W/O   Final Result         1.  No acute intracranial abnormality.         CT-ABDOMEN-PELVIS W/O   Final Result      1.  No acute abnormality within the abdomen or pelvis      2.  Hepatomegaly      3.  Colonic diverticulosis and there is increase in expected amount of stool within the colon      US-RUQ   Final Result      1.  Echogenic liver consistent with hepatic steatosis      2.  Negative for gallstones or biliary dilation      DX-CHEST-PORTABLE (1 VIEW)   Final Result      No acute cardiopulmonary abnormality identified.          EKG  No results found for this or any previous visit.    Current Medications    Current Facility-Administered Medications:   •  [MAR Hold] senna-docusate (PERICOLACE or SENOKOT S) 8.6-50 MG per tablet 2 Tablet, 2 Tablet, Oral, BID, 2 Tablet at 07/22/22 0906 **AND** [MAR Hold] polyethylene glycol/lytes (MIRALAX) PACKET 1 Packet, 1 Packet, Oral, QDAY PRN **AND** [MAR Hold] magnesium hydroxide (MILK OF MAGNESIA) suspension 30 mL, 30 mL, Oral, QDAY PRN **AND** [MAR Hold] bisacodyl (DULCOLAX) suppository 10 mg, 10 mg, Rectal, QDAY PRN, Mayuri Orourke M.D.  •  [Held by provider] enoxaparin (Lovenox) inj 40 mg, 40 mg, Subcutaneous, DAILY AT 1800, Mayuri Orourke M.D.  •  [MAR Hold] acetaminophen (Tylenol) tablet 650 mg, 650 mg, Oral, Q6HRS PRN, Mayuri Orourke M.D., 650 mg at 07/22/22 0247  •  [MAR Hold] cefepime (Maxipime) 2 g in dextrose 5% 20 mL IV syringe, 2 g, Intravenous, Q8HRS, Mayuri Orourke M.D.  •  [MAR Hold] voriconazole (VFEND) tablet 200 mg, 200 mg, Oral, BID, Mayuri Orourke M.D., 200 mg at 07/22/22 0912  •  [MAR Hold] zonisamide (ZONEGRAN) capsule 150 mg, 150 mg, Oral, DAILY, Mayuri Orourke M.D., 150 mg at 07/22/22 0912  •  [MAR Hold] SUMAtriptan  (IMITREX) tablet 25 mg, 25 mg, Oral, Q2HRS PRN, Mayuri Orourke M.D.  •  [MAR Hold] ondansetron (ZOFRAN ODT) dispertab 4 mg, 4 mg, Oral, Q6HRS PRN, Mayuri Orourke M.D.  •  [MAR Hold] prochlorperazine (COMPAZINE) tablet 10 mg, 10 mg, Oral, Q6HRS PRN, Mayuri Orourke M.D.  •  [MAR Hold] acyclovir (ZOVIRAX) capsule 400 mg, 400 mg, Oral, BID, Mayuri Orourke M.D., 400 mg at 07/22/22 0908  •  [MAR Hold] gabapentin (NEURONTIN) capsule 100 mg, 100 mg, Oral, BID, Mayuri Orourke M.D., 100 mg at 07/22/22 0911  •  [MAR Hold] baclofen (LIORESAL) tablet 10 mg, 10 mg, Oral, 4X/DAY, Mayuri Orourke M.D., 10 mg at 07/22/22 0909  •  [MAR Hold] buPROPion SR (WELLBUTRIN-SR) tablet 200 mg, 200 mg, Oral, DAILY, Mayuri Orourke M.D.  •  [MAR Hold] fenofibrate micronized (LOFIBRA) capsule 134 mg, 134 mg, Oral, DAILY, Mayuri Orourke M.D.  •  [MAR Hold] insulin lispro (AdmeLOG,HumaLOG) injection, 2-9 Units, Subcutaneous, Q6HRS **AND** POC blood glucose manual result, , , Q AC AND BEDTIME(S) **AND** NOTIFY MD and PharmD, , , Once **AND** Administer 20 grams of glucose (approximately 8 ounces of fruit juice) every 15 minutes PRN FSBG less than 70 mg/dL, , , PRN **AND** [MAR Hold] dextrose 50% (D50W) injection 25 g, 25 g, Intravenous, Q15 MIN PRN, Jim Brandt M.D.  •  [MAR Hold] levothyroxine (SYNTHROID) tablet 175 mcg, 175 mcg, Oral, AM ES, Mayuri Orourke M.D., 175 mcg at 07/22/22 0907  •  [MAR Hold] losartan (COZAAR) tablet 100 mg, 100 mg, Oral, Q DAY, Mayuri Orourke M.D., 100 mg at 07/22/22 0910  •  [MAR Hold] hydroCHLOROthiazide (HYDRODIURIL) tablet 12.5 mg, 12.5 mg, Oral, Q DAY, Mayuri Orourke M.D., 12.5 mg at 07/22/22 0910  •  lactated ringers infusion, , Intravenous, Continuous, Luis A Treviño M.D.  •  [MAR Hold] lidocaine-prilocaine (EMLA) 2.5-2.5 % cream, , Topical, PRN, Jim Brandt M.D.  •  NS (BOLUS) infusion 500 mL, 500 mL, Intravenous, Once PRN, Mik Hanson,  M.D.  •  fentaNYL (SUBLIMAZE) injection 12.5-50 mcg, 12.5-50 mcg, Intravenous, PRN, Mik Hanson M.D.  •  midazolam (Versed) injection 0.5-2 mg, 0.5-2 mg, Intravenous, PRN, Mik Hanson M.D.    ASSESSMENT/PLAN    Neutropenic fever (present on admission)  Pt present to clinic with fever of 101.7. Denies Sx of fever now with temperature of 98.3 F. Pt has PMH of AML with WBC of 0.6 (100% lymphocytes). CXR negative. Continue to take acyclovir, voriconazole, and cefepime prophylactically to prevent further infection.    Pancytopenia (present on admission)  WBC 0.6, RBC 2.47, Hb 7.6, Hct 21.7, platelet count 53. Likely secondary to AML. Hb > 7 - no need to transfuse blood at this time. Will continue to monitor CBC and Sx for worsening anemia. Pt denies fatigue, weakness, SOB, CP, HA and any additional Sx of anemia at this time.    HTN  Chronic Dx. BP most recently 132/65 which is the Pt's baseline. Continue to home meds.    Hypothyroidism  Chronic Dx. Continue home meds.    AML  Dx on 5/2022 and f/u with oncologist, Dr. Treviño. Pt currently on decitabine and venetoclax for chemotherapy. Prophylactic acyclovir, voriconazole, and cefepime prescribed as above. Pt to get a bone marrow Bx.    PROPHYLAXIS  • DVT: enoxaparan ppx

## 2022-07-23 LAB
ALBUMIN SERPL BCP-MCNC: 3.2 G/DL (ref 3.2–4.9)
ALBUMIN/GLOB SERPL: 1 G/DL
ALP SERPL-CCNC: 99 U/L (ref 30–99)
ALT SERPL-CCNC: 30 U/L (ref 2–50)
ANION GAP SERPL CALC-SCNC: 9 MMOL/L (ref 7–16)
AST SERPL-CCNC: 30 U/L (ref 12–45)
BACTERIA UR CULT: NORMAL
BASOPHILS # BLD AUTO: 0 % (ref 0–1.8)
BASOPHILS # BLD: 0 K/UL (ref 0–0.12)
BILIRUB SERPL-MCNC: 0.4 MG/DL (ref 0.1–1.5)
BUN SERPL-MCNC: 13 MG/DL (ref 8–22)
CALCIUM SERPL-MCNC: 9.3 MG/DL (ref 8.5–10.5)
CHLORIDE SERPL-SCNC: 107 MMOL/L (ref 96–112)
CO2 SERPL-SCNC: 20 MMOL/L (ref 20–33)
CREAT SERPL-MCNC: 0.86 MG/DL (ref 0.5–1.4)
EOSINOPHIL # BLD AUTO: 0 K/UL (ref 0–0.51)
EOSINOPHIL NFR BLD: 0 % (ref 0–6.9)
ERYTHROCYTE [DISTWIDTH] IN BLOOD BY AUTOMATED COUNT: 45.1 FL (ref 35.9–50)
EST. AVERAGE GLUCOSE BLD GHB EST-MCNC: 160 MG/DL
FERRITIN SERPL-MCNC: >2000 NG/ML (ref 10–291)
GFR SERPLBLD CREATININE-BSD FMLA CKD-EPI: 74 ML/MIN/1.73 M 2
GLOBULIN SER CALC-MCNC: 3.1 G/DL (ref 1.9–3.5)
GLUCOSE BLD STRIP.AUTO-MCNC: 139 MG/DL (ref 65–99)
GLUCOSE BLD STRIP.AUTO-MCNC: 176 MG/DL (ref 65–99)
GLUCOSE BLD STRIP.AUTO-MCNC: 245 MG/DL (ref 65–99)
GLUCOSE SERPL-MCNC: 153 MG/DL (ref 65–99)
HBA1C MFR BLD: 7.2 % (ref 4–5.6)
HCT VFR BLD AUTO: 21.6 % (ref 37–47)
HGB BLD-MCNC: 7.4 G/DL (ref 12–16)
IRON SATN MFR SERPL: 45 % (ref 15–55)
IRON SERPL-MCNC: 110 UG/DL (ref 40–170)
LYMPHOCYTES # BLD AUTO: 0.69 K/UL (ref 1–4.8)
LYMPHOCYTES NFR BLD: 99.1 % (ref 22–41)
MAGNESIUM SERPL-MCNC: 2 MG/DL (ref 1.5–2.5)
MANUAL DIFF BLD: NORMAL
MCH RBC QN AUTO: 30.5 PG (ref 27–33)
MCHC RBC AUTO-ENTMCNC: 34.3 G/DL (ref 33.6–35)
MCV RBC AUTO: 88.9 FL (ref 81.4–97.8)
MONOCYTES # BLD AUTO: 0 K/UL (ref 0–0.85)
MONOCYTES NFR BLD AUTO: 0 % (ref 0–13.4)
MORPHOLOGY BLD-IMP: NORMAL
NEUTROPHILS # BLD AUTO: 0.01 K/UL (ref 2–7.15)
NEUTROPHILS NFR BLD: 0.9 % (ref 44–72)
NRBC # BLD AUTO: 0 K/UL
NRBC BLD-RTO: 0 /100 WBC
PHOSPHATE SERPL-MCNC: 3.1 MG/DL (ref 2.5–4.5)
PLATELET # BLD AUTO: 69 K/UL (ref 164–446)
PLATELET BLD QL SMEAR: NORMAL
PMV BLD AUTO: 9.6 FL (ref 9–12.9)
POTASSIUM SERPL-SCNC: 4.1 MMOL/L (ref 3.6–5.5)
PROT SERPL-MCNC: 6.3 G/DL (ref 6–8.2)
RBC # BLD AUTO: 2.43 M/UL (ref 4.2–5.4)
RBC BLD AUTO: NORMAL
SIGNIFICANT IND 70042: NORMAL
SITE SITE: NORMAL
SODIUM SERPL-SCNC: 136 MMOL/L (ref 135–145)
SOURCE SOURCE: NORMAL
TIBC SERPL-MCNC: 245 UG/DL (ref 250–450)
UIBC SERPL-MCNC: 135 UG/DL (ref 110–370)
VIT B12 SERPL-MCNC: 295 PG/ML (ref 211–911)
WBC # BLD AUTO: 0.7 K/UL (ref 4.8–10.8)

## 2022-07-23 PROCEDURE — A9270 NON-COVERED ITEM OR SERVICE: HCPCS | Performed by: STUDENT IN AN ORGANIZED HEALTH CARE EDUCATION/TRAINING PROGRAM

## 2022-07-23 PROCEDURE — 99233 SBSQ HOSP IP/OBS HIGH 50: CPT | Mod: GC | Performed by: INTERNAL MEDICINE

## 2022-07-23 PROCEDURE — 700102 HCHG RX REV CODE 250 W/ 637 OVERRIDE(OP): Performed by: STUDENT IN AN ORGANIZED HEALTH CARE EDUCATION/TRAINING PROGRAM

## 2022-07-23 PROCEDURE — 700111 HCHG RX REV CODE 636 W/ 250 OVERRIDE (IP): Performed by: STUDENT IN AN ORGANIZED HEALTH CARE EDUCATION/TRAINING PROGRAM

## 2022-07-23 PROCEDURE — 80053 COMPREHEN METABOLIC PANEL: CPT

## 2022-07-23 PROCEDURE — 85007 BL SMEAR W/DIFF WBC COUNT: CPT

## 2022-07-23 PROCEDURE — 83735 ASSAY OF MAGNESIUM: CPT

## 2022-07-23 PROCEDURE — 700102 HCHG RX REV CODE 250 W/ 637 OVERRIDE(OP)

## 2022-07-23 PROCEDURE — 83540 ASSAY OF IRON: CPT

## 2022-07-23 PROCEDURE — 82962 GLUCOSE BLOOD TEST: CPT

## 2022-07-23 PROCEDURE — 82728 ASSAY OF FERRITIN: CPT

## 2022-07-23 PROCEDURE — 770004 HCHG ROOM/CARE - ONCOLOGY PRIVATE *

## 2022-07-23 PROCEDURE — 83036 HEMOGLOBIN GLYCOSYLATED A1C: CPT

## 2022-07-23 PROCEDURE — 84134 ASSAY OF PREALBUMIN: CPT

## 2022-07-23 PROCEDURE — A9270 NON-COVERED ITEM OR SERVICE: HCPCS

## 2022-07-23 PROCEDURE — 83550 IRON BINDING TEST: CPT

## 2022-07-23 PROCEDURE — 700105 HCHG RX REV CODE 258: Performed by: STUDENT IN AN ORGANIZED HEALTH CARE EDUCATION/TRAINING PROGRAM

## 2022-07-23 PROCEDURE — 84100 ASSAY OF PHOSPHORUS: CPT

## 2022-07-23 PROCEDURE — 82607 VITAMIN B-12: CPT

## 2022-07-23 PROCEDURE — 85025 COMPLETE CBC W/AUTO DIFF WBC: CPT

## 2022-07-23 RX ORDER — OMEPRAZOLE 20 MG/1
20 CAPSULE, DELAYED RELEASE ORAL DAILY
Status: DISCONTINUED | OUTPATIENT
Start: 2022-07-23 | End: 2022-07-24 | Stop reason: HOSPADM

## 2022-07-23 RX ADMIN — INSULIN LISPRO 3 UNITS: 100 INJECTION, SOLUTION INTRAVENOUS; SUBCUTANEOUS at 11:46

## 2022-07-23 RX ADMIN — FENOFIBRATE 134 MG: 134 CAPSULE ORAL at 05:13

## 2022-07-23 RX ADMIN — OMEPRAZOLE 20 MG: 20 CAPSULE, DELAYED RELEASE ORAL at 11:50

## 2022-07-23 RX ADMIN — BACLOFEN 10 MG: 10 TABLET ORAL at 12:16

## 2022-07-23 RX ADMIN — BACLOFEN 10 MG: 10 TABLET ORAL at 21:33

## 2022-07-23 RX ADMIN — CEFEPIME 2 G: 2 INJECTION, POWDER, FOR SOLUTION INTRAVENOUS at 13:53

## 2022-07-23 RX ADMIN — ACYCLOVIR 400 MG: 200 CAPSULE ORAL at 05:11

## 2022-07-23 RX ADMIN — GABAPENTIN 100 MG: 100 CAPSULE ORAL at 05:11

## 2022-07-23 RX ADMIN — VORICONAZOLE 200 MG: 200 TABLET ORAL at 16:58

## 2022-07-23 RX ADMIN — CEFEPIME 2 G: 2 INJECTION, POWDER, FOR SOLUTION INTRAVENOUS at 21:33

## 2022-07-23 RX ADMIN — BACLOFEN 10 MG: 10 TABLET ORAL at 08:15

## 2022-07-23 RX ADMIN — BUPROPION HYDROCHLORIDE 200 MG: 100 TABLET, EXTENDED RELEASE ORAL at 05:13

## 2022-07-23 RX ADMIN — ACYCLOVIR 400 MG: 200 CAPSULE ORAL at 16:58

## 2022-07-23 RX ADMIN — LOSARTAN POTASSIUM 100 MG: 50 TABLET, FILM COATED ORAL at 05:10

## 2022-07-23 RX ADMIN — HYDROCHLOROTHIAZIDE 12.5 MG: 25 TABLET ORAL at 05:10

## 2022-07-23 RX ADMIN — BACLOFEN 10 MG: 10 TABLET ORAL at 16:57

## 2022-07-23 RX ADMIN — GABAPENTIN 100 MG: 100 CAPSULE ORAL at 16:58

## 2022-07-23 RX ADMIN — SENNOSIDES AND DOCUSATE SODIUM 2 TABLET: 50; 8.6 TABLET ORAL at 05:11

## 2022-07-23 RX ADMIN — SENNOSIDES AND DOCUSATE SODIUM 2 TABLET: 50; 8.6 TABLET ORAL at 16:58

## 2022-07-23 RX ADMIN — LEVOTHYROXINE SODIUM 175 MCG: 0.12 TABLET ORAL at 05:11

## 2022-07-23 RX ADMIN — VORICONAZOLE 200 MG: 200 TABLET ORAL at 05:10

## 2022-07-23 RX ADMIN — CEFEPIME 2 G: 2 INJECTION, POWDER, FOR SOLUTION INTRAVENOUS at 05:10

## 2022-07-23 RX ADMIN — ZONISAMIDE 150 MG: 50 CAPSULE ORAL at 05:10

## 2022-07-23 ASSESSMENT — ENCOUNTER SYMPTOMS
CHILLS: 0
FEVER: 0
WHEEZING: 0
NAUSEA: 0
MUSCULOSKELETAL NEGATIVE: 1
CONSTIPATION: 0
SPUTUM PRODUCTION: 0
COUGH: 0
PALPITATIONS: 0
WEIGHT LOSS: 0
HEMOPTYSIS: 0
VOMITING: 0
ORTHOPNEA: 0
CLAUDICATION: 0
DIARRHEA: 0
SHORTNESS OF BREATH: 0
EYES NEGATIVE: 1
PSYCHIATRIC NEGATIVE: 1
HEARTBURN: 0
ABDOMINAL PAIN: 0
NEUROLOGICAL NEGATIVE: 1

## 2022-07-23 ASSESSMENT — PAIN DESCRIPTION - PAIN TYPE: TYPE: ACUTE PAIN

## 2022-07-23 NOTE — CARE PLAN
The patient is Stable - Low risk of patient condition declining or worsening    Shift Goals  Clinical Goals: Stable hemoglobin  Patient Goals: Sleep  Family Goals: Not present    Progress made toward(s) clinical / shift goals: Daily monitoring of labs in place, hemoglobin stable. Care clustered to promote rest.     Problem: Knowledge Deficit - Standard  Goal: Patient and family/care givers will demonstrate understanding of plan of care, disease process/condition, diagnostic tests and medications  Outcome: Progressing     Problem: Pain - Standard  Goal: Alleviation of pain or a reduction in pain to the patient’s comfort goal  Outcome: Progressing     Problem: Neutropenia Precautions  Goal: Neutropenic precautions will be implemented and maintained for patient protection  Outcome: Progressing     Problem: Optimal Care for the Febrile Neutropenia Patient  Goal: Patient will avoid the development of an infection by achieving a neutrophil count within normal range  Outcome: Progressing     Problem: Risk for Impaired Oral Mucous Membranes  Goal: Patient's oral mucus membranes will remain pink, moist, and free of inflammation/ulcerations or improve  Outcome: Progressing     Problem: Communication  Goal: The ability to communicate needs accurately and effectively will improve  Outcome: Progressing     Problem: Discharge Barriers/Planning  Goal: Patient's continuum of care needs are met  Outcome: Progressing     Problem: Mobility  Goal: Patient's capacity to carry out activities will improve  Outcome: Progressing     Problem: Self Care  Goal: Patient will have the ability to perform ADLs independently or with assistance (bathe, groom, dress, toilet and feed)  Outcome: Progressing     Problem: Infection - Standard  Goal: Patient will remain free from infection  Outcome: Progressing       Patient is not progressing towards the following goals:

## 2022-07-23 NOTE — CARE PLAN
Problem: Knowledge Deficit - Standard  Goal: Patient and family/care givers will demonstrate understanding of plan of care, disease process/condition, diagnostic tests and medications  Outcome: Progressing     Problem: Pain - Standard  Goal: Alleviation of pain or a reduction in pain to the patient’s comfort goal  Outcome: Progressing     Problem: Neutropenia Precautions  Goal: Neutropenic precautions will be implemented and maintained for patient protection  Outcome: Progressing     Problem: Optimal Care for the Febrile Neutropenia Patient  Goal: Patient will avoid the development of an infection by achieving a neutrophil count within normal range  Outcome: Progressing     Problem: Risk for Impaired Oral Mucous Membranes  Goal: Patient's oral mucus membranes will remain pink, moist, and free of inflammation/ulcerations or improve  Outcome: Progressing     Problem: Communication  Goal: The ability to communicate needs accurately and effectively will improve  Outcome: Progressing     Problem: Discharge Barriers/Planning  Goal: Patient's continuum of care needs are met  Outcome: Progressing     Problem: Mobility  Goal: Patient's capacity to carry out activities will improve  Outcome: Progressing     Problem: Self Care  Goal: Patient will have the ability to perform ADLs independently or with assistance (bathe, groom, dress, toilet and feed)  Outcome: Progressing     Problem: Infection - Standard  Goal: Patient will remain free from infection  Outcome: Progressing   The patient is Watcher - Medium risk of patient condition declining or worsening    Shift Goals  Clinical Goals: monitor VS and lab  Patient Goals: Sleep  Family Goals: Not present    Progress made toward(s) clinical / shift goals:      Patient is not progressing towards the following goals:    Pt AOx4. Pt denies pain, nausea, and SOB. Pt neutropenic, afebrile. PIV patent SL. Last Bm today. Pt up with standby assist. Pt walked in the [place].

## 2022-07-23 NOTE — NON-PROVIDER
This note is intended for the purposes of medical student education and feedback only.   Please refer to the documentation by this patient's assigned medical practitioner for details of care and plans.    Medical Student Admitting History and Physical  Note Author: Douglas Zamora, Student MS3  Date: 7/23/2022    Date of Admission: 7/21/2022  Primary Team: [facility] IM Green Team  Attending: Dr. Taveras  Senior Resident:  Lung  Intern: Dr. Matson  Medical Student: Douglas Sera MS3      ID/CC: Zena Myers is a 66 y.o. female with a PMH of AML and migraines who was admitted on 7/21/2022 with intermittent subjective fever.    SUBJECTIVE  HPI  Zena Myers is a 66 y.o. female with a PMH of AML diagnosed in 5/2022 who was admitted on 7/21/2022 with a 3-day Hx of neutropenic fever of 101.7 a/w headaches, lightheadedness, fatigue, nausea, and a single episode of vomiting. Pt additionally associates fever with abdominal pain that worsens with meals.    Venetoclax for the Pt's AML is currently being held due to Pt's fever. At this time, Pt is reporting no subjective fever, chills, fatigue, headaches, vision changes, and abdominal pain. Pt currently on cefepime and acyclovir prophylactically. Labs today show pancytopenia with WBC 0.7, ANC 0.01, normocytic anemia with 7.4 Hb, and platelet count of 69.    Pt reports dull submandibular pain when she begins to drink or eat that declines over time after she eats/drinks.    ROS  General: headache, fatigue, difficulty sleeping, fevers, chills, night sweats  HEENT: denies change in vision, change in hearing pain or difficulty swallowing; reports self-resolving right submandibular pain with drinking/eating  Pulm: denies SOB, Hx of chronic cough for the past couple years - exacerbated by talking only  CV: denies rapid, slow or irregular heart beat (palpitations), chest pain  GI: denies heartburn, abdominal pain, constipation, diarrhea  : denies urinary incontinence, increased  urinary urgency/frequency     PMHx   has a past medical history of Acute nasopharyngitis (01/15/2022), Anemia, Anesthesia, Arthritis, Breath shortness, Bronchitis (2010), Cancer (HCC), Cough, Diabetes (HCC), Disorder of thyroid, Heart burn, Hiatus hernia syndrome, High cholesterol, Hypertension, Indigestion, Psychiatric problem, Renal disorder, and Urinary incontinence.  Immunization History   Administered Date(s) Administered   • MODERNA SARS-COV-2 VACCINE (12+) 02/04/2021, 03/03/2021, 10/25/2021, 04/13/2022     Allergies  Allergies   Allergen Reactions   • Clindamycin Rash and Swelling     rash   • Pcn [Penicillins]      Family history       Surgical Hx   has a past surgical history that includes thyroidectomy total (2005); colonoscopy; pr dx bone marrow aspirations (Left, 4/19/2022); pr dx bone marrow biopsies (Left, 4/19/2022); pr dx bone marrow aspirations (Left, 7/22/2022); and pr dx bone marrow biopsies (Left, 7/22/2022).    Medications:  Prior to Admission Medications   Prescriptions Last Dose Informant Patient Reported? Taking?   Exenatide (BYDUREON SC) 7/20/2022 at >3 DAYS  Yes Yes   Sig: Inject 1 Dose under the skin every 7 days.   SUMAtriptan (IMITREX) 25 MG Tab tablet PRN at PRN Patient No No   Sig: Take 1 Tablet by mouth every 2 hours as needed for Migraine.   acetaminophen (TYLENOL) 325 MG Tab 7/20/2022 at Unknown time  Yes Yes   Sig: Take 650 mg by mouth every four hours as needed.   acyclovir (ZOVIRAX) 200 MG Cap 7/20/2022 at pm Patient No No   Sig: Take 2 Capsules by mouth 2 times a day.   baclofen (LIORESAL) 10 MG Tab 7/20/2022 at UNK Patient Yes No   Sig: Take 10 mg by mouth 4 times a day.   buPROPion (WELLBUTRIN SR) 200 MG SR tablet 7/20/2022 at Unknown time Patient Yes No   Sig: Take 200 mg by mouth every day.   fenofibrate (TRICOR) 145 MG Tab 7/20/2022 at Unknown time Patient Yes No   Sig: Take 145 mg by mouth every day.   gabapentin (NEURONTIN) 100 MG Cap 7/20/2022 at Unknown time Patient Yes  "No   Sig: Take 100 mg by mouth 2 times a day. prn   insulin glargine (LANTUS) 100 UNIT/ML Solution 7/21/2022 at AM  No No   Sig: Inject 120 Units under the skin 2 times a day.   Patient taking differently: Inject 180 Units under the skin 2 times a day.   levothyroxine (SYNTHROID) 175 MCG Tab 7/20/2022 at AM Patient Yes No   Sig: Take 175 mcg by mouth every morning on an empty stomach.   losartan-hydrochlorothiazide (HYZAAR) 100-12.5 MG per tablet 7/20/2022 at Unknown time Patient Yes No   Sig: Take 1 Tablet by mouth every day.   ondansetron (ZOFRAN ODT) 4 MG TABLET DISPERSIBLE PRN at PRN Patient No No   Sig: Take 1 Tablet by mouth every 6 hours as needed for Nausea.   prochlorperazine (COMPAZINE) 10 MG Tab PRN at PRN Patient Yes No   Sig: Take 10 mg by mouth every 6 hours as needed.   venetoclax (VENCLEXTA) 100 MG tablet 7/20/2022 at Unknown time Patient No No   Sig: Take 1 Tablet by mouth every day.   voriconazole (VFEND) 200 MG Tab 7/21/2022 at AM Patient No No   Sig: Take 1 Tablet by mouth 2 times a day.   zonisamide (ZONEGRAN) 100 MG Cap 7/20/2022 at Unknown time Patient Yes No   Sig: Take 100 mg by mouth every day. W/50MG CAPSULE   zonisamide (ZONEGRAN) 50 MG capsule 7/20/2022 at Unknown time Patient Yes No   Sig: Take 50 mg by mouth every day. W/100MG CAPSULE      Facility-Administered Medications: None       Family Hx:  History reviewed. No pertinent family history.    Social Hx:  Living Situation: Alone.  Work: [employer] Hx; Currently retired.  Exercise: Pt plays [employer].  Tobacco: No  Marijuana: No  Alcohol: No  Illicit Drugs: No    OBJECTIVE   Physical Exam:  /50   Pulse 68   Temp 36.3 °C (97.3 °F) (Temporal)   Resp 20   Ht 1.651 m (5' 5\")   Wt 107 kg (235 lb 14.3 oz)   SpO2 99%     Intake/Output Summary (Last 24 hours) at 7/23/2022 1239  Last data filed at 7/22/2022 1802  Gross per 24 hour   Intake 480 ml   Output --   Net 480 ml       General: Well developed, well nourished, female, appears " stated age, appears to be in no acute distress.  HEENT: Normocephalic, atraumatic. EOM grossly intact, PERRLA. Mucous membranes moist. No lymphadenopathy.  Cardio: Normal S1 and S2. Regular rate and rhythm. No murmurs, rubs, or gallops.  Pulmonary: Lungs are clear to auscultation bilaterally. No wheezes, rales, or rhonchi.  Abdomen: Normoactive bowel sounds. Abdomen is soft and nondistended. No masses. No tenderness to palpation in all four quadrants. Negative Perez sign.  Psych: Appropriate mood and affect.    Lab Results:  Recent Labs     07/21/22  1732 07/22/22  0718 07/22/22  1559 07/23/22  0451   WBC 0.8* 0.6* 0.7* 0.7*   RBC 1.13* 2.47* 2.46* 2.43*   HEMOGLOBIN 3.4* 7.6* 7.5* 7.4*   HEMATOCRIT 9.8* 21.7* 21.8* 21.6*   MCV 86.7 87.9 88.6 88.9   MCH 30.1 30.8 30.5 30.5   RDW 43.4 42.8 44.7 45.1   PLATELETCT 70* 53* 61* 69*   MPV 10.1 10.3 10.6 9.6   NEUTSPOLYS 1.00* 0.00*  --  0.90*   LYMPHOCYTES 95.00* 100.00*  --  99.10*   MONOCYTES 4.00 0.00  --  0.00   EOSINOPHILS 0.00 0.00  --  0.00   BASOPHILS 0.00 0.00  --  0.00   RBCMORPHOLO Present Present  --  Normal     Recent Labs     07/21/22  1732 07/22/22  0718 07/23/22  0451   SODIUM 132* 135 136   POTASSIUM 3.8 3.9 4.1   CHLORIDE 101 105 107   CO2 19* 19* 20   BUN 17 13 13   CREATININE 1.08 0.84 0.86   CALCIUM 9.4 9.2 9.3   MAGNESIUM 2.1  --  2.0   PHOSPHORUS  --   --  3.1   ALBUMIN 3.8 3.1* 3.2     Estimated GFR/CRCL = Estimated Creatinine Clearance: 77.2 mL/min (by C-G formula based on SCr of 0.86 mg/dL).  Recent Labs     07/21/22  1732 07/22/22  0718 07/23/22  0451   GLUCOSE 188* 117* 153*     Recent Labs     07/21/22  1732 07/22/22  0718 07/23/22  0451   ASTSGOT 28 17 30   ALTSGPT 27 22 30   TBILIRUBIN 0.5 0.7 0.4   ALKPHOSPHAT 107* 94 99   GLOBULIN 3.2 3.0 3.1   INR  --  1.12  --              Recent Labs     07/22/22 0718   INR 1.12       Microbiology Results:  Results     Procedure Component Value Units Date/Time    Urine Culture (NEW) [[id number]]  "Collected: 07/21/22 1759    Order Status: Completed Specimen: Urine, Clean Catch Updated: 07/23/22 0201     Significant Indicator NEG     Source UR     Site URINE, CLEAN CATCH     Culture Result Culture in progress.    Narrative:      Indication for culture:->Evaluation for sepsis without a  clear source of infection  Indication for culture:->Evaluation for sepsis without a    S. Aureus By PCR, Nasal Complete [[id number]] Collected: 07/22/22 1345    Order Status: Completed Specimen: Respirate from Respiratory Updated: 07/22/22 1521     Staph aureus by PCR Negative     MRSA by PCR Negative    Narrative:      Special Contact Isolation  Collected By: [id number] KIMBERLEY ROE    MRSA By PCR (Amp) [[id number]] Collected: 07/22/22 1342    Order Status: Canceled Specimen: Respirate from Nares     Blood Culture [[id number]] Collected: 07/21/22 1732    Order Status: Completed Specimen: Blood from Peripheral Updated: 07/22/22 0753     Significant Indicator NEG     Source BLD     Site PERIPHERAL     Culture Result No Growth  Note: Blood cultures are incubated for 5 days and  are monitored continuously.Positive blood cultures  are called to the RN and reported as soon as  they are identified.      Narrative:      1 of 2 for Blood Culture x 2 sites order. Per Hospital  Policy: Only change Specimen Src: to \"Line\" if specified by  physician order.  No site indicated    Blood Culture [[id number]] Collected: 07/21/22 1843    Order Status: Completed Specimen: Blood from Peripheral Updated: 07/22/22 0753     Significant Indicator NEG     Source BLD     Site PERIPHERAL     Culture Result No Growth  Note: Blood cultures are incubated for 5 days and  are monitored continuously.Positive blood cultures  are called to the RN and reported as soon as  they are identified.      Narrative:      2 of 2 blood culture x2  Sites order. Per Hospital Policy:  Only change Specimen Src: to \"Line\" if specified by physician  order.  Right Hand    " "Urinalysis [[id number]]  (Abnormal) Collected: 07/21/22 1759    Order Status: Completed Specimen: Urine Updated: 07/21/22 1858     Color DK Yellow     Character Cloudy     Specific Gravity 1.023     Ph 5.5     Glucose Negative mg/dL      Ketones Trace mg/dL      Protein Negative mg/dL      Bilirubin Negative     Urobilinogen, Urine 1.0     Nitrite Negative     Leukocyte Esterase Negative     Occult Blood Negative     Micro Urine Req Microscopic    Narrative:      Indication for culture:->Evaluation for sepsis without a  clear source of infection    CoV-2, FLU A/B, and RSV by PCR (2-4 Hours CEPHEID) : Collect NP swab in VTM [[id number]] Collected: 07/21/22 1746    Order Status: Completed Specimen: Respirate Updated: 07/21/22 1853     Influenza virus A RNA Negative     Influenza virus B, PCR Negative     RSV, PCR Negative     SARS-CoV-2 by PCR NotDetected     Comment: PATIENTS: Important information regarding your results and instructions can  be found at [email or web address]   \"After your  Covid-19 Test\"    [facility] providers: PLEASE REFER TO DE-ESCALATION AND RETESTING PROTOCOL  on [email or web address]    **The [employer] GeneXpert Xpress SARS-CoV-2 RT-PCR Test has been made  available for use under the Emergency Use Authorization (EUA) only.          SARS-CoV-2 Source NP Swab          Imaging Results:  CT-HEAD W/O   Final Result         1.  No acute intracranial abnormality.         CT-ABDOMEN-PELVIS W/O   Final Result      1.  No acute abnormality within the abdomen or pelvis      2.  Hepatomegaly      3.  Colonic diverticulosis and there is increase in expected amount of stool within the colon      US-RUQ   Final Result      1.  Echogenic liver consistent with hepatic steatosis      2.  Negative for gallstones or biliary dilation      DX-CHEST-PORTABLE (1 VIEW)   Final Result      No acute cardiopulmonary abnormality identified.          EKG  No results found for this or any previous " visit.    Current Medications    Current Facility-Administered Medications:   •  omeprazole (PRILOSEC) capsule 20 mg, 20 mg, Oral, DAILY, Glenys Matson M.D., 20 mg at 07/23/22 1150  •  senna-docusate (PERICOLACE or SENOKOT S) 8.6-50 MG per tablet 2 Tablet, 2 Tablet, Oral, BID, 2 Tablet at 07/23/22 0511 **AND** polyethylene glycol/lytes (MIRALAX) PACKET 1 Packet, 1 Packet, Oral, QDAY PRN **AND** magnesium hydroxide (MILK OF MAGNESIA) suspension 30 mL, 30 mL, Oral, QDAY PRN **AND** bisacodyl (DULCOLAX) suppository 10 mg, 10 mg, Rectal, QDAY PRN, Mayuri Orourke M.D.  •  [Held by provider] enoxaparin (Lovenox) inj 40 mg, 40 mg, Subcutaneous, DAILY AT 1800, Mayuri Orourke M.D.  •  acetaminophen (Tylenol) tablet 650 mg, 650 mg, Oral, Q6HRS PRN, Mayuri Orourke M.D., 650 mg at 07/22/22 0247  •  cefepime (Maxipime) 2 g in dextrose 5% 20 mL IV syringe, 2 g, Intravenous, Q8HRS, Mayuri Orourke M.D., Stopped at 07/23/22 0515  •  voriconazole (VFEND) tablet 200 mg, 200 mg, Oral, BID, Mayuri Orourke M.D., 200 mg at 07/23/22 0510  •  zonisamide (ZONEGRAN) capsule 150 mg, 150 mg, Oral, DAILY, Mayuri Orourke M.D., 150 mg at 07/23/22 0510  •  SUMAtriptan (IMITREX) tablet 25 mg, 25 mg, Oral, Q2HRS PRN, Mayuri Orourke M.D.  •  ondansetron (ZOFRAN ODT) dispertab 4 mg, 4 mg, Oral, Q6HRS PRN, Mayuri Orourke M.D.  •  prochlorperazine (COMPAZINE) tablet 10 mg, 10 mg, Oral, Q6HRS PRN, Mayuri Orourke M.D.  •  acyclovir (ZOVIRAX) capsule 400 mg, 400 mg, Oral, BID, Mayuri Orourke M.D., 400 mg at 07/23/22 0511  •  gabapentin (NEURONTIN) capsule 100 mg, 100 mg, Oral, BID, Mayuri Orourke M.D., 100 mg at 07/23/22 0511  •  baclofen (LIORESAL) tablet 10 mg, 10 mg, Oral, 4X/DAY, Mayuri Orourke M.D., 10 mg at 07/23/22 1216  •  buPROPion SR (WELLBUTRIN-SR) tablet 200 mg, 200 mg, Oral, DAILY, Mayuri Orourke M.D., 200 mg at 07/23/22 0513  •  fenofibrate micronized  (LOFIBRA) capsule 134 mg, 134 mg, Oral, DAILY, Mayuri Orourke M.D., 134 mg at 07/23/22 0513  •  levothyroxine (SYNTHROID) tablet 175 mcg, 175 mcg, Oral, AM ES, Mayuri Orourke M.D., 175 mcg at 07/23/22 0511  •  losartan (COZAAR) tablet 100 mg, 100 mg, Oral, Q DAY, Mayuri Orourke M.D., 100 mg at 07/23/22 0510  •  hydroCHLOROthiazide (HYDRODIURIL) tablet 12.5 mg, 12.5 mg, Oral, Q DAY, Mayuri Orourke M.D., 12.5 mg at 07/23/22 0510  •  lidocaine-prilocaine (EMLA) 2.5-2.5 % cream, , Topical, PRN, Jim Brandt M.D., 1 g at 07/22/22 1542  •  heparin lock flush 100 unit/mL injection 500 Units, 500 Units, Intracatheter, PRN, Jim Brandt M.D., 500 Units at 07/22/22 1841  •  insulin lispro (AdmeLOG,HumaLOG) injection, 3-9 Units, Subcutaneous, TID AC, 3 Units at 07/23/22 1146 **AND** POC blood glucose manual result, , , Q AC AND BEDTIME(S) **AND** NOTIFY MD and PharmD, , , Once **AND** Administer 20 grams of glucose (approximately 8 ounces of fruit juice) every 15 minutes PRN FSBG less than 70 mg/dL, , , PRN **AND** dextrose 50% (D50W) injection 25 g, 25 g, Intravenous, Q15 MIN PRN, Jim Brandt M.D.    ASSESSMENT/PLAN    Neutropenic fever (present on admission)  Pt present to clinic on 7/21/22 with fever of 101.7. Denies Sx of fever now with temperature of 97.3 F. Pt has PMH of AML with WBC of 0.7, ANC of 0.01,and platelet count of 69. CXR negative. Continue to take acyclovir, voriconazole, and cefepime prophylactically to prevent further infection.     Pancytopenia (present on admission)  WBC of 0.7, ANC of 0.01, Hb 7.4 of and platelet count of 693. Likely secondary to AML. Hb > 7 - no need to transfuse blood at this time. Will continue to monitor CBC and Sx for worsening anemia. Pt denies fatigue, weakness, SOB, CP, HA and any additional Sx of anemia at this time.     HTN  Chronic Dx. BP most recently 121/60. Pt fluctuates between the 120s to 130s systolic. Continue to home  meds.     Hypothyroidism  Chronic Dx. Continue home meds.     AML  Dx on 5/2022 and f/u with oncologist, Dr. Treviño. Pt chemotherapy of decitabine and venetoclax currently on hold due to fluctuating fever. Prophylactic acyclovir, voriconazole, and cefepime prescribed as above. Pt bone marrow Bx revealed AML not having achieved remission. Pt to begin induction therapy of Dacogen and venetoclax on 5/25/22 and F/U with Dr. Treviño for outpatient cancer care when discharged per Oncology. Will continue to monitor the patient's vitals and labs until Pt can begin induction therapy.    Left submandibular pain  Pt reports left submandibular pain will swallowing that resolves a short time after swallowing. No cervical lymphadenopathy noted. Likely due to dehydration. Advised Pt to stay hydrated.    PROPHYLAXIS  • DVT: contraindicated due to severe pancytopenia

## 2022-07-23 NOTE — PROGRESS NOTES
Saulo from Lab called with critical result of WBC 0.7 at 0549. Critical lab result read back to [facility].   This critical lab result is within parameters established by [facility] for this patient.    Saulo from Lab called with critical result of ANC 0.01 at 0549. Critical lab result read back to [facility].   This critical lab result is within parameters established by [facility] for this patient.

## 2022-07-23 NOTE — PROGRESS NOTES
Oncology/Hematology Progress Note               Author: Freedom Krueger III, M.D. Date & Time created: 7/23/2022  10:59 AM   AML on Dacogen and venetoclax admitted with febrile neutropenia  Interval History:  Actually patient is doing fairly well.  No fevers.  She denies major fatigue, weakness, bone marrow biopsy performed yesterday.  Tolerating diet with no inconvenience.  She wants to go home today.  She remains severely neutropenic.    PMHx, PSurgHx, SocHx, FAMHx: Reviewed and unchanged    Review of Systems:  Review of Systems   Constitutional: Positive for malaise/fatigue. Negative for chills, fever and weight loss.   HENT: Negative.    Eyes: Negative.    Respiratory: Negative for cough, hemoptysis, sputum production, shortness of breath and wheezing.    Cardiovascular: Negative for chest pain, palpitations, orthopnea and claudication.   Gastrointestinal: Negative for abdominal pain, constipation, diarrhea, heartburn, nausea and vomiting.   Genitourinary: Negative for dysuria.   Musculoskeletal: Negative.    Skin: Negative.    Neurological: Negative.    Psychiatric/Behavioral: Negative.        Physical Exam:  Physical Exam  Constitutional:       Appearance: Normal appearance.   HENT:      Head: Normocephalic and atraumatic.      Nose: Nose normal.      Mouth/Throat:      Mouth: Mucous membranes are moist.   Eyes:      Extraocular Movements: Extraocular movements intact.      Pupils: Pupils are equal, round, and reactive to light.   Cardiovascular:      Rate and Rhythm: Normal rate and regular rhythm.   Pulmonary:      Effort: Pulmonary effort is normal.      Breath sounds: Normal breath sounds.   Abdominal:      General: Abdomen is flat.   Musculoskeletal:         General: No swelling.   Skin:     Coloration: Skin is not jaundiced.   Neurological:      Mental Status: She is alert and oriented to person, place, and time.         Labs:          Recent Labs     07/21/22  1732 07/22/22  0718 07/23/22  0451   SODIUM  132* 135 136   POTASSIUM 3.8 3.9 4.1   CHLORIDE 101 105 107   CO2 19* 19* 20   BUN 17 13 13   CREATININE 1.08 0.84 0.86   MAGNESIUM 2.1  --  2.0   PHOSPHORUS  --   --  3.1   CALCIUM 9.4 9.2 9.3     Recent Labs     22  0718 22  0451   ALTSGPT  30   ASTSGOT  30   ALKPHOSPHAT 107* 94 99   TBILIRUBIN 0.5 0.7 0.4   LIPASE 14  --   --    GLUCOSE 188* 117* 153*     Recent Labs     22  1559 22  0451   RBC 2.47* 2.46* 2.43*   HEMOGLOBIN 7.6* 7.5* 7.4*   HEMATOCRIT 21.7* 21.8* 21.6*   PLATELETCT 53* 61* 69*   PROTHROMBTM 14.3  --   --    INR 1.12  --   --    IRON  --   --  110   FERRITIN  --   --  >2000.0*   TOTIRONBC  --   --  245*     Recent Labs     22  1559 22  0451   WBC 0.8* 0.6* 0.7* 0.7*   NEUTSPOLYS 1.00* 0.00*  --  0.90*   LYMPHOCYTES 95.00* 100.00*  --  99.10*   MONOCYTES 4.00 0.00  --  0.00   EOSINOPHILS 0.00 0.00  --  0.00   BASOPHILS 0.00 0.00  --  0.00   ASTSGOT   --     ALTSGPT   --  30   ALKPHOSPHAT 107* 94  --  99   TBILIRUBIN 0.5 0.7  --  0.4     Recent Labs     22  0451   SODIUM 132* 135 136   POTASSIUM 3.8 3.9 4.1   CHLORIDE 101 105 107   CO2 19* 19* 20   GLUCOSE 188* 117* 153*   BUN 17 13 13   CREATININE 1.08 0.84 0.86   CALCIUM 9.4 9.2 9.3     Hemodynamics:  Temp (24hrs), Av.7 °C (98 °F), Min:36.3 °C (97.3 °F), Max:36.9 °C (98.5 °F)  Temperature: 36.3 °C (97.3 °F)  Pulse  Av  Min: 62  Max: 124   Blood Pressure : 121/60     Respiratory:    Respiration: 19, Pulse Oximetry: 97 %     Work Of Breathing / Effort: Within Normal Limits  RUL Breath Sounds: Clear, RML Breath Sounds: Clear, RLL Breath Sounds: Clear, TICO Breath Sounds: Clear, LLL Breath Sounds: Clear  Fluids:    Intake/Output Summary (Last 24 hours) at 2022 1059  Last data filed at 2022 1802  Gross per 24 hour   Intake 480 ml   Output --   Net 480 ml        GI/Nutrition:  Orders  Placed This Encounter   Procedures   • Diet Order Diet: Consistent CHO (Diabetic)     Standing Status:   Standing     Number of Occurrences:   1     Order Specific Question:   Diet:     Answer:   Consistent CHO (Diabetic) [4]     Medical Decision Making, by Problem:  Active Hospital Problems    Diagnosis    • *Neutropenic fever (HCC) [D70.9, R50.81]    • Pancytopenia (HCC) [D61.818]    • Hypertension [I10]    • Hypothyroid [E03.9]    • Type 2 diabetes mellitus with hyperglycemia, with long-term current use of insulin (HCC) [E11.65, Z79.4]    • AML (acute myelogenous leukemia) (HCC) [C92.00]        Plan:  1.  Febrile neutropenia.  -Started on broad-spectrum antibiotics with cefepime, panculture, chest x-ray negative.        2.  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  FISH testing negative for FLT3, IDH 1-2, T p53.  Positive for CEBPA.   -5/25/22 started  low intensity induction therapy with Dacogen and venetoclax,   -7/22 bone marrow biopsy performed, awaiting results     3.  Antibiotic prophylaxis with voriconazole and acyclovir  She agreed and verbalized her agreement and understanding with the current plan.  I answered all questions and concerns she has at this time.              Thank you for allowing me to participate in her care.     4.  Pancytopenia  due to AML and therapy related     5.  Severe anemia, transfuse if hemoglobin less than 7 g/dL or bleeding, CMV irradiated blood products     6.  Thrombocytopenia    Plan  -Patient is doing fairly well, she does not have fevers now for 2 days.  Cultures remain negative.  She wants to go home today, advised the patient to wait a couple of days until cultures are negative and finalize.  Meantime continue on cefepime, neutropenic precautions.  -Awaiting results from bone marrow biopsy  -Get cultures obtained in the outpatient hospital perform on Thursday  -Continue holding on venetoclax  -Follow-up with Dr. Treviño  in the outpatient setting to continue on therapy and discuss bone marrow biopsy results      High complexity, complex decision making        Quality-Core Measures

## 2022-07-24 ENCOUNTER — APPOINTMENT (OUTPATIENT)
Dept: RADIOLOGY | Facility: MEDICAL CENTER | Age: 67
DRG: 809 | End: 2022-07-24
Payer: MEDICARE

## 2022-07-24 VITALS
BODY MASS INDEX: 39.3 KG/M2 | DIASTOLIC BLOOD PRESSURE: 58 MMHG | SYSTOLIC BLOOD PRESSURE: 122 MMHG | TEMPERATURE: 98.6 F | OXYGEN SATURATION: 96 % | HEIGHT: 65 IN | WEIGHT: 235.89 LBS | HEART RATE: 70 BPM | RESPIRATION RATE: 16 BRPM

## 2022-07-24 PROBLEM — R50.81 NEUTROPENIC FEVER (HCC): Status: RESOLVED | Noted: 2022-07-21 | Resolved: 2022-07-24

## 2022-07-24 PROBLEM — D70.9 NEUTROPENIC FEVER (HCC): Status: RESOLVED | Noted: 2022-07-21 | Resolved: 2022-07-24

## 2022-07-24 LAB
ALBUMIN SERPL BCP-MCNC: 3.2 G/DL (ref 3.2–4.9)
ALBUMIN/GLOB SERPL: 1.1 G/DL
ALP SERPL-CCNC: 104 U/L (ref 30–99)
ALT SERPL-CCNC: 29 U/L (ref 2–50)
ANION GAP SERPL CALC-SCNC: 10 MMOL/L (ref 7–16)
ANISOCYTOSIS BLD QL SMEAR: ABNORMAL
AST SERPL-CCNC: 28 U/L (ref 12–45)
BASOPHILS # BLD AUTO: 0.9 % (ref 0–1.8)
BASOPHILS # BLD: 0 K/UL (ref 0–0.12)
BILIRUB SERPL-MCNC: 0.6 MG/DL (ref 0.1–1.5)
BUN SERPL-MCNC: 12 MG/DL (ref 8–22)
CALCIUM SERPL-MCNC: 9.3 MG/DL (ref 8.5–10.5)
CHLORIDE SERPL-SCNC: 102 MMOL/L (ref 96–112)
CO2 SERPL-SCNC: 21 MMOL/L (ref 20–33)
CREAT SERPL-MCNC: 0.85 MG/DL (ref 0.5–1.4)
EOSINOPHIL # BLD AUTO: 0 K/UL (ref 0–0.51)
EOSINOPHIL NFR BLD: 0 % (ref 0–6.9)
ERYTHROCYTE [DISTWIDTH] IN BLOOD BY AUTOMATED COUNT: 46 FL (ref 35.9–50)
GFR SERPLBLD CREATININE-BSD FMLA CKD-EPI: 75 ML/MIN/1.73 M 2
GLOBULIN SER CALC-MCNC: 2.8 G/DL (ref 1.9–3.5)
GLUCOSE BLD STRIP.AUTO-MCNC: 176 MG/DL (ref 65–99)
GLUCOSE BLD STRIP.AUTO-MCNC: 186 MG/DL (ref 65–99)
GLUCOSE SERPL-MCNC: 219 MG/DL (ref 65–99)
HCT VFR BLD AUTO: 21.1 % (ref 37–47)
HGB BLD-MCNC: 7.2 G/DL (ref 12–16)
HYPOCHROMIA BLD QL SMEAR: ABNORMAL
LYMPHOCYTES # BLD AUTO: 0.49 K/UL (ref 1–4.8)
LYMPHOCYTES NFR BLD: 98.2 % (ref 22–41)
MAGNESIUM SERPL-MCNC: 1.8 MG/DL (ref 1.5–2.5)
MANUAL DIFF BLD: NORMAL
MCH RBC QN AUTO: 31 PG (ref 27–33)
MCHC RBC AUTO-ENTMCNC: 34.1 G/DL (ref 33.6–35)
MCV RBC AUTO: 90.9 FL (ref 81.4–97.8)
MICROCYTES BLD QL SMEAR: ABNORMAL
MONOCYTES # BLD AUTO: 0 K/UL (ref 0–0.85)
MONOCYTES NFR BLD AUTO: 0 % (ref 0–13.4)
MORPHOLOGY BLD-IMP: NORMAL
NEUTROPHILS # BLD AUTO: 0 K/UL (ref 2–7.15)
NEUTROPHILS NFR BLD: 0.9 % (ref 44–72)
NRBC # BLD AUTO: 0 K/UL
NRBC BLD-RTO: 0 /100 WBC
PHOSPHATE SERPL-MCNC: 2.6 MG/DL (ref 2.5–4.5)
PLATELET # BLD AUTO: 65 K/UL (ref 164–446)
PLATELET BLD QL SMEAR: NORMAL
PMV BLD AUTO: 10.4 FL (ref 9–12.9)
POTASSIUM SERPL-SCNC: 3.9 MMOL/L (ref 3.6–5.5)
PREALB SERPL-MCNC: 10.2 MG/DL (ref 18–38)
PROT SERPL-MCNC: 6 G/DL (ref 6–8.2)
RBC # BLD AUTO: 2.32 M/UL (ref 4.2–5.4)
RBC BLD AUTO: PRESENT
SODIUM SERPL-SCNC: 133 MMOL/L (ref 135–145)
WBC # BLD AUTO: 0.5 K/UL (ref 4.8–10.8)

## 2022-07-24 PROCEDURE — 83735 ASSAY OF MAGNESIUM: CPT

## 2022-07-24 PROCEDURE — 86945 BLOOD PRODUCT/IRRADIATION: CPT

## 2022-07-24 PROCEDURE — 36598 INJ W/FLUOR EVAL CV DEVICE: CPT

## 2022-07-24 PROCEDURE — A9270 NON-COVERED ITEM OR SERVICE: HCPCS | Performed by: STUDENT IN AN ORGANIZED HEALTH CARE EDUCATION/TRAINING PROGRAM

## 2022-07-24 PROCEDURE — P9016 RBC LEUKOCYTES REDUCED: HCPCS

## 2022-07-24 PROCEDURE — 82962 GLUCOSE BLOOD TEST: CPT

## 2022-07-24 PROCEDURE — 85025 COMPLETE CBC W/AUTO DIFF WBC: CPT

## 2022-07-24 PROCEDURE — 700102 HCHG RX REV CODE 250 W/ 637 OVERRIDE(OP)

## 2022-07-24 PROCEDURE — 36430 TRANSFUSION BLD/BLD COMPNT: CPT

## 2022-07-24 PROCEDURE — 99239 HOSP IP/OBS DSCHRG MGMT >30: CPT | Mod: GC | Performed by: INTERNAL MEDICINE

## 2022-07-24 PROCEDURE — A9270 NON-COVERED ITEM OR SERVICE: HCPCS

## 2022-07-24 PROCEDURE — 85007 BL SMEAR W/DIFF WBC COUNT: CPT

## 2022-07-24 PROCEDURE — 84100 ASSAY OF PHOSPHORUS: CPT

## 2022-07-24 PROCEDURE — 700101 HCHG RX REV CODE 250: Performed by: STUDENT IN AN ORGANIZED HEALTH CARE EDUCATION/TRAINING PROGRAM

## 2022-07-24 PROCEDURE — 700102 HCHG RX REV CODE 250 W/ 637 OVERRIDE(OP): Performed by: STUDENT IN AN ORGANIZED HEALTH CARE EDUCATION/TRAINING PROGRAM

## 2022-07-24 PROCEDURE — 80053 COMPREHEN METABOLIC PANEL: CPT

## 2022-07-24 PROCEDURE — 700117 HCHG RX CONTRAST REV CODE 255

## 2022-07-24 PROCEDURE — 86923 COMPATIBILITY TEST ELECTRIC: CPT

## 2022-07-24 PROCEDURE — 700111 HCHG RX REV CODE 636 W/ 250 OVERRIDE (IP): Performed by: STUDENT IN AN ORGANIZED HEALTH CARE EDUCATION/TRAINING PROGRAM

## 2022-07-24 PROCEDURE — 700105 HCHG RX REV CODE 258: Performed by: STUDENT IN AN ORGANIZED HEALTH CARE EDUCATION/TRAINING PROGRAM

## 2022-07-24 PROCEDURE — B5181ZZ FLUOROSCOPY OF SUPERIOR VENA CAVA USING LOW OSMOLAR CONTRAST: ICD-10-PCS | Performed by: RADIOLOGY

## 2022-07-24 RX ORDER — DIPHENHYDRAMINE HCL 25 MG
25 TABLET ORAL EVERY 8 HOURS PRN
Status: DISCONTINUED | OUTPATIENT
Start: 2022-07-24 | End: 2022-07-24 | Stop reason: HOSPADM

## 2022-07-24 RX ORDER — LIDOCAINE HYDROCHLORIDE 10 MG/ML
INJECTION, SOLUTION EPIDURAL; INFILTRATION; INTRACAUDAL; PERINEURAL
Status: DISCONTINUED
Start: 2022-07-24 | End: 2022-07-24 | Stop reason: HOSPADM

## 2022-07-24 RX ADMIN — LEVOTHYROXINE SODIUM 175 MCG: 0.12 TABLET ORAL at 06:04

## 2022-07-24 RX ADMIN — BACLOFEN 10 MG: 10 TABLET ORAL at 15:42

## 2022-07-24 RX ADMIN — LOSARTAN POTASSIUM 100 MG: 50 TABLET, FILM COATED ORAL at 06:05

## 2022-07-24 RX ADMIN — CEFEPIME 2 G: 2 INJECTION, POWDER, FOR SOLUTION INTRAVENOUS at 11:08

## 2022-07-24 RX ADMIN — BUPROPION HYDROCHLORIDE 200 MG: 100 TABLET, EXTENDED RELEASE ORAL at 06:04

## 2022-07-24 RX ADMIN — ACYCLOVIR 400 MG: 200 CAPSULE ORAL at 06:10

## 2022-07-24 RX ADMIN — HYDROCHLOROTHIAZIDE 12.5 MG: 25 TABLET ORAL at 06:05

## 2022-07-24 RX ADMIN — VORICONAZOLE 200 MG: 200 TABLET ORAL at 18:28

## 2022-07-24 RX ADMIN — ZONISAMIDE 150 MG: 50 CAPSULE ORAL at 06:10

## 2022-07-24 RX ADMIN — LIDOCAINE AND PRILOCAINE 2.5 %: 25; 25 CREAM TOPICAL at 06:30

## 2022-07-24 RX ADMIN — VORICONAZOLE 200 MG: 200 TABLET ORAL at 06:04

## 2022-07-24 RX ADMIN — BACLOFEN 10 MG: 10 TABLET ORAL at 18:29

## 2022-07-24 RX ADMIN — HEPARIN 500 UNITS: 100 SYRINGE at 18:44

## 2022-07-24 RX ADMIN — CEFEPIME 2 G: 2 INJECTION, POWDER, FOR SOLUTION INTRAVENOUS at 15:42

## 2022-07-24 RX ADMIN — POLYETHYLENE GLYCOL 3350 1 PACKET: 17 POWDER, FOR SOLUTION ORAL at 06:10

## 2022-07-24 RX ADMIN — FENOFIBRATE 134 MG: 134 CAPSULE ORAL at 06:04

## 2022-07-24 RX ADMIN — GABAPENTIN 100 MG: 100 CAPSULE ORAL at 06:04

## 2022-07-24 RX ADMIN — OMEPRAZOLE 20 MG: 20 CAPSULE, DELAYED RELEASE ORAL at 06:04

## 2022-07-24 RX ADMIN — DIPHENHYDRAMINE HYDROCHLORIDE 25 MG: 25 TABLET ORAL at 15:57

## 2022-07-24 RX ADMIN — ACYCLOVIR 400 MG: 200 CAPSULE ORAL at 18:29

## 2022-07-24 RX ADMIN — ACETAMINOPHEN 650 MG: 325 TABLET, FILM COATED ORAL at 15:57

## 2022-07-24 RX ADMIN — GABAPENTIN 100 MG: 100 CAPSULE ORAL at 18:28

## 2022-07-24 RX ADMIN — BACLOFEN 10 MG: 10 TABLET ORAL at 09:43

## 2022-07-24 ASSESSMENT — ENCOUNTER SYMPTOMS
CONSTIPATION: 0
NAUSEA: 0
FEVER: 0
PSYCHIATRIC NEGATIVE: 1
WEIGHT LOSS: 0
COUGH: 0
SPUTUM PRODUCTION: 0
EYES NEGATIVE: 1
PALPITATIONS: 0
HEMOPTYSIS: 0
NEUROLOGICAL NEGATIVE: 1
MUSCULOSKELETAL NEGATIVE: 1
CHILLS: 0
ORTHOPNEA: 0
ABDOMINAL PAIN: 0
VOMITING: 0
WHEEZING: 0
DIARRHEA: 0
HEARTBURN: 0
SHORTNESS OF BREATH: 0
CLAUDICATION: 0

## 2022-07-24 ASSESSMENT — PATIENT HEALTH QUESTIONNAIRE - PHQ9
SUM OF ALL RESPONSES TO PHQ9 QUESTIONS 1 AND 2: 0
1. LITTLE INTEREST OR PLEASURE IN DOING THINGS: NOT AT ALL
2. FEELING DOWN, DEPRESSED, IRRITABLE, OR HOPELESS: NOT AT ALL

## 2022-07-24 NOTE — PROGRESS NOTES
Pt's vitals taken at 1937** showed temp of 99.2 F oral. Pt was very anxious about the increased temperature, [facility] green team notified at 1955. This nurse was then instructed to continue monitoring and if the next vitals showed another increase, I was to notify the provider for further orders.

## 2022-07-24 NOTE — PROGRESS NOTES
Patient brought to IR 3 for port-o-gram with possible intervention by Dr Diamond. After consent was obtained, patient was safely transferred to procedure table and positioned supine, connected to cardiac and respiratory monitoring, safety strap applied for procedure. Patient prepped and draped in sterile fashion and timeout was called. Non-invasive testing, vital monitoring unnecessary.    Procedure start: 1247  Procedure end: 1254    Patient tolerated procedure well, no complications. Patient safely transferred back to wheelchair. Patient returned to R3.

## 2022-07-24 NOTE — PROGRESS NOTES
[facility] Internal Med DAILY PROGRESS NOTE    Date of Service: 7/24/2022    Primary Team: R IM Green Team   Attending: Quinton Taveras M.D.   Senior Resident: Dr. Brandt  Intern: Glenys Matson M.D.  Contact:  [phone]    Patient ID:  Name: Zena Myers  YOB: 1955  Code Status: Full Code    Chief Complaint(s):  Sent by MD (Sent by oncologist Dr. Treviño for further workup and admit. Has bone marrow biposy scheduled tomorrow. Hx leukemia. On oral chemo, starting infusions monday), Fever (X1 week. Seen in [facility] yesterday, unable to find source), Nausea, and Malaise (Decreased PO intake the last few days)    Hospital Course:   66-year-old female with a past medical history of acute AML leukemia and chronic migraines presented to emergency department on 7/21/2022 with a 3 day history of on and off subjective fevers and spiking a 101.7 fever yesterday.  Patient associates symptoms with lower abdominal pain which is triggered and worsened 1 hour after meals as well as 3 to 4-day constipation.  She still passing gas.        At the emergency department stable vitals and patient saturating well on room air.  CBC with leukopenia, ANC at 0.01, normocytic anemia with hemoglobin at 3.4 and thrombocytopenia at 70.  Chemistry with mild hyponatremia and anion gap metabolic acidosis.  Alkaline phosphatase 107.  Viral panel negative.  Lactic acid, troponin and lipase low.  Urinalysis without signs of infection.  CRP 14.8.  Chest x-ray with no acute cardiopulmonary abnormalities.  Abdominal/pelvic CT with hepatomegaly and colonic diverticulosis with increase amount of stool in the colon.  Right upper quadrant ultrasound with hepatic steatosis otherwise normal.  Blood samples were collected for culture and patient started on IV cefepime.  Patient was admitted for neutropenic fever requiring IV cefepime and further work-up with head CT along with bone marrow biopsy.     Interval Update (7/24/2022): Pt was  seen today at bedside in no acute distress. She has no signs of infection on exam and has not had a documented fever in >48 hours. Pt still severly neutropenic. Pt's morning dose of cefepime was delayed 2/2 lost IV access in arm, but access re-established via u/s guided placement and she received the abx dose @1100. There was potential concern for lost central access but IR confirmed appropriate position and placement via venogram.   - Per heme/onc: pt is ok to d/c from their perspective given negative prelim culture results from outside facility and administration of 1 unit of blood to stabilize pt's hgb.   - Updated patient this afternoon, she would like to go home. Please see d/c note for any further updates.     Consultants/Specialty:  oncology       A representative from my team or myself have discussed this patient's plan of care and discharge plan at IDT rounds today with Case Management, Nursing, Nursing leadership, and other members of the IDT team.    Disposition:  Patient is not medically cleared for discharge.   Anticipate discharge to to home with close outpatient follow-up.  I have placed the appropriate orders for post-discharge needs.    Review of Systems:    Constitutional: Denies fevers, chills, weight changes, appetite changes, +fatigue  Eyes: Denies changes in vision, scleral icterus  ENT: Denies nasal congestion, sore throat  CV/Respiratory: Denies CP, palpitations, denies SOB, +cough  Gastroenterology: Denies abdominal pain, bloating, dysphagia, nausea, vomiting, diarrhea, constipation, blood in stool  Genitourinary: Denies dysuria or frequency  MSK/Rheum: Denies joint pain, denies joint swelling  Skin: Denies rash, jaundice, ecchymosis  Neuro/Psych: Denies headache, changes in memory, or focal weakness, denies mood disorders, denies SI/HI  Endo/Heme/Onc: Denies thyroid problems, denies enlarged lymph nodes +bruises/bleeds easily      PHYSICAL EXAM:  Vitals:    07/24/22 0000 07/24/22 0100  07/24/22 0412 07/24/22 0555   BP:  118/45 102/47 114/62   Pulse:  61 64    Resp:  16 15 20   Temp: 36.6 °C (97.8 °F) 37.1 °C (98.8 °F) 36.8 °C (98.2 °F) 36.8 °C (98.3 °F)   TempSrc: Temporal Oral Oral Temporal   SpO2:  91% 95% 95%   Weight:       Height:        Body mass index is 39.25 kg/m².  Oxygen Therapy: Pulse Oximetry: 95 %, O2 (LPM): 0, O2 Delivery Device: None - Room Air    Intake/Output Summary (Last 24 hours) at 7/23/2022 1714  Last data filed at 7/23/2022 1322  Gross per 24 hour   Intake 480 ml   Output --   Net 480 ml     Constitutional: Well developed, well nourished, no acute distress, +obese  Head: Normocephalic  EENT: Conjunctiva not pale, Sclera anicteric, moist mucous membranes, Nose normal. No thyromegaly  Neck: Normal range of motion, No cervical tenderness, +R submandibular tenderness  Chest/Lungs: Symmetric expansion, BS clear to auscultation BL, no crackles, no wheezing.   Cardiovascular: Normal heart rate, Normal rhythm, No extra heart sounds  Abdomen: Bowel sounds normoactive, Soft, non-tender, no guarding  Skin/Extremities: Warm and dry, no cyanosis, edema, or erythema, no rashes or induration, +bruising  Neurologic: Alert and oriented  Psychiatric: Mood and behavior normal    Laboratory Review:  Recent Labs     07/22/22  0718 07/22/22  1559 07/23/22  0451   WBC 0.6* 0.7* 0.7*   RBC 2.47* 2.46* 2.43*   HEMOGLOBIN 7.6* 7.5* 7.4*   HEMATOCRIT 21.7* 21.8* 21.6*   MCV 87.9 88.6 88.9   MCH 30.8 30.5 30.5   MCHC 35.0 34.4 34.3   RDW 42.8 44.7 45.1   PLATELETCT 53* 61* 69*   MPV 10.3 10.6 9.6     Recent Labs     07/21/22  1732 07/22/22  0718 07/23/22  0451   SODIUM 132* 135 136   POTASSIUM 3.8 3.9 4.1   CHLORIDE 101 105 107   CO2 19* 19* 20   BUN 17 13 13   CREATININE 1.08 0.84 0.86   MAGNESIUM 2.1  --  2.0   PHOSPHORUS  --   --  3.1   CALCIUM 9.4 9.2 9.3     Recent Labs     07/21/22  1732 07/22/22  0718 07/23/22  0451   ALTSGPT 27 22 30   ASTSGOT 28 17 30   ALKPHOSPHAT 107* 94 99   TBILIRUBIN 0.5  0.7 0.4   LIPASE 14  --   --    GLUCOSE 188* 117* 153*     Recent Labs     07/22/22  0718   INR 1.12           Imaging/Procedures Review:    CT-HEAD W/O   Final Result         1.  No acute intracranial abnormality.         CT-ABDOMEN-PELVIS W/O   Final Result      1.  No acute abnormality within the abdomen or pelvis      2.  Hepatomegaly      3.  Colonic diverticulosis and there is increase in expected amount of stool within the colon      US-RUQ   Final Result      1.  Echogenic liver consistent with hepatic steatosis      2.  Negative for gallstones or biliary dilation      DX-CHEST-PORTABLE (1 VIEW)   Final Result      No acute cardiopulmonary abnormality identified.          ASSESSMENT & PLAN via Problem Representation:  * Neutropenic fever (HCC)- (present on admission)  Assessment & Plan  Patient presents with fever of 101.7 degrees. No prior micro results available.  Patient denies any prior neutropenic fever/infection history. Unclear source, as patient is chest x-ray normal, and no dysuria, no signs of skin infection. Imaging with diverticulosis not diverticulitis.  Large stool burden. Last fever documented 7/22/22 @0300. Multiple BM since admission.  - Continue bowel protocol  - Currently no signs of meningitis clinically, monitor closely if needed consider LP  - IV cefipeme ppx  - daily CBC with differential  - will trial chemotherapy administration and reassess    Pancytopenia (HCC)- (present on admission)  Assessment & Plan  Patient presents with pancytopenia, likely secondary to chemo  Anemia with hemoglobin 3.4  Thrombocytopenia with platelets 70  Leukopenia with white blood cell count 0.8  S/p 1 unit pRBCs  Plan:  Goal Hgb >7.0, Plts >20  Serial H/H q12hrs  Neutropenic precautions    AML (acute myelogenous leukemia) (HCC)- (present on admission)  Assessment & Plan  - Venetoclax and decitabine  - Prophylactic fungal and antiviral meds  - Patient follows with Dr. Treviño [facility]  (outpatient)    - Oncology (Dr. Krueger) following inpatient: rec's as follows:       -continue on cefepime, neutropenic precautions       -pending final culture results and bone marrow biopsy       -Get cultures obtained in the outpatient hospital perform on Thursday       -Continue holding on venetoclax       -Follow-up with Dr. Treviño in the outpatient setting to continue on therapy and discuss bone marrow biopsy results    Hypertension- (present on admission)  Assessment & Plan  Continue home meds    Hypothyroid- (present on admission)  Assessment & Plan  Continue home meds    Type 2 diabetes mellitus with hyperglycemia, with long-term current use of insulin (HCC)- (present on admission)  Assessment & Plan  On lantus to 100 mg BID with carb counting  Held lantus due to NPO status  BS currently stable  Monitor closely  ISS/hypoglycemia protocol      VTE prophylaxis: pharmacologic prophylaxis contraindicated due to pancytopenia  Tong/Tubes/Lines/Drains: single lumen post R chest, PIV R forearm  Nutrition/Diet Orders:   Orders Placed This Encounter   Procedures   • Diet Order Diet: Consistent CHO (Diabetic)     Standing Status:   Standing     Number of Occurrences:   1     Order Specific Question:   Diet:     Answer:   Consistent CHO (Diabetic) [4]       Thank you very much for allowing me to participate in this patient's care. I have performed a physical exam and reviewed and updated ROS and Plan today (7/24/2022). In review of yesterday's note (7/23/2022), there are no changes except as documented above. All plans of care were discussed with the attending physician. Please contact me with any questions.    Glenys Matson MD, PGY1

## 2022-07-24 NOTE — DISCHARGE SUMMARY
[facility] Internal Medicine Discharge Summary    Attending: Quinton Taveras M.d.  Senior Resident: Dr. Brandt  Intern:  Dr. Matson  Contact Number: [phone]    CHIEF COMPLAINT ON ADMISSION  Chief Complaint   Patient presents with   • Sent by MD     Sent by oncologist Dr. Treviño for further workup and admit. Has bone marrow biposy scheduled tomorrow. Hx leukemia. On oral chemo, starting infusions monday   • Fever     X1 week. Seen in [facility] yesterday, unable to find source   • Nausea   • Malaise     Decreased PO intake the last few days       Reason for Admission  Sent by MD      Admission Date  7/21/2022    CODE STATUS  Full Code    HPI & HOSPITAL COURSE  This is a 66 y.o. female here with 3 day history of on and off subjective fevers and documented fever of 101.7     66-year-old female with a past medical history of acute AML leukemia and chronic migraines presented to emergency department on 7/21/2022 with a 3 day history of on and off subjective fevers and spiking a 101.7 fever yesterday.  Patient associates symptoms with lower abdominal pain which is triggered and worsened 1 hour after meals as well as 3 to 4-day constipation.  She still passing gas.        At the emergency department stable vitals and patient saturating well on room air.  CBC with leukopenia, ANC at 0.01, normocytic anemia with hemoglobin at 3.4 and thrombocytopenia at 70.  Chemistry with mild hyponatremia and anion gap metabolic acidosis.  Alkaline phosphatase 107.  Viral panel negative.  Lactic acid, troponin and lipase low.  Urinalysis without signs of infection.  CRP 14.8.  Chest x-ray with no acute cardiopulmonary abnormalities.  Abdominal/pelvic CT with hepatomegaly and colonic diverticulosis with increase amount of stool in the colon.  Right upper quadrant ultrasound with hepatic steatosis otherwise normal.  Blood samples were collected for culture and patient started on IV cefepime.  Patient was admitted for neutropenic fever requiring  IV cefepime and further work-up with head CT along with bone marrow biopsy.    During admission, pt resulted FOBT+ but patient denies visible blood in stool. She had no signs of infection on exam and no documented fever for at least 48 hours before discharge date. There was potential concern for lost central access but IR confirmed appropriate position and placement via venogram. Patient's most recent labs show pt is still severely neutropenic and a hgb of 7.2 but is otherwise stable and back to her relative baseline. Prelim culture results are no growth to date from outside facility. S/p transfusion of pRBC x1 as recommended by heme/onc (Dr. Krueger), pt is ok to be discharged this afternoon.    Close follow-up with her outpatient hematology/oncology team was recommended to patient and she is agreeable to this plan.       Therefore, she is discharged in fair and stable condition to home with close outpatient follow-up.    The patient met 2-midnight criteria for an inpatient stay at the time of discharge.    Discharge Date  7/24/22    Physical Exam on Day of Discharge  Constitutional: Well developed, well nourished, no acute distress, +obese  Head: Normocephalic  EENT: Conjunctiva not pale, Sclera anicteric, moist mucous membranes, Nose normal. No thyromegaly  Neck: Normal range of motion, No cervical tenderness, +R submandibular tenderness  Chest/Lungs: Symmetric expansion, BS clear to auscultation BL, no crackles, no wheezing.   Cardiovascular: Normal heart rate, Normal rhythm, No extra heart sounds  Abdomen: Bowel sounds normoactive, Soft, non-tender, no guarding  Skin/Extremities: Warm and dry, no cyanosis, edema, or erythema, no rashes or induration, +bruising  Neurologic: Alert and oriented  Psychiatric: Mood and behavior normal    FOLLOW UP ITEMS POST DISCHARGE  - close f/u with Dr. Treviño and others involved in her heme/onc medical care. She has a scheduled appointment on July 28 @ 1330 to discuss next  steps.     DISCHARGE DIAGNOSES  Principal Problem (Resolved):    Neutropenic fever (HCC) POA: Yes  Active Problems:    AML (acute myelogenous leukemia) (HCC) POA: Yes    Pancytopenia (HCC) POA: Yes    Type 2 diabetes mellitus with hyperglycemia, with long-term current use of insulin (HCC) POA: Yes    Hypothyroid POA: Yes    Hypertension POA: Yes    FOLLOW UP  Luis A Treviño M.D.  [address]  [place] [address]  [phone]    Go to  regularly scheduled appointment on July 28 at 1330      MEDICATIONS ON DISCHARGE     Medication List      ASK your doctor about these medications      Instructions   acetaminophen 325 MG Tabs  Commonly known as: Tylenol   Take 650 mg by mouth every four hours as needed.  Dose: 650 mg     acyclovir 200 MG Caps  Commonly known as: ZOVIRAX   Take 2 Capsules by mouth 2 times a day.  Dose: 400 mg     baclofen 10 MG Tabs  Commonly known as: LIORESAL   Take 10 mg by mouth 4 times a day.  Dose: 10 mg     buPROPion 200 MG SR tablet  Commonly known as: WELLBUTRIN SR   Take 200 mg by mouth every day.  Dose: 200 mg     BYDUREON SC   Inject 1 Dose under the skin every 7 days.  Dose: 1 Dose     fenofibrate 145 MG Tabs  Commonly known as: TRICOR   Take 145 mg by mouth every day.  Dose: 145 mg     gabapentin 100 MG Caps  Commonly known as: NEURONTIN   Take 100 mg by mouth 2 times a day. prn  Dose: 100 mg     insulin glargine 100 UNIT/ML Soln  Commonly known as: Lantus   Inject 120 Units under the skin 2 times a day.  Dose: 120 Units     levothyroxine 175 MCG Tabs  Commonly known as: SYNTHROID   Take 175 mcg by mouth every morning on an empty stomach.  Dose: 175 mcg     losartan-hydrochlorothiazide 100-12.5 MG per tablet  Commonly known as: HYZAAR   Take 1 Tablet by mouth every day.  Dose: 1 Tablet     ondansetron 4 MG Tbdp  Commonly known as: ZOFRAN ODT   Take 1 Tablet by mouth every 6 hours as needed for Nausea.  Dose: 4 mg     prochlorperazine 10 MG Tabs  Commonly known as:  COMPAZINE   Take 10 mg by mouth every 6 hours as needed.  Dose: 10 mg     SUMAtriptan 25 MG Tabs tablet  Commonly known as: IMITREX   Take 1 Tablet by mouth every 2 hours as needed for Migraine.  Dose: 25 mg     venetoclax 100 MG tablet  Commonly known as: VENCLEXTA   Take 1 Tablet by mouth every day.  Dose: 100 mg     voriconazole 200 MG Tabs  Commonly known as: VFEND   Take 1 Tablet by mouth 2 times a day.  Dose: 200 mg     * zonisamide 100 MG Caps  Commonly known as: ZONEGRAN   Take 100 mg by mouth every day. W/50MG CAPSULE  Dose: 100 mg     * zonisamide 50 MG capsule  Commonly known as: ZONEGRAN   Take 50 mg by mouth every day. W/100MG CAPSULE  Dose: 50 mg         * This list has 2 medication(s) that are the same as other medications prescribed for you. Read the directions carefully, and ask your doctor or other care provider to review them with you.                Allergies  Allergies   Allergen Reactions   • Clindamycin Rash and Swelling     rash   • Pcn [Penicillins]      Family history       DIET  Orders Placed This Encounter   Procedures   • Diet Order Diet: Consistent CHO (Diabetic)     Standing Status:   Standing     Number of Occurrences:   1     Order Specific Question:   Diet:     Answer:   Consistent CHO (Diabetic) [4]       ACTIVITY  As tolerated.  Weight bearing as tolerated    CONSULTATIONS  Dr. Krueger (inpatient)  Dr. Treviño (outpatient)    PROCEDURES (during admission)  - venogram to check PICC status  - pRBC transfusions x2    LABORATORY  Lab Results   Component Value Date    SODIUM 133 (L) 07/24/2022    POTASSIUM 3.9 07/24/2022    CHLORIDE 102 07/24/2022    CO2 21 07/24/2022    GLUCOSE 219 (H) 07/24/2022    BUN 12 07/24/2022    CREATININE 0.85 07/24/2022        Lab Results   Component Value Date    WBC 0.5 (LL) 07/24/2022    HEMOGLOBIN 7.2 (L) 07/24/2022    HEMATOCRIT 21.1 (L) 07/24/2022    PLATELETCT 65 (L) 07/24/2022        Total time of the discharge process exceeds 40 minutes.

## 2022-07-24 NOTE — ASSESSMENT & PLAN NOTE
- continue home losaran 100mg qd, and hctz 12.5mg qd   Consult Date: 9/11/2021    Consults  Ms. Kerwin Lemus is a 58year old woman with history of breast cancer who was brought in 09/03 because she was found down on the floor unresposnive. Per EMS she was having seizures. She was given a total of 10 mg versed. She has fevers of 102. 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 She was started on acyclovir 10 mg /kg q8h and LP done/ LP results consistent with viral encephalitis. She is also on keppra 1000 mg q12hrs. Subjective  opens eyes, tells me her name. 2 days ago had worsening mental status. Received 10 mg stat dose od IV decadron because of worsening edema of the right temporal love. Valproic acid 250 gm tid added. EEG ordered. ID increased acyclovir to 15mg/kg from 10mg/kg. 9/11/21: Ct revoewed and right temporal lobe edema is stable. Patient clinically more alert and awake    Past Medical History:   Diagnosis Date    Breast cancer ([address]     Depression     Diabetes [facility] [address]     High cholesterol       Past Surgical History:   Procedure Laterality Date    HX MASTECTOMY      IR [employer] CVAD  9/9/2021     History reviewed. No pertinent family history.    Social History     Tobacco Use    Smoking status: Never Smoker   Substance Use Topics    Alcohol use: Not Currently       Current Facility-Administered Medications   Medication Dose Route Frequency Provider Last Rate Last Admin    0.45% sodium chloride infusion  75 mL/hr IntraVENous CONTINUOUS Dick Vásquez MD 75 mL/hr at 09/11/21 0216 75 mL/hr at 09/11/21 0216    citalopram (CELEXA) tablet 20 mg  20 mg Oral DAILY Ramakrishna Lee MD   20 mg at 09/11/21 0926    nystatin (MYCOSTATIN) 100,000 unit/mL oral suspension 500,000 Units  500,000 Units Oral QID Ramakrishna Lee MD   500,000 Units at 09/11/21 0925    dexamethasone (DECADRON) 4 mg/mL injection 4 mg  4 mg IntraVENous Q6H Shelley Aguilar MD   4 mg at 09/11/21 1156    apixaban (ELIQUIS) tablet 5 mg  5 mg Oral BID Janis Faulkner MD   5 mg at 09/11/21 0926    acyclovir (ZOVIRAX) 900 mg in 0.9% sodium chloride 250 mL IVPB  15 mg/kg (Ideal) IntraVENous Q8H Debra Serrato  mL/hr at 09/11/21 0602 900 mg at 09/11/21 0602    valproate (DEPACON) 200 mg in 0.9% sodium chloride 50 mL IVPB  200 mg IntraVENous Q6H Mahad Silva MD 50 mL/hr at 09/11/21 0608 200 mg at 09/11/21 0608    levETIRAcetam (KEPPRA) 2,000 mg in 0.9% sodium chloride 250 mL IVPB  2,000 mg IntraVENous Q12H Mahad Silva MD   2,000 mg at 09/11/21 1052    amLODIPine (NORVASC) tablet 5 mg  5 mg Oral DAILY Norberto Lacey MD   5 mg at 09/11/21 0926    hydrALAZINE (APRESOLINE) 20 mg/mL injection 10 mg  10 mg IntraVENous Q6H PRN Norberto Lacey MD   10 mg at 09/10/21 1311    insulin lispro (HUMALOG) injection   SubCUTAneous AC&HS Janis Faulkner MD   2 Units at 09/10/21 2652    potassium chloride (KLOR-CON) packet for solution 20 mEq  20 mEq Oral BID WITH MEALS Janis Faulkner MD   20 mEq at 09/11/21 0738    sodium chloride (NS) flush 5-40 mL  5-40 mL IntraVENous Q8H Janis Faulkner MD   10 mL at 09/11/21 0600    sodium chloride (NS) flush 5-40 mL  5-40 mL IntraVENous PRN Janis Faulkner MD        acetaminophen (TYLENOL) tablet 650 mg  650 mg Oral Q6H PRN Janis Faulkner MD   650 mg at 09/06/21 1236    Or    acetaminophen (TYLENOL) suppository 650 mg  650 mg Rectal Q6H PRN Janis Faulkner MD        polyethylene glycol (MIRALAX) packet 17 g  17 g Oral DAILY PRN Janis Faulkner MD        promethazine (PHENERGAN) tablet 12.5 mg  12.5 mg Oral Q6H PRN Janis Faulkner MD        Or    ondansetron [facility]) injection 4 mg  4 mg IntraVENous Q6H PRN Janis Faulkner MD        glucose chewable tablet 16 g  4 Tablet Oral PRN Kana Baker MD        dextrose (D50W) injection syrg 12.5-25 g  25-50 mL IntraVENous PRN Karlee Fisher MD   25 g at 09/04/21 0409    glucagon (GLUCAGEN) injection 1 mg  1 mg IntraMUSCular PRN Jesus Alberto Melissa MD            Review of Systems   Unable to perform ROS: Mental status change   All other systems reviewed and are negative. Objective     Vital signs for last 24 hours:  Visit Vitals  BP (!) (P) 160/59 (BP 1 Location: Right upper arm, BP Patient Position: At rest)   Pulse (!) (P) 51   Temp 97.3 °F (36.3 °C)   Resp (P) 18   Ht 5' 6\" (1.676 m)   Wt 80 kg (176 lb 5.9 oz)   SpO2 99%   BMI 28.47 kg/m²       Intake/Output this shift:  Current Shift: No intake/output data recorded.   Last 3 Shifts: 09/09 1901 - 09/11 0700  In: -   Out: 5300 [Urine:5300]    Data Review:   Recent Results (from the past 24 hour(s))   GLUCOSE, POC    Collection Time: 09/10/21  5:42 PM   Result Value Ref Range    Glucose (POC) 124 (H) 65 - 117 mg/dL    Performed by [address] [place] [address], POC    Collection Time: 09/10/21 11:04 PM   Result Value Ref Range    Glucose (POC) 131 (H) 65 - 117 mg/dL    Performed by [facility]    RENAL FUNCTION PANEL    Collection Time: 09/11/21  5:55 AM   Result Value Ref Range    Sodium 146 (H) 136 - 145 mmol/L    Potassium 3.6 3.5 - 5.1 mmol/L    Chloride 114 (H) 97 - 108 mmol/L    CO2 24 21 - 32 mmol/L    Anion gap 8 5 - 15 mmol/L    Glucose 150 (H) 65 - 100 mg/dL    BUN 18 6 - 20 mg/dL    Creatinine 0.74 0.55 - 1.02 mg/dL    BUN/Creatinine ratio 24 (H) 12 - 20      GFR est AA >60 >60 ml/min/1.73m2    GFR est non-AA >60 >60 ml/min/1.73m2    Calcium 8.8 8.5 - 10.1 mg/dL    Phosphorus 3.4 2.6 - 4.7 mg/dL    Albumin 2.6 (L) 3.5 - 5.0 g/dL   CBC WITH AUTOMATED DIFF    Collection Time: 09/11/21  5:55 AM   Result Value Ref Range    WBC 13.3 (H) 3.6 - 11.0 K/uL    RBC 4.62 3.80 - 5.20 M/uL    HGB 13.5 11.5 - 16.0 g/dL    HCT 41.5 35.0 - 47.0 %    MCV 89.8 80.0 - 99.0 FL    MCH 29.2 26.0 - 34.0 PG    MCHC 32.5 30.0 - 36.5 g/dL    RDW 14.7 (H) 11.5 - 14.5 %    PLATELET 415 360 - 260 K/uL    MPV 10.6 8.9 - 12.9 FL    NRBC 0.0 0.0  WBC    ABSOLUTE NRBC 0.00 0.00 - 0.01 K/uL    NEUTROPHILS 81 (H) 32 - 75 %    LYMPHOCYTES 12 12 - 49 %    MONOCYTES 6 5 - 13 %    EOSINOPHILS 0 0 - 7 %    BASOPHILS 0 0 - 1 %    IMMATURE GRANULOCYTES 1 (H) 0 - 0.5 %    ABS. NEUTROPHILS 10.9 (H) 1.8 - 8.0 K/UL    ABS. LYMPHOCYTES 1.5 0.8 - 3.5 K/UL    ABS. MONOCYTES 0.7 0.0 - 1.0 K/UL    ABS. EOSINOPHILS 0.0 0.0 - 0.4 K/UL    ABS. BASOPHILS 0.0 0.0 - 0.1 K/UL    ABS. IMM. GRANS. 0.1 (H) 0.00 - 0.04 K/UL    DF AUTOMATED     GLUCOSE, POC    Collection Time: 09/11/21  7:23 AM   Result Value Ref Range    Glucose (POC) 135 (H) 65 - 117 mg/dL    Performed by Brenna Estrada    GLUCOSE, POC    Collection Time: 09/11/21 12:28 PM   Result Value Ref Range    Glucose (POC) 140 (H) 65 - 117 mg/dL    Performed by Brenna Estrada        Physical Exam    Neuro Physical Exam      General: Well developed, well nourished. Patient in no apparent distress. Neurological Exam:  Mental Status: Eyes open, oriented x3, Knows daughters name. Mental status better this am.    Cranial Nerves:   Intact visual fields. PERRL, EOMI, no nystagmus, no ptosis. Facial sensation is normal. Mild left facial droop. Palate is midline. Neck turned to left  Tongue is midline. Hearing is intact bilaterally. Motor:  Moves all 4 extremities spontaneously    Reflexes:   Deep tendon reflexes 2+/4 and symmetrical.   Sensory:   Normal to light touch in all 4 ext    Gait:  Cannot assess   Tremor:   No tremor noted. Cerebellar:  No cerebellar signs present. Babinski:       Down b/l     Assessment and Plan: Ms. 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 - cont acyclovir 15mg/kg q8h. Follow up ID recs. Correction made to reported hsv pcr result. It was infact from CSF. - Will keep keppra at 2g bid IV and cont valrpoic acid 250 mg q8h iv  - will check valproic acid levels  - cont iv decadron 4 mg q6h given worsening right temporal lobe edema  - repeat Ct head today.  If stable I will taper down decadron tomorrow  - EEG : no seizures, generalized slowing  - basilic vein thrombus seen:  on eliquis 5 mg bid     Discussed plan with daughter Yesica Kelly at bedside. It was also discussed that HSV encephalitis even with treatment can result in death and those who survive usually have neurological sequelae with behavioral and cognitive issues.

## 2022-07-24 NOTE — OR SURGEON
Immediate Post- Operative Note        Findings: RIGHT SUBCLAVIAN PORT VENOUS ACCESS DEVICE IN GOOD POSITION. ASPIRATES BLOOD. TIP IN SVC      Procedure(s): R SCV PORT CHECK VENOGRAM    NML STUDY    PAYAN NEEDLE ACCESS LEFT IN PLACE      Estimated Blood Loss: Less than 5 ml        Complications: None            7/24/2022     12:59 PM     Trey Diamond M.D.

## 2022-07-24 NOTE — PROGRESS NOTES
[facility] Internal Med DAILY PROGRESS NOTE    Date of Service: 7/23/2022    Primary Team: R IM Green Team   Attending: Quinton Taveras M.D.   Senior Resident: Dr. Brandt  Intern: Glenys Matson M.D.  Contact:  [phone]    Patient ID:  Name: Zena Myers  YOB: 1955  Code Status: Full Code    Chief Complaint(s):  Sent by MD (Sent by oncologist Dr. Treviño for further workup and admit. Has bone marrow biposy scheduled tomorrow. Hx leukemia. On oral chemo, starting infusions monday), Fever (X1 week. Seen in [facility] yesterday, unable to find source), Nausea, and Malaise (Decreased PO intake the last few days)    Hospital Course:   66-year-old female with a past medical history of acute AML leukemia and chronic migraines presented to emergency department on 7/21/2022 with a 3 day history of on and off subjective fevers and spiking a 101.7 fever yesterday.  Patient associates symptoms with lower abdominal pain which is triggered and worsened 1 hour after meals as well as 3 to 4-day constipation.  She still passing gas.        At the emergency department stable vitals and patient saturating well on room air.  CBC with leukopenia, ANC at 0.01, normocytic anemia with hemoglobin at 3.4 and thrombocytopenia at 70.  Chemistry with mild hyponatremia and anion gap metabolic acidosis.  Alkaline phosphatase 107.  Viral panel negative.  Lactic acid, troponin and lipase low.  Urinalysis without signs of infection.  CRP 14.8.  Chest x-ray with no acute cardiopulmonary abnormalities.  Abdominal/pelvic CT with hepatomegaly and colonic diverticulosis with increase amount of stool in the colon.  Right upper quadrant ultrasound with hepatic steatosis otherwise normal.  Blood samples were collected for culture and patient started on IV cefepime.  Patient was admitted for neutropenic fever requiring IV cefepime and further work-up with head CT along with bone marrow biopsy.     Interval Update (7/23/2022): Pt was  seen today at bedside in no acute distress. She has no signs of infection on exam and has not had a documented fever in >24 hours. Pt still severly neutropenic. Reports multiple BM since bowel protocol initiated. FOBT+, patient denies visible blood in stool. Pt will receive at least one dose of chemotherapy during admission to further monitor symptomology and labs. Heme/onc is following.       Consultants/Specialty:  oncology       A representative from my team or myself have discussed this patient's plan of care and discharge plan at IDT rounds today with Case Management, Nursing, Nursing leadership, and other members of the IDT team.    Disposition:  Patient is not medically cleared for discharge.   Anticipate discharge to to home with close outpatient follow-up.  I have placed the appropriate orders for post-discharge needs.    Review of Systems:    Constitutional: Denies fevers, chills, weight changes, appetite changes, +fatigue  Eyes: Denies changes in vision, scleral icterus  ENT: Denies nasal congestion, sore throat  CV/Respiratory: Denies CP, palpitations, denies SOB, +cough  Gastroenterology: Denies abdominal pain, bloating, dysphagia, nausea, vomiting, diarrhea, constipation, blood in stool  Genitourinary: Denies dysuria or frequency  MSK/Rheum: Denies joint pain, denies joint swelling  Skin: Denies rash, jaundice, ecchymosis  Neuro/Psych: Denies headache, changes in memory, or focal weakness, denies mood disorders, denies SI/HI  Endo/Heme/Onc: Denies thyroid problems, denies enlarged lymph nodes +bruises/bleeds easily      PHYSICAL EXAM:  Vitals:    07/23/22 0348 07/23/22 0703 07/23/22 1152 07/23/22 1503   BP: 128/62 121/60 120/50 129/56   Pulse: 65 76 68 73   Resp: 18 19 20 20   Temp: 36.7 °C (98 °F) 36.3 °C (97.3 °F) 36.3 °C (97.3 °F) 36.3 °C (97.4 °F)   TempSrc: Oral Temporal Temporal Temporal   SpO2: 97% 97% 99% 98%   Weight:       Height:        Body mass index is 39.25 kg/m².  Oxygen Therapy: Pulse  Oximetry: 98 %, O2 (LPM): 0, O2 Delivery Device: None - Room Air    Intake/Output Summary (Last 24 hours) at 7/23/2022 1714  Last data filed at 7/23/2022 1322  Gross per 24 hour   Intake 480 ml   Output --   Net 480 ml     Constitutional: Well developed, well nourished, no acute distress, +obese  Head: Normocephalic  EENT: Conjunctiva not pale, Sclera anicteric, moist mucous membranes, Nose normal. No thyromegaly  Neck: Normal range of motion, No cervical tenderness, +R submandibular tenderness  Chest/Lungs: Symmetric expansion, BS clear to auscultation BL, no crackles, no wheezing.   Cardiovascular: Normal heart rate, Normal rhythm, No extra heart sounds  Abdomen: Bowel sounds normoactive, Soft, non-tender, no guarding  Skin/Extremities: Warm and dry, no cyanosis, edema, or erythema, no rashes or induration, +bruising  Neurologic: Alert and oriented  Psychiatric: Mood and behavior normal    Laboratory Review:  Recent Labs     07/22/22 0718 07/22/22  1559 07/23/22  0451   WBC 0.6* 0.7* 0.7*   RBC 2.47* 2.46* 2.43*   HEMOGLOBIN 7.6* 7.5* 7.4*   HEMATOCRIT 21.7* 21.8* 21.6*   MCV 87.9 88.6 88.9   MCH 30.8 30.5 30.5   MCHC 35.0 34.4 34.3   RDW 42.8 44.7 45.1   PLATELETCT 53* 61* 69*   MPV 10.3 10.6 9.6     Recent Labs     07/21/22 1732 07/22/22  0718 07/23/22  0451   SODIUM 132* 135 136   POTASSIUM 3.8 3.9 4.1   CHLORIDE 101 105 107   CO2 19* 19* 20   BUN 17 13 13   CREATININE 1.08 0.84 0.86   MAGNESIUM 2.1  --  2.0   PHOSPHORUS  --   --  3.1   CALCIUM 9.4 9.2 9.3     Recent Labs     07/21/22 1732 07/22/22  0718 07/23/22  0451   ALTSGPT 27 22 30   ASTSGOT 28 17 30   ALKPHOSPHAT 107* 94 99   TBILIRUBIN 0.5 0.7 0.4   LIPASE 14  --   --    GLUCOSE 188* 117* 153*     Recent Labs     07/22/22  0718   INR 1.12           Imaging/Procedures Review:    CT-HEAD W/O   Final Result         1.  No acute intracranial abnormality.         CT-ABDOMEN-PELVIS W/O   Final Result      1.  No acute abnormality within the abdomen or  pelvis      2.  Hepatomegaly      3.  Colonic diverticulosis and there is increase in expected amount of stool within the colon      US-RUQ   Final Result      1.  Echogenic liver consistent with hepatic steatosis      2.  Negative for gallstones or biliary dilation      DX-CHEST-PORTABLE (1 VIEW)   Final Result      No acute cardiopulmonary abnormality identified.          ASSESSMENT & PLAN via Problem Representation:  * Neutropenic fever (HCC)- (present on admission)  Assessment & Plan  Patient presents with fever of 101.7 degrees. No prior micro results available.  Patient denies any prior neutropenic fever/infection history. Unclear source, as patient is chest x-ray normal, and no dysuria, no signs of skin infection. Imaging with diverticulosis not diverticulitis.  Large stool burden. Last fever documented 7/22/22 @0300. Multiple BM since admission.  - Continue bowel protocol  - Currently no signs of meningitis clinically, monitor closely if needed consider LP  - IV cefipeme ppx  - daily CBC with differential  - will trial chemotherapy administration and reassess    Pancytopenia (HCC)- (present on admission)  Assessment & Plan  Patient presents with pancytopenia, likely secondary to chemo  Anemia with hemoglobin 3.4  Thrombocytopenia with platelets 70  Leukopenia with white blood cell count 0.8  S/p 1 unit pRBCs  Plan:  Goal Hgb >7.0, Plts >20  Serial H/H q12hrs  Neutropenic precautions    AML (acute myelogenous leukemia) (HCC)- (present on admission)  Assessment & Plan  - Venetoclax and decitabine  - Prophylactic fungal and antiviral meds  - Patient follows with Dr. Treviño [facility] (outpatient)    - Oncology (Dr. Krueger) following inpatient: rec's as follows:       -continue on cefepime, neutropenic precautions       -pending final culture results and bone marrow biopsy       -Get cultures obtained in the outpatient hospital perform on Thursday       -Continue holding on venetoclax        -Follow-up with Dr. Treviño in the outpatient setting to continue on therapy and discuss bone marrow biopsy results    Hypertension- (present on admission)  Assessment & Plan  Continue home meds    Hypothyroid- (present on admission)  Assessment & Plan  Continue home meds    Type 2 diabetes mellitus with hyperglycemia, with long-term current use of insulin (HCC)- (present on admission)  Assessment & Plan  On lantus to 100 mg BID with carb counting  Held lantus due to NPO status  BS currently stable  Monitor closely  ISS/hypoglycemia protocol      VTE prophylaxis: pharmacologic prophylaxis contraindicated due to pancytopenia  Tong/Tubes/Lines/Drains: single lumen post R chest, PIV R forearm  Nutrition/Diet Orders:   Orders Placed This Encounter   Procedures   • Diet Order Diet: Consistent CHO (Diabetic)     Standing Status:   Standing     Number of Occurrences:   1     Order Specific Question:   Diet:     Answer:   Consistent CHO (Diabetic) [4]       Thank you very much for allowing me to participate in this patient's care. I have performed a physical exam and reviewed and updated ROS and Plan today (7/23/2022). In review of yesterday's note (7/22/2022), there are no changes except as documented above. All plans of care were discussed with the attending physician. Please contact me with any questions.    Glenys Matson MD, PGY1

## 2022-07-24 NOTE — CARE PLAN
The patient is Watcher - Medium risk of patient condition declining or worsening    Shift Goals  Clinical Goals: Q4 vitals, infection prevention  Patient Goals: remain afebrile, rest  Family Goals: Not present    Progress made toward(s) clinical / shift goals:      Problem: Neutropenia Precautions  Goal: Neutropenic precautions will be implemented and maintained for patient protection  Outcome: Progressing     Problem: Communication  Goal: The ability to communicate needs accurately and effectively will improve  Outcome: Progressing     Problem: Mobility  Goal: Patient's capacity to carry out activities will improve  Outcome: Progressing     Problem: Infection - Standard  Goal: Patient will remain free from infection  Outcome: Progressing     Patient is Aox4, up standby. Pt gets short of breath when ambulating and began to feel unsteady. Pt educated that if they refuse the bed alarm, they must call for assistance. Pt verbalized understanding and agreed. Pt reported feeling like their heart was racing whenever they would try to sleep but after taking their vitals again and seeing they were unchanged, the pt was able to relax and eventually fall asleep.

## 2022-07-24 NOTE — PROGRESS NOTES
Oncology/Hematology Progress Note               Author: Freedom Krueger III, M.D. Date & Time created: 7/24/2022  8:48 AM   AML on Dacogen and venetoclax admitted with febrile neutropenia  Interval History:  Patient reports doing exceptionally well.  No fevers, cultures remains negative, no laboratory work-up done today, vital signs within normal limits.  She denies aches or pains, there is no shortness of breath, chest pain, she does have chronic cough and it is unchanged.  Bone marrow biopsy results pending    PMHx, PSurgHx, SocHx, FAMHx: Reviewed and unchanged    Review of Systems:  Review of Systems   Constitutional: Positive for malaise/fatigue. Negative for chills, fever and weight loss.   HENT: Negative.    Eyes: Negative.    Respiratory: Negative for cough, hemoptysis, sputum production, shortness of breath and wheezing.    Cardiovascular: Negative for chest pain, palpitations, orthopnea and claudication.   Gastrointestinal: Negative for abdominal pain, constipation, diarrhea, heartburn, nausea and vomiting.   Genitourinary: Negative for dysuria.   Musculoskeletal: Negative.    Skin: Negative.    Neurological: Negative.    Psychiatric/Behavioral: Negative.        Physical Exam:  Physical Exam  Constitutional:       Appearance: Normal appearance.   HENT:      Head: Normocephalic and atraumatic.      Nose: Nose normal.      Mouth/Throat:      Mouth: Mucous membranes are moist.   Eyes:      Extraocular Movements: Extraocular movements intact.      Pupils: Pupils are equal, round, and reactive to light.   Cardiovascular:      Rate and Rhythm: Normal rate and regular rhythm.   Pulmonary:      Effort: Pulmonary effort is normal.      Breath sounds: Normal breath sounds.   Abdominal:      General: Abdomen is flat.   Musculoskeletal:         General: No swelling.   Skin:     Coloration: Skin is not jaundiced.   Neurological:      Mental Status: She is alert and oriented to person, place, and time.         Labs:           Recent Labs     22   SODIUM 132* 135 136   POTASSIUM 3.8 3.9 4.1   CHLORIDE 101 105 107   CO2 19* 19* 20   BUN 17 13 13   CREATININE 1.08 0.84 0.86   MAGNESIUM 2.1  --  2.0   PHOSPHORUS  --   --  3.1   CALCIUM 9.4 9.2 9.3     Recent Labs     22   ALTSGPT  30   ASTSGOT  30   ALKPHOSPHAT 107* 94 99   TBILIRUBIN 0.5 0.7 0.4   LIPASE 14  --   --    PREALBUMIN  --   --  10.2*   GLUCOSE 188* 117* 153*     Recent Labs     22  15522   RBC 2.47* 2.46* 2.43*   HEMOGLOBIN 7.6* 7.5* 7.4*   HEMATOCRIT 21.7* 21.8* 21.6*   PLATELETCT 53* 61* 69*   PROTHROMBTM 14.3  --   --    INR 1.12  --   --    IRON  --   --  110   FERRITIN  --   --  >2000.0*   TOTIRONBC  --   --  245*     Recent Labs     22   WBC 0.8* 0.6* 0.7* 0.7*   NEUTSPOLYS 1.00* 0.00*  --  0.90*   LYMPHOCYTES 95.00* 100.00*  --  99.10*   MONOCYTES 4.00 0.00  --  0.00   EOSINOPHILS 0.00 0.00  --  0.00   BASOPHILS 0.00 0.00  --  0.00   ASTSGOT   --  30   ALTSGPT   --  30   ALKPHOSPHAT 107* 94  --  99   TBILIRUBIN 0.5 0.7  --  0.4     Recent Labs     22   SODIUM 132* 135 136   POTASSIUM 3.8 3.9 4.1   CHLORIDE 101 105 107   CO2 19* 19* 20   GLUCOSE 188* 117* 153*   BUN 17 13 13   CREATININE 1.08 0.84 0.86   CALCIUM 9.4 9.2 9.3     Hemodynamics:  Temp (24hrs), Av.8 °C (98.2 °F), Min:36.3 °C (97.3 °F), Max:37.3 °C (99.2 °F)  Temperature: 36.7 °C (98 °F)  Pulse  Av.8  Min: 61  Max: 124   Blood Pressure : 139/64     Respiratory:    Respiration: 18, Pulse Oximetry: 96 %           Fluids:    Intake/Output Summary (Last 24 hours) at 2022 1059  Last data filed at 2022 1802  Gross per 24 hour   Intake 480 ml   Output --   Net 480 ml        GI/Nutrition:  Orders Placed This Encounter   Procedures   • Diet Order Diet: Consistent  CHO (Diabetic)     Standing Status:   Standing     Number of Occurrences:   1     Order Specific Question:   Diet:     Answer:   Consistent CHO (Diabetic) [4]     Medical Decision Making, by Problem:  Active Hospital Problems    Diagnosis    • *Neutropenic fever (HCC) [D70.9, R50.81]    • Pancytopenia (HCC) [D61.818]    • Hypertension [I10]    • Hypothyroid [E03.9]    • Type 2 diabetes mellitus with hyperglycemia, with long-term current use of insulin (HCC) [E11.65, Z79.4]    • AML (acute myelogenous leukemia) (HCC) [C92.00]        Plan:  1.  Febrile neutropenia.  -Started on broad-spectrum antibiotics with cefepime, panculture, chest x-ray negative.        2.  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  FISH testing negative for FLT3, IDH 1-2, T p53.  Positive for CEBPA.   -5/25/22 started  low intensity induction therapy with Dacogen and venetoclax,   -7/22 bone marrow biopsy performed, awaiting results     3.  Antibiotic prophylaxis with voriconazole and acyclovir  She agreed and verbalized her agreement and understanding with the current plan.  I answered all questions and concerns she has at this time.              Thank you for allowing me to participate in her care.     4.  Pancytopenia  due to AML and therapy related     5.  Severe anemia, transfuse if hemoglobin less than 7 g/dL or bleeding, CMV irradiated blood products     6.  Thrombocytopenia    Plan  -Okay to discharge patient if we can obtain cultures from [facility] and if negative.  Otherwise advised to discharge her tomorrow  if cultures here remain negative.  -The patient is due to start cycle #3 of Dacogen and venetoclax tomorrow, advised the patient to hold on that, she will follow-up in the outpatient setting with us this week and depending on bone marrow biopsy results will decide on dose and schedule of  Cycle #3  Of Dacogen and venetoclax.  -She does have an upcoming appointment with us  on 7/28 at 1:30 PM   -Continue outpatient blood checks twice a week  and transfuse if needed, she does have standing orders already.    We will sign off, please call back if questions or concerns    High complexity, complex decision making        Quality-Core Measures

## 2022-07-25 PROCEDURE — 700117 HCHG RX CONTRAST REV CODE 255

## 2022-07-25 RX ADMIN — IOHEXOL 8 ML: 350 INJECTION, SOLUTION INTRAVENOUS at 10:00

## 2022-07-25 NOTE — DISCHARGE INSTRUCTIONS
Discharge Instructions    Discharged to home by car with relative. Discharged via wheelchair, hospital escort: Yes.  Special equipment needed: Not Applicable    Be sure to schedule a follow-up appointment with your primary care doctor or any specialists as instructed.     Discharge Plan:        I understand that a diet low in cholesterol, fat, and sodium is recommended for good health. Unless I have been given specific instructions below for another diet, I accept this instruction as my diet prescription.   Other diet: regular    Special Instructions: None    -Is this patient being discharged with medication to prevent blood clots?  No    Is patient discharged on Warfarin / Coumadin?   No

## 2022-07-25 NOTE — PROGRESS NOTES
I called patient today and spoke with her to follow-up on her lantus dosing at home.     Per patient, she takes lantus depending on her carb intake of that day. If she takes in 50 carbs or less, she takes 80u of long-acting lantus and if she is higher than 50 carbs, she takes 120u of her long acting lantus. She reports that her pcp is aware of this self-dosing and is tolerable of it as it has been stabilizing and improving her a1c readings.

## 2022-07-25 NOTE — PROGRESS NOTES
Discharge order received. Port Deaccessed.  Printed discharge instructionsgiven to pt. All discharge education complete, specifically need to f/u with PCP and come back through the ED for new or worsening symptoms, all questions and concerns were addressed. VSS. Pt  transported to private vehicle at discharge

## 2022-07-26 LAB
BACTERIA BLD CULT: NORMAL
BACTERIA BLD CULT: NORMAL
SIGNIFICANT IND 70042: NORMAL
SIGNIFICANT IND 70042: NORMAL
SITE SITE: NORMAL
SITE SITE: NORMAL
SOURCE SOURCE: NORMAL
SOURCE SOURCE: NORMAL

## (undated) DEVICE — BAG SPONGE COUNT 10.25 X 32 - BLUE (250/CA)

## (undated) DEVICE — SENSOR OXIMETER ADULT SPO2 RD SET (20EA/BX)

## (undated) DEVICE — CUSHION EAR E-Z WRAP NASAL CANNULA - (25/CA)

## (undated) DEVICE — SOD. CHL 10CC SYRINGE PREFILL - W/10 CC (30/BX)

## (undated) DEVICE — SET LEADWIRE 5 LEAD BEDSIDE DISPOSABLE ECG (1SET OF 5/EA)

## (undated) DEVICE — TOWEL STOP TIMEOUT SAFETY FLAG (40EA/CA)

## (undated) DEVICE — KIT  I.V. START (100EA/CA)

## (undated) DEVICE — BANDAID SHEER STRIP 3/4 IN (100EA/BX 12BX/CA)

## (undated) DEVICE — ELECTRODE 850 FOAM ADHESIVE - HYDROGEL RADIOTRNSPRNT (50/PK)

## (undated) DEVICE — CANISTER SUCTION 3000ML MECHANICAL FILTER AUTO SHUTOFF MEDI-VAC NONSTERILE LF DISP  (40EA/CA)

## (undated) DEVICE — SYRINGE NON SAFETY 5 CC 20 GA X 1-1/2 IN (100/BX 4BX/CA)

## (undated) DEVICE — SYRINGE 3 CC 22 GA X 1-1/2 - NDL SAFETY (50/BX 8BX/CA)

## (undated) DEVICE — SET EXTENSION WITH 2 PORTS (48EA/CA) ***PART #2C8610 IS A SUBSTITUTE*****